# Patient Record
Sex: MALE | Race: BLACK OR AFRICAN AMERICAN | NOT HISPANIC OR LATINO | Employment: OTHER | URBAN - METROPOLITAN AREA
[De-identification: names, ages, dates, MRNs, and addresses within clinical notes are randomized per-mention and may not be internally consistent; named-entity substitution may affect disease eponyms.]

---

## 2017-01-06 ENCOUNTER — TELEPHONE (OUTPATIENT)
Dept: RADIOLOGY | Facility: HOSPITAL | Age: 62
End: 2017-01-06

## 2019-07-22 PROBLEM — E87.5 HYPERKALEMIA: Status: ACTIVE | Noted: 2019-07-22

## 2019-07-25 ENCOUNTER — PATIENT OUTREACH (OUTPATIENT)
Dept: ADMINISTRATIVE | Facility: CLINIC | Age: 64
End: 2019-07-25

## 2019-07-25 NOTE — PROGRESS NOTES
C3 nurse attempted to contact patient. No answer. The following message was left for the patient to return the call:  Good morning I am a nurse calling on behalf of [facility] from the [facility].  This is a Transitional Care Call for Kevin. When you have a moment please contact us at [phone] or [phone] Monday through Friday, between the hours of 8 am to 4 pm. We look forward to speaking with you. On behalf of [facility] have a nice day.    The patient does not have a scheduled HOSFU appointment within 7-14 days post hospital discharge date 07/24/19. Non [facility] P[facility].

## 2019-07-26 NOTE — PATIENT INSTRUCTIONS
"  Discharge Instructions for Hyperkalemia  You have been diagnosed with hyperkalemia (a high level of potassium in the blood). Potassium is important to the function of the nerve and muscle cells, including the cells of the heart. But a high level of potassium in the blood can cause  serious problems such as abnormal heart rhythms and even heart attack.  Diet changes  · Eat less of these potassium-rich foods:  ¨ Bananas (avoid bananas completely)  ¨ Apricots, fresh or dried  ¨ Oranges and orange juice  ¨ Grapefruit juice  ¨ Tomatoes, tomato sauce, and tomato juice  ¨ Spinach  ¨ Green, leafy vegetables, including salad greens, kale, broccoli, chard, and collards  ¨ Melons (all kinds)  ¨ Peas  ¨ Beans  ¨ Potatoes  ¨ Sweet potatoes  ¨ Avocados and guacamole  ¨ Vegetable juice (homemade or store-bought) and vegetable juice cocktail  ¨ Fruit juices  ¨ Nuts, including pistachios, almonds, peanuts, hazelnuts, [place], cashew, mixed  ¨ "Lite" or reduced sodium salt  Other home care  · Tell your healthcare provider about all prescription and over-the-counter medicines you are taking. Certain medicines can increase potassium levels.  · Take all medicines exactly as directed.  · Have your potassium levels checked regularly.  · Keep all follow-up appointments. Your healthcare provider needs to monitor your condition closely.  · Learn to take your own pulse. If your pulse is less than 60 beats per minute or irregular, call your provider.  Follow-up  Make a follow-up appointment as directed by our staff.     When to Call Your healthcare provider  Call your provider right away if you have any of the following:  · Chest pain (call 911)  · Fainting (call 911)  · Shortness of breath (call 911 if severe)  · Slow, irregular heartbeat  · Fatigue  · Dizziness  · Lightheadedness  · Confusion   Date Last Reviewed: 6/19/2015  © 2783-6836 [employer]. [address] [place] [address], [place] [address]. All rights reserved. This " information is not intended as a substitute for professional medical care. Always follow your healthcare professional's instructions.

## 2019-12-19 ENCOUNTER — TELEPHONE (OUTPATIENT)
Dept: TRANSPLANT | Facility: CLINIC | Age: 64
End: 2019-12-19

## 2020-01-20 PROBLEM — N18.5 CKD (CHRONIC KIDNEY DISEASE), STAGE V: Status: ACTIVE | Noted: 2020-01-20

## 2020-01-21 PROBLEM — D64.9 ANEMIA: Status: ACTIVE | Noted: 2020-01-21

## 2020-02-10 PROBLEM — N18.9 CHRONIC KIDNEY DISEASE (CKD): Status: ACTIVE | Noted: 2020-02-10

## 2020-03-02 PROBLEM — E55.9 VITAMIN D DEFICIENCY DISEASE: Status: ACTIVE | Noted: 2020-03-02

## 2020-03-02 PROBLEM — D63.1 ANEMIA OF CHRONIC RENAL FAILURE, STAGE 5: Status: ACTIVE | Noted: 2020-03-02

## 2020-03-02 PROBLEM — N18.5 ANEMIA OF CHRONIC RENAL FAILURE, STAGE 5: Status: ACTIVE | Noted: 2020-03-02

## 2020-06-12 ENCOUNTER — HISTORICAL (OUTPATIENT)
Dept: ADMINISTRATIVE | Facility: HOSPITAL | Age: 65
End: 2020-06-12

## 2020-06-12 LAB
ALBUMIN SERPL BCP-MCNC: 3.7 G/DL (ref 3.5–5)
ALBUMIN/GLOB SERPL ELPH: 1 {RATIO} (ref 1.5–2.2)
ALP SERPL-CCNC: 125 U/L (ref 50–136)
ALT SERPL W P-5'-P-CCNC: 21 U/L (ref 16–61)
ANION GAP SERPL CALC-SCNC: 4.2 MEQ/L (ref 10–20)
AST SERPL-CCNC: 23 U/L (ref 15–37)
BASOPHILS NFR BLD: 0 10 (ref 0–0.1)
BASOPHILS NFR BLD: 0.4 % (ref 0–1.5)
BILIRUB SERPL-MCNC: 0.51 MG/DL (ref 0.2–1)
BUN SERPL-MCNC: 15 MG/DL (ref 7–18)
CALCIUM SERPL-MCNC: 8.6 MG/DL (ref 8.5–10.1)
CHLORIDE SERPL-SCNC: 103 MMOL/L (ref 98–107)
CO2 SERPL-SCNC: 38 MMOL/L (ref 22–32)
CREAT SERPL-MCNC: 5.14 MG/DL (ref 0.7–1.3)
EGFR: 15 ML/MIN/1.73M
EOSINOPHIL NFR BLD: 0.1 10 (ref 0–0.7)
EOSINOPHIL NFR BLD: 1.4 % (ref 0–7)
ERYTHROCYTE [DISTWIDTH] IN BLOOD BY AUTOMATED COUNT: 13.8 % (ref 11.5–14.5)
GLOBULIN: 3.8 G/DL (ref 2.3–3.5)
GLUCOSE SERPL-MCNC: 113 MG/DL (ref 70–99)
GRAN #: 2.89 10 (ref 2–7.5)
GRAN%: 0.2 %
GRAN%: 51.9 % (ref 50–80)
HCT VFR BLD AUTO: 41.6 % (ref 43.5–53.7)
HGB BLD-MCNC: 13.8 G/DL (ref 14.1–18.1)
IMMATURE GRANULOCYTES #: 0.01 10
IPF: 8.1
LIPASE SERPL-CCNC: 319 U/L (ref 73–393)
LYMPH #: 1.9 10 (ref 1–3.5)
LYMPH%: 33.5 % (ref 12–50)
MCH RBC QN AUTO: 31.4 PG (ref 27–31)
MCHC RBC AUTO-ENTMCNC: 33.2 G% (ref 32–35)
MCV RBC AUTO: 94.5 FL (ref 80–97)
MONO #: 0.7 10 (ref 0–0.8)
MONO%: 12.6 % (ref 0–12)
OSMOC: 283 MOSM/KG (ref 275–295)
PMV BLD AUTO: 11.9 FL (ref 7.4–10.4)
PMV BLD AUTO: 87 10 (ref 142–424)
POTASSIUM SERPL-SCNC: 4.2 MMOL/L (ref 3.5–5.1)
PROT SERPL-MCNC: 7.5 G/DL (ref 6.4–8.2)
RBC # BLD AUTO: 4.4 M/UL (ref 4.69–6.13)
SODIUM BLD-SCNC: 141 MMOL/L (ref 136–145)
TROPONIN I SERPL DL<=0.01 NG/ML-MCNC: 0.19 NG/ML (ref 0–0.05)
WBC # BLD AUTO: 5.6 10 (ref 4–10.2)

## 2020-12-14 ENCOUNTER — HOSPITAL ENCOUNTER (EMERGENCY)
Facility: HOSPITAL | Age: 65
Discharge: HOME | End: 2020-12-14
Attending: EMERGENCY MEDICINE
Payer: MEDICARE

## 2020-12-14 VITALS
WEIGHT: 198 LBS | SYSTOLIC BLOOD PRESSURE: 138 MMHG | BODY MASS INDEX: 29.33 KG/M2 | OXYGEN SATURATION: 98 % | HEIGHT: 69 IN | TEMPERATURE: 98 F | RESPIRATION RATE: 18 BRPM | DIASTOLIC BLOOD PRESSURE: 63 MMHG | HEART RATE: 65 BPM

## 2020-12-14 DIAGNOSIS — R06.02 SHORTNESS OF BREATH: ICD-10-CM

## 2020-12-14 DIAGNOSIS — G44.89 OTHER HEADACHE SYNDROME: Primary | ICD-10-CM

## 2020-12-14 DIAGNOSIS — N18.6 ESRD (END STAGE RENAL DISEASE): ICD-10-CM

## 2020-12-14 LAB
ALBUMIN SERPL BCP-MCNC: 3.7 G/DL (ref 3.5–5.2)
ALP SERPL-CCNC: 106 U/L (ref 55–135)
ALT SERPL W/O P-5'-P-CCNC: 23 U/L (ref 10–44)
ANION GAP SERPL CALC-SCNC: 1 MMOL/L (ref 8–16)
AST SERPL-CCNC: 15 U/L (ref 10–40)
BASOPHILS # BLD AUTO: 0.02 K/UL (ref 0–0.2)
BASOPHILS NFR BLD: 0.3 % (ref 0–1.9)
BILIRUB SERPL-MCNC: 0.4 MG/DL (ref 0.1–1)
BUN SERPL-MCNC: 16 MG/DL (ref 8–23)
CALCIUM SERPL-MCNC: 8.8 MG/DL (ref 8.7–10.5)
CHLORIDE SERPL-SCNC: 101 MMOL/L (ref 95–110)
CO2 SERPL-SCNC: 35 MMOL/L (ref 23–29)
CREAT SERPL-MCNC: 5.8 MG/DL (ref 0.5–1.4)
CTP QC/QA: YES
DIFFERENTIAL METHOD: ABNORMAL
EOSINOPHIL # BLD AUTO: 0.2 K/UL (ref 0–0.5)
EOSINOPHIL NFR BLD: 2.6 % (ref 0–8)
ERYTHROCYTE [DISTWIDTH] IN BLOOD BY AUTOMATED COUNT: 13.6 % (ref 11.5–14.5)
EST. GFR  (AFRICAN AMERICAN): 11 ML/MIN/1.73 M^2
EST. GFR  (NON AFRICAN AMERICAN): 9.5 ML/MIN/1.73 M^2
GLUCOSE SERPL-MCNC: 120 MG/DL (ref 70–110)
HCT VFR BLD AUTO: 39.3 % (ref 40–54)
HGB BLD-MCNC: 12.7 G/DL (ref 14–18)
IMM GRANULOCYTES # BLD AUTO: 0.02 K/UL (ref 0–0.04)
IMM GRANULOCYTES NFR BLD AUTO: 0.3 % (ref 0–0.5)
LIPASE SERPL-CCNC: 240 U/L (ref 23–300)
LYMPHOCYTES # BLD AUTO: 1.6 K/UL (ref 1–4.8)
LYMPHOCYTES NFR BLD: 26.3 % (ref 18–48)
MCH RBC QN AUTO: 32.4 PG (ref 27–31)
MCHC RBC AUTO-ENTMCNC: 32.3 G/DL (ref 32–36)
MCV RBC AUTO: 100 FL (ref 82–98)
MONOCYTES # BLD AUTO: 0.7 K/UL (ref 0.3–1)
MONOCYTES NFR BLD: 12 % (ref 4–15)
NEUTROPHILS # BLD AUTO: 3.6 K/UL (ref 1.8–7.7)
NEUTROPHILS NFR BLD: 58.5 % (ref 38–73)
NRBC BLD-RTO: 0 /100 WBC
NT-PROBNP SERPL-MCNC: 306 PG/ML (ref 5–900)
PLATELET # BLD AUTO: 95 K/UL (ref 150–350)
PMV BLD AUTO: 11.1 FL (ref 9.2–12.9)
POTASSIUM SERPL-SCNC: 4.6 MMOL/L (ref 3.5–5.1)
PROT SERPL-MCNC: 7.5 G/DL (ref 6–8.4)
RBC # BLD AUTO: 3.92 M/UL (ref 4.6–6.2)
SARS-COV-2 RDRP RESP QL NAA+PROBE: NEGATIVE
SODIUM SERPL-SCNC: 137 MMOL/L (ref 136–145)
WBC # BLD AUTO: 6.08 K/UL (ref 3.9–12.7)

## 2020-12-14 PROCEDURE — 99284 EMERGENCY DEPT VISIT MOD MDM: CPT | Mod: 25

## 2020-12-14 PROCEDURE — 36415 COLL VENOUS BLD VENIPUNCTURE: CPT

## 2020-12-14 PROCEDURE — 83690 ASSAY OF LIPASE: CPT

## 2020-12-14 PROCEDURE — 25000003 PHARM REV CODE 250: Performed by: CLINICAL NURSE SPECIALIST

## 2020-12-14 PROCEDURE — 80053 COMPREHEN METABOLIC PANEL: CPT

## 2020-12-14 PROCEDURE — 83880 ASSAY OF NATRIURETIC PEPTIDE: CPT

## 2020-12-14 PROCEDURE — 85025 COMPLETE CBC W/AUTO DIFF WBC: CPT

## 2020-12-14 PROCEDURE — U0002 COVID-19 LAB TEST NON-CDC: HCPCS | Performed by: CLINICAL NURSE SPECIALIST

## 2020-12-14 RX ORDER — BUTALBITAL, ACETAMINOPHEN AND CAFFEINE 50; 325; 40 MG/1; MG/1; MG/1
1 TABLET ORAL
Status: COMPLETED | OUTPATIENT
Start: 2020-12-14 | End: 2020-12-14

## 2020-12-14 RX ADMIN — BUTALBITAL, ACETAMINOPHEN, AND CAFFEINE 1 TABLET: 50; 325; 40 TABLET ORAL at 05:12

## 2020-12-14 NOTE — ED PROVIDER NOTES
Encounter Date: 12/14/2020       History     Chief Complaint   Patient presents with    Migraine     It started at dialysis today.  Im just not feeling good.  I have a headache and neck pain.  I have also been having some diarrhea.      Kevin Sanon is an 65 y.o. male who complains of headache, fatigue, neck pain, weakness, shortness of breath. Symptoms began today and dialysis.  Patient did finish is total dialysis.  History of diabetes, anemia, COPD, renal failure.  Patient did not take any medication prior to arrival. Caregiver requesting blood work.          Review of patient's allergies indicates:  No Known Allergies  Past Medical History:   Diagnosis Date    Anemia     Back pain     COPD (chronic obstructive pulmonary disease)     Decreased platelet count     Diabetes mellitus     Hypertension     Kidney disease      Past Surgical History:   Procedure Laterality Date    BACK SURGERY      KNEE SURGERY      PLACEMENT OF ARTERIOVENOUS GRAFT Left 2/10/2020    Procedure: INSERTION, GRAFT, ARTERIOVENOUS;  Surgeon: Artie Joshi MD;  Location: [facility];  Service: Cardiovascular;  Laterality: Left;     Family History   Problem Relation Age of Onset    Diabetes Mother     No Known Problems Father     Kidney disease Sister     Diabetes Brother      Social History     Tobacco Use    Smoking status: Former Smoker     Packs/day: 1.00     Years: 20.00     Pack years: 20.00     Types: Cigars, Cigarettes    Smokeless tobacco: Never Used    Tobacco comment: Also reports 5-6 yrs of smoking cigars   Substance Use Topics    Alcohol use: No    Drug use: No     Review of Systems   Constitutional: Positive for activity change and fatigue. Negative for fever.   HENT: Negative for sore throat.    Respiratory: Positive for shortness of breath.    Cardiovascular: Negative for chest pain.   Gastrointestinal: Positive for diarrhea. Negative for nausea.   Genitourinary: Negative for dysuria.    Musculoskeletal: Negative for back pain.   Skin: Negative for rash.   Neurological: Positive for weakness and headaches.   Hematological: Does not bruise/bleed easily.   All other systems reviewed and are negative.      Physical Exam     Initial Vitals   BP Pulse Resp Temp SpO2   12/14/20 1733 12/14/20 1733 12/14/20 1732 12/14/20 1732 12/14/20 1733   126/67 89 18 97.6 °F (36.4 °C) 100 %      MAP       --                Physical Exam    Nursing note and vitals reviewed.  Constitutional: He appears well-developed and well-nourished.   HENT:   Head: Normocephalic and atraumatic.   Eyes: Pupils are equal, round, and reactive to light.   Neck: Normal range of motion.   Cardiovascular: Normal rate and regular rhythm.   Pulmonary/Chest: Breath sounds normal.   Abdominal: Soft. Bowel sounds are normal.   Musculoskeletal: Normal range of motion.   Neurological: He is alert and oriented to person, place, and time.   Skin: Skin is warm.   Psychiatric: He has a normal mood and affect.         ED Course   Procedures  Labs Reviewed   CBC W/ AUTO DIFFERENTIAL - Abnormal; Notable for the following components:       Result Value    RBC 3.92 (*)     Hemoglobin 12.7 (*)     Hematocrit 39.3 (*)      (*)     MCH 32.4 (*)     Platelets 95 (*)     All other components within normal limits   COMPREHENSIVE METABOLIC PANEL - Abnormal; Notable for the following components:    CO2 35 (*)     Glucose 120 (*)     Creatinine 5.8 (*)     Anion Gap 1 (*)     eGFR if  11.0 (*)     eGFR if non  9.5 (*)     All other components within normal limits   NT-PRO NATRIURETIC PEPTIDE   LIPASE   SARS-COV-2 RDRP GENE    Narrative:     This test utilizes isothermal nucleic acid amplification   technology to detect the SARS-CoV-2 RdRp nucleic acid segment.   The analytical sensitivity (limit of detection) is 125 genome   equivalents/mL.   A POSITIVE result implies infection with the SARS-CoV-2 virus;   the patient is  "presumed to be contagious.     A NEGATIVE result means that SARS-CoV-2 nucleic acids are not   present above the limit of detection. A NEGATIVE result should be   treated as presumptive. It does not rule out the possibility of   COVID-19 and should not be the sole basis for treatment decisions.   If COVID-19 is strongly suspected based on clinical and exposure   history, re-testing using an alternate molecular assay should be   considered.   This test is only for use under the Food and Drug   Administration s Emergency Use Authorization (EUA).   Commercial kits are provided by [employer]   Performance characteristics of the EUA have been independently   verified by [facility] Department of   Pathology and Laboratory Medicine.   _________________________________________________________________   The authorized Fact Sheet for Healthcare Providers and the authorized Fact   Sheet for Patients of the ID NOW COVID-19 are available on the FDA   website:     https://www.fda.gov/media/936503/download  https://www.fda.gov/media/809142/download              Imaging Results          X-Ray Chest AP Portable (In process)                  Medical Decision Making:   Differential Diagnosis:   COVID, CHF, COPD, anemia  Clinical Tests:   Lab Tests: Ordered and Reviewed  Radiological Study: Ordered and Reviewed                   ED Course as of Dec 14 1907   Mon Dec 14, 2020   1814 SARS-CoV-2 RNA, Amplification, Qual: Negative [AB]   1814 RBC(!): 3.92 [AB]   1814 Hemoglobin(!): 12.7 [AB]   1814 Hematocrit(!): 39.3 [AB]   1900 CO2(!): 35 [AB]   1901 Glucose(!): 120 [AB]   1901 Creatinine(!): 5.8 [AB]   1901 NT-proBNP: 306 [AB]   1901 Lipase Result: 240 [AB]   1901 No acute disease     X-Ray Chest AP Portable [AB]   1902 Patient was given lab and x-ray results and states understanding. Patient states "I feel better now."    [AB]      ED Course User Index  [AB] Merle Bassett NP            Clinical Impression:       " ICD-10-CM ICD-9-CM   1. Other headache syndrome  G44.89 339.89   2. Shortness of breath  R06.02 786.05   3. ESRD (end stage renal disease)  N18.6 585.6                          ED Disposition Condition    Discharge Stable        ED Prescriptions     None        Follow-up Information     Follow up With Specialties Details Why Contact Info    Emmy Gaines NP Family Medicine  As needed, If symptoms worsen [address] [place] [address]  [phone]                                         Merle Bassett NP  12/14/20 3408

## 2021-05-19 ENCOUNTER — HOSPITAL ENCOUNTER (EMERGENCY)
Facility: HOSPITAL | Age: 66
Discharge: HOME | End: 2021-05-19
Attending: EMERGENCY MEDICINE
Payer: MEDICARE

## 2021-05-19 VITALS
WEIGHT: 200 LBS | SYSTOLIC BLOOD PRESSURE: 152 MMHG | RESPIRATION RATE: 18 BRPM | TEMPERATURE: 98 F | DIASTOLIC BLOOD PRESSURE: 69 MMHG | HEIGHT: 69 IN | OXYGEN SATURATION: 98 % | BODY MASS INDEX: 29.62 KG/M2 | HEART RATE: 87 BPM

## 2021-05-19 DIAGNOSIS — J06.9 VIRAL URI WITH COUGH: Primary | ICD-10-CM

## 2021-05-19 DIAGNOSIS — R05.9 COUGH: ICD-10-CM

## 2021-05-19 DIAGNOSIS — J44.9 CHRONIC OBSTRUCTIVE PULMONARY DISEASE, UNSPECIFIED COPD TYPE: ICD-10-CM

## 2021-05-19 PROCEDURE — 94640 AIRWAY INHALATION TREATMENT: CPT

## 2021-05-19 PROCEDURE — 63700000 PHARM REV CODE 250 ALT 637 W/O HCPCS: Performed by: EMERGENCY MEDICINE

## 2021-05-19 PROCEDURE — 99900035 HC TECH TIME PER 15 MIN (STAT)

## 2021-05-19 PROCEDURE — 99284 EMERGENCY DEPT VISIT MOD MDM: CPT | Mod: 25

## 2021-05-19 PROCEDURE — 63600175 PHARM REV CODE 636 W HCPCS: Performed by: EMERGENCY MEDICINE

## 2021-05-19 PROCEDURE — 25000242 PHARM REV CODE 250 ALT 637 W/ HCPCS: Performed by: EMERGENCY MEDICINE

## 2021-05-19 PROCEDURE — 25000003 PHARM REV CODE 250: Performed by: EMERGENCY MEDICINE

## 2021-05-19 PROCEDURE — 96372 THER/PROPH/DIAG INJ SC/IM: CPT

## 2021-05-19 RX ORDER — DEXAMETHASONE SODIUM PHOSPHATE 4 MG/ML
8 INJECTION, SOLUTION INTRA-ARTICULAR; INTRALESIONAL; INTRAMUSCULAR; INTRAVENOUS; SOFT TISSUE
Status: COMPLETED | OUTPATIENT
Start: 2021-05-19 | End: 2021-05-19

## 2021-05-19 RX ORDER — PROMETHAZINE HYDROCHLORIDE AND DEXTROMETHORPHAN HYDROBROMIDE 6.25; 15 MG/5ML; MG/5ML
5 SYRUP ORAL 3 TIMES DAILY
Qty: 1 BOTTLE | Refills: 0 | Status: SHIPPED | OUTPATIENT
Start: 2021-05-19 | End: 2021-05-24

## 2021-05-19 RX ORDER — HYDROCODONE BITARTRATE AND ACETAMINOPHEN 7.5; 325 MG/15ML; MG/15ML
15 SOLUTION ORAL
Status: COMPLETED | OUTPATIENT
Start: 2021-05-19 | End: 2021-05-19

## 2021-05-19 RX ORDER — BENZONATATE 100 MG/1
100 CAPSULE ORAL 3 TIMES DAILY PRN
Qty: 20 CAPSULE | Refills: 0 | Status: SHIPPED | OUTPATIENT
Start: 2021-05-19 | End: 2021-05-29

## 2021-05-19 RX ORDER — AZITHROMYCIN 250 MG/1
500 TABLET, FILM COATED ORAL
Status: COMPLETED | OUTPATIENT
Start: 2021-05-19 | End: 2021-05-19

## 2021-05-19 RX ORDER — IPRATROPIUM BROMIDE AND ALBUTEROL SULFATE 2.5; .5 MG/3ML; MG/3ML
3 SOLUTION RESPIRATORY (INHALATION)
Status: COMPLETED | OUTPATIENT
Start: 2021-05-19 | End: 2021-05-19

## 2021-05-19 RX ORDER — ALBUTEROL SULFATE 90 UG/1
2 AEROSOL, METERED RESPIRATORY (INHALATION) EVERY 4 HOURS PRN
Qty: 18 G | Refills: 0 | Status: SHIPPED | OUTPATIENT
Start: 2021-05-19 | End: 2021-11-02

## 2021-05-19 RX ORDER — HYDROCODONE BITARTRATE AND ACETAMINOPHEN 7.5; 325 MG/15ML; MG/15ML
15 SOLUTION ORAL EVERY 4 HOURS PRN
Status: DISCONTINUED | OUTPATIENT
Start: 2021-05-19 | End: 2021-05-19

## 2021-05-19 RX ORDER — AZITHROMYCIN 500 MG/1
500 TABLET, FILM COATED ORAL DAILY
Qty: 7 TABLET | Refills: 0 | Status: SHIPPED | OUTPATIENT
Start: 2021-05-19 | End: 2021-05-26

## 2021-05-19 RX ADMIN — DEXAMETHASONE SODIUM PHOSPHATE 8 MG: 4 INJECTION, SOLUTION INTRA-ARTICULAR; INTRALESIONAL; INTRAMUSCULAR; INTRAVENOUS; SOFT TISSUE at 08:05

## 2021-05-19 RX ADMIN — AZITHROMYCIN MONOHYDRATE 500 MG: 250 TABLET ORAL at 08:05

## 2021-05-19 RX ADMIN — HYDROCODONE BITARTRATE AND ACETAMINOPHEN 15 ML: 7.5; 325 SOLUTION ORAL at 08:05

## 2021-05-19 RX ADMIN — IPRATROPIUM BROMIDE AND ALBUTEROL SULFATE 3 ML: .5; 3 SOLUTION RESPIRATORY (INHALATION) at 08:05

## 2021-06-28 ENCOUNTER — TELEPHONE (OUTPATIENT)
Dept: VASCULAR SURGERY | Facility: CLINIC | Age: 66
End: 2021-06-28

## 2021-08-16 ENCOUNTER — HOSPITAL ENCOUNTER (EMERGENCY)
Facility: HOSPITAL | Age: 66
Discharge: HOME | End: 2021-08-16
Attending: EMERGENCY MEDICINE
Payer: MEDICARE

## 2021-08-16 VITALS
HEART RATE: 88 BPM | OXYGEN SATURATION: 100 % | TEMPERATURE: 98 F | BODY MASS INDEX: 29.3 KG/M2 | SYSTOLIC BLOOD PRESSURE: 154 MMHG | WEIGHT: 198.44 LBS | RESPIRATION RATE: 16 BRPM | DIASTOLIC BLOOD PRESSURE: 85 MMHG

## 2021-08-16 DIAGNOSIS — Z78.9 PROBLEM WITH VASCULAR ACCESS: Primary | ICD-10-CM

## 2021-08-16 PROCEDURE — 99282 EMERGENCY DEPT VISIT SF MDM: CPT

## 2021-08-26 ENCOUNTER — HOSPITAL ENCOUNTER (OUTPATIENT)
Dept: VASCULAR SURGERY | Facility: CLINIC | Age: 66
Discharge: HOME | End: 2021-08-26
Attending: SURGERY
Payer: MEDICARE

## 2021-08-26 ENCOUNTER — OFFICE VISIT (OUTPATIENT)
Dept: VASCULAR SURGERY | Facility: CLINIC | Age: 66
End: 2021-08-26
Attending: SURGERY
Payer: MEDICARE

## 2021-08-26 VITALS
TEMPERATURE: 98 F | HEIGHT: 69 IN | WEIGHT: 199.5 LBS | BODY MASS INDEX: 29.55 KG/M2 | SYSTOLIC BLOOD PRESSURE: 166 MMHG | HEART RATE: 67 BPM | DIASTOLIC BLOOD PRESSURE: 70 MMHG

## 2021-08-26 DIAGNOSIS — N18.6 ESRD (END STAGE RENAL DISEASE) ON DIALYSIS: Primary | ICD-10-CM

## 2021-08-26 DIAGNOSIS — Z99.2 ESRD (END STAGE RENAL DISEASE) ON DIALYSIS: Primary | ICD-10-CM

## 2021-08-26 DIAGNOSIS — N18.5 CKD (CHRONIC KIDNEY DISEASE), STAGE V: Primary | ICD-10-CM

## 2021-08-26 DIAGNOSIS — T82.858D AV GRAFT STENOSIS, SUBSEQUENT ENCOUNTER: ICD-10-CM

## 2021-08-26 DIAGNOSIS — N18.5 CKD (CHRONIC KIDNEY DISEASE), STAGE V: ICD-10-CM

## 2021-08-26 PROCEDURE — 99999 PR PBB SHADOW E&M-EST. PATIENT-LVL III: CPT | Mod: PBBFAC,,, | Performed by: SURGERY

## 2021-08-26 PROCEDURE — 99214 PR OFFICE/OUTPT VISIT, EST, LEVL IV, 30-39 MIN: ICD-10-PCS | Mod: S$PBB,,, | Performed by: SURGERY

## 2021-08-26 PROCEDURE — 99213 OFFICE O/P EST LOW 20 MIN: CPT | Mod: PBBFAC | Performed by: SURGERY

## 2021-08-26 PROCEDURE — 93990 PR DUPLEX HEMODIALYSIS ACCESS: ICD-10-PCS | Mod: 26,S$PBB,, | Performed by: SURGERY

## 2021-08-26 PROCEDURE — 93990 DOPPLER FLOW TESTING: CPT | Mod: PBBFAC | Performed by: SURGERY

## 2021-08-26 PROCEDURE — 99214 OFFICE O/P EST MOD 30 MIN: CPT | Mod: S$PBB,,, | Performed by: SURGERY

## 2021-08-26 PROCEDURE — 99999 PR PBB SHADOW E&M-EST. PATIENT-LVL III: ICD-10-PCS | Mod: PBBFAC,,, | Performed by: SURGERY

## 2021-08-26 PROCEDURE — 93990 DOPPLER FLOW TESTING: CPT | Mod: 26,S$PBB,, | Performed by: SURGERY

## 2021-08-26 RX ORDER — LISINOPRIL 20 MG/1
20 TABLET ORAL DAILY
COMMUNITY
Start: 2021-07-28 | End: 2023-02-15 | Stop reason: SDUPTHER

## 2021-10-28 DIAGNOSIS — R63.4 WEIGHT LOSS: ICD-10-CM

## 2021-10-28 DIAGNOSIS — R13.10 DYSPHAGIA, UNSPECIFIED TYPE: Primary | ICD-10-CM

## 2021-10-28 RX ORDER — SODIUM CHLORIDE 0.9 % (FLUSH) 0.9 %
10 SYRINGE (ML) INJECTION
Status: CANCELLED | OUTPATIENT
Start: 2021-10-28

## 2021-10-28 RX ORDER — LIDOCAINE HYDROCHLORIDE 10 MG/ML
1 INJECTION, SOLUTION EPIDURAL; INFILTRATION; INTRACAUDAL; PERINEURAL ONCE
Status: CANCELLED | OUTPATIENT
Start: 2021-10-28 | End: 2021-10-28

## 2021-10-28 RX ORDER — SODIUM CHLORIDE 9 MG/ML
INJECTION, SOLUTION INTRAVENOUS CONTINUOUS
Status: CANCELLED | OUTPATIENT
Start: 2021-11-04

## 2021-11-01 DIAGNOSIS — R63.4 LOSS OF WEIGHT: ICD-10-CM

## 2021-11-01 DIAGNOSIS — R13.10 DYSPHAGIA: Primary | ICD-10-CM

## 2021-11-02 ENCOUNTER — HOSPITAL ENCOUNTER (OUTPATIENT)
Dept: PULMONOLOGY | Facility: HOSPITAL | Age: 66
Discharge: HOME | End: 2021-11-02
Attending: SURGERY
Payer: MEDICARE

## 2021-11-02 ENCOUNTER — HOSPITAL ENCOUNTER (OUTPATIENT)
Dept: RADIOLOGY | Facility: HOSPITAL | Age: 66
Discharge: HOME | End: 2021-11-02
Attending: SURGERY
Payer: MEDICARE

## 2021-11-02 ENCOUNTER — HOSPITAL ENCOUNTER (OUTPATIENT)
Dept: PREADMISSION TESTING | Facility: HOSPITAL | Age: 66
Discharge: HOME | End: 2021-11-02
Attending: SURGERY
Payer: MEDICARE

## 2021-11-02 ENCOUNTER — ANESTHESIA EVENT (OUTPATIENT)
Dept: ENDOSCOPY | Facility: HOSPITAL | Age: 66
End: 2021-11-02
Payer: MEDICARE

## 2021-11-02 VITALS — HEIGHT: 69 IN | BODY MASS INDEX: 28.14 KG/M2 | WEIGHT: 190 LBS

## 2021-11-02 DIAGNOSIS — R13.10 DYSPHAGIA: ICD-10-CM

## 2021-11-02 DIAGNOSIS — R63.4 WEIGHT LOSS: ICD-10-CM

## 2021-11-02 DIAGNOSIS — R13.10 DYSPHAGIA, UNSPECIFIED TYPE: ICD-10-CM

## 2021-11-02 DIAGNOSIS — R63.4 LOSS OF WEIGHT: ICD-10-CM

## 2021-11-02 PROCEDURE — A9698 NON-RAD CONTRAST MATERIALNOC: HCPCS | Performed by: SURGERY

## 2021-11-02 PROCEDURE — 93010 EKG 12-LEAD: ICD-10-PCS | Mod: ,,, | Performed by: INTERNAL MEDICINE

## 2021-11-02 PROCEDURE — 93005 ELECTROCARDIOGRAM TRACING: CPT

## 2021-11-02 PROCEDURE — 25500020 PHARM REV CODE 255: Performed by: SURGERY

## 2021-11-02 PROCEDURE — 74220 X-RAY XM ESOPHAGUS 1CNTRST: CPT | Mod: TC

## 2021-11-02 PROCEDURE — 93010 ELECTROCARDIOGRAM REPORT: CPT | Mod: ,,, | Performed by: INTERNAL MEDICINE

## 2021-11-02 RX ADMIN — BARIUM SULFATE 176 G: 960 POWDER, FOR SUSPENSION ORAL at 09:11

## 2021-11-02 RX ADMIN — BARIUM SULFATE 135 ML: 980 POWDER, FOR SUSPENSION ORAL at 09:11

## 2021-11-03 PROBLEM — R63.4 WEIGHT LOSS: Chronic | Status: ACTIVE | Noted: 2021-10-01

## 2021-11-03 PROBLEM — R13.10 DYSPHAGIA: Status: ACTIVE | Noted: 2021-10-01

## 2021-11-04 ENCOUNTER — HOSPITAL ENCOUNTER (OUTPATIENT)
Facility: HOSPITAL | Age: 66
Discharge: HOME | End: 2021-11-04
Attending: SURGERY | Admitting: SURGERY
Payer: MEDICARE

## 2021-11-04 ENCOUNTER — ANESTHESIA (OUTPATIENT)
Dept: ENDOSCOPY | Facility: HOSPITAL | Age: 66
End: 2021-11-04
Payer: MEDICARE

## 2021-11-04 VITALS
SYSTOLIC BLOOD PRESSURE: 150 MMHG | RESPIRATION RATE: 18 BRPM | HEART RATE: 85 BPM | DIASTOLIC BLOOD PRESSURE: 67 MMHG | OXYGEN SATURATION: 98 % | TEMPERATURE: 97 F

## 2021-11-04 DIAGNOSIS — R63.4 WEIGHT LOSS: Chronic | ICD-10-CM

## 2021-11-04 DIAGNOSIS — K31.89 DUODENAL MASS: Chronic | ICD-10-CM

## 2021-11-04 DIAGNOSIS — R13.10 DYSPHAGIA, UNSPECIFIED TYPE: Primary | ICD-10-CM

## 2021-11-04 DIAGNOSIS — R19.00 ABDOMINAL MASS, UNSPECIFIED ABDOMINAL LOCATION: ICD-10-CM

## 2021-11-04 DIAGNOSIS — N18.5 CKD (CHRONIC KIDNEY DISEASE), STAGE V: ICD-10-CM

## 2021-11-04 PROBLEM — K21.9 HIATAL HERNIA WITH GERD WITHOUT ESOPHAGITIS: Chronic | Status: ACTIVE | Noted: 2021-11-04

## 2021-11-04 PROBLEM — K44.9 HIATAL HERNIA WITH GERD WITHOUT ESOPHAGITIS: Chronic | Status: ACTIVE | Noted: 2021-11-04

## 2021-11-04 LAB
ESTIMATED AVG GLUCOSE: 80 MG/DL (ref 68–131)
HBA1C MFR BLD: 4.4 % (ref 4–5.6)
POCT GLUCOSE: 87 MG/DL (ref 70–110)

## 2021-11-04 PROCEDURE — 87081 CULTURE SCREEN ONLY: CPT | Performed by: SURGERY

## 2021-11-04 PROCEDURE — 37000009 HC ANESTHESIA EA ADD 15 MINS: Performed by: SURGERY

## 2021-11-04 PROCEDURE — 83036 HEMOGLOBIN GLYCOSYLATED A1C: CPT | Performed by: SURGERY

## 2021-11-04 PROCEDURE — 25000003 PHARM REV CODE 250: Performed by: SURGERY

## 2021-11-04 PROCEDURE — 37000008 HC ANESTHESIA 1ST 15 MINUTES: Performed by: SURGERY

## 2021-11-04 PROCEDURE — 36415 COLL VENOUS BLD VENIPUNCTURE: CPT | Performed by: SURGERY

## 2021-11-04 PROCEDURE — 43239 EGD BIOPSY SINGLE/MULTIPLE: CPT | Performed by: SURGERY

## 2021-11-04 PROCEDURE — 25500020 PHARM REV CODE 255: Performed by: SURGERY

## 2021-11-04 PROCEDURE — 63600175 PHARM REV CODE 636 W HCPCS: Performed by: NURSE ANESTHETIST, CERTIFIED REGISTERED

## 2021-11-04 PROCEDURE — 27201423 OPTIME MED/SURG SUP & DEVICES STERILE SUPPLY: Performed by: SURGERY

## 2021-11-04 RX ORDER — PANTOPRAZOLE SODIUM 40 MG/1
40 TABLET, DELAYED RELEASE ORAL DAILY
Qty: 30 TABLET | Refills: 11 | Status: SHIPPED | OUTPATIENT
Start: 2021-11-04 | End: 2023-02-15

## 2021-11-04 RX ORDER — PROPOFOL 10 MG/ML
INJECTION, EMULSION INTRAVENOUS
Status: DISCONTINUED | OUTPATIENT
Start: 2021-11-04 | End: 2021-11-04

## 2021-11-04 RX ORDER — LIDOCAINE HYDROCHLORIDE 10 MG/ML
1 INJECTION, SOLUTION EPIDURAL; INFILTRATION; INTRACAUDAL; PERINEURAL ONCE
Status: DISCONTINUED | OUTPATIENT
Start: 2021-11-04 | End: 2021-11-04 | Stop reason: HOSPADM

## 2021-11-04 RX ORDER — SODIUM CHLORIDE 9 MG/ML
INJECTION, SOLUTION INTRAVENOUS CONTINUOUS
Status: DISCONTINUED | OUTPATIENT
Start: 2021-11-04 | End: 2021-11-04 | Stop reason: HOSPADM

## 2021-11-04 RX ORDER — SODIUM CHLORIDE 0.9 % (FLUSH) 0.9 %
10 SYRINGE (ML) INJECTION
Status: DISCONTINUED | OUTPATIENT
Start: 2021-11-04 | End: 2021-11-04 | Stop reason: HOSPADM

## 2021-11-04 RX ADMIN — SODIUM CHLORIDE: 0.9 INJECTION, SOLUTION INTRAVENOUS at 06:11

## 2021-11-04 RX ADMIN — IOHEXOL 100 ML: 350 INJECTION, SOLUTION INTRAVENOUS at 09:11

## 2021-11-04 RX ADMIN — PROPOFOL 50 MG: 10 INJECTION, EMULSION INTRAVENOUS at 07:11

## 2021-11-04 RX ADMIN — TOPICAL ANESTHETIC 2 EACH: 200 SPRAY DENTAL; PERIODONTAL at 07:11

## 2021-11-05 ENCOUNTER — HOSPITAL ENCOUNTER (EMERGENCY)
Facility: HOSPITAL | Age: 66
Discharge: ANOTHER HOSPITAL | End: 2021-11-05
Attending: EMERGENCY MEDICINE
Payer: MEDICARE

## 2021-11-05 VITALS
WEIGHT: 200 LBS | HEIGHT: 69 IN | TEMPERATURE: 98 F | RESPIRATION RATE: 18 BRPM | DIASTOLIC BLOOD PRESSURE: 70 MMHG | SYSTOLIC BLOOD PRESSURE: 153 MMHG | BODY MASS INDEX: 29.62 KG/M2 | HEART RATE: 8 BPM | OXYGEN SATURATION: 100 %

## 2021-11-05 DIAGNOSIS — R55 SYNCOPE: ICD-10-CM

## 2021-11-05 DIAGNOSIS — S22.088A OTHER CLOSED FRACTURE OF TWELFTH THORACIC VERTEBRA, INITIAL ENCOUNTER: Primary | ICD-10-CM

## 2021-11-05 DIAGNOSIS — T14.90XA TRAUMA: ICD-10-CM

## 2021-11-05 DIAGNOSIS — R79.89 ELEVATED TROPONIN: ICD-10-CM

## 2021-11-05 DIAGNOSIS — S01.03XA PUNCTURE WOUND OF SCALP, INITIAL ENCOUNTER: ICD-10-CM

## 2021-11-05 DIAGNOSIS — S50.01XA CONTUSION OF RIGHT ELBOW, INITIAL ENCOUNTER: ICD-10-CM

## 2021-11-05 LAB
ALBUMIN SERPL BCP-MCNC: 2.9 G/DL (ref 3.5–5.2)
ALP SERPL-CCNC: 69 U/L (ref 55–135)
ALT SERPL W/O P-5'-P-CCNC: 24 U/L (ref 10–44)
ANION GAP SERPL CALC-SCNC: 9 MMOL/L (ref 8–16)
APTT BLDCRRT: 23.2 SEC (ref 21–32)
AST SERPL-CCNC: 34 U/L (ref 10–40)
BASOPHILS # BLD AUTO: 0.02 K/UL (ref 0–0.2)
BASOPHILS NFR BLD: 0.5 % (ref 0–1.9)
BILIRUB SERPL-MCNC: 1 MG/DL (ref 0.1–1)
BUN SERPL-MCNC: 7 MG/DL (ref 8–23)
CALCIUM SERPL-MCNC: 8 MG/DL (ref 8.7–10.5)
CHLORIDE SERPL-SCNC: 102 MMOL/L (ref 95–110)
CO2 SERPL-SCNC: 29 MMOL/L (ref 23–29)
CREAT SERPL-MCNC: 3.9 MG/DL (ref 0.5–1.4)
CTP QC/QA: YES
DIFFERENTIAL METHOD: ABNORMAL
EOSINOPHIL # BLD AUTO: 0 K/UL (ref 0–0.5)
EOSINOPHIL NFR BLD: 0.3 % (ref 0–8)
ERYTHROCYTE [DISTWIDTH] IN BLOOD BY AUTOMATED COUNT: 15.1 % (ref 11.5–14.5)
EST. GFR  (AFRICAN AMERICAN): 17.7 ML/MIN/1.73 M^2
EST. GFR  (NON AFRICAN AMERICAN): 15.3 ML/MIN/1.73 M^2
GLUCOSE SERPL-MCNC: 113 MG/DL (ref 70–110)
HCT VFR BLD AUTO: 34.2 % (ref 40–54)
HELICOBACTER, RAPID UREA: NEGATIVE
HGB BLD-MCNC: 11.7 G/DL (ref 14–18)
IMM GRANULOCYTES # BLD AUTO: 0.06 K/UL (ref 0–0.04)
IMM GRANULOCYTES NFR BLD AUTO: 1.6 % (ref 0–0.5)
INR PPP: 1 (ref 0.8–1.2)
LACTATE SERPL-SCNC: 2.6 MMOL/L (ref 0.5–2.2)
LYMPHOCYTES # BLD AUTO: 0.9 K/UL (ref 1–4.8)
LYMPHOCYTES NFR BLD: 24.2 % (ref 18–48)
MAGNESIUM SERPL-MCNC: 1.7 MG/DL (ref 1.6–2.6)
MCH RBC QN AUTO: 34.2 PG (ref 27–31)
MCHC RBC AUTO-ENTMCNC: 34.2 G/DL (ref 32–36)
MCV RBC AUTO: 100 FL (ref 82–98)
MONOCYTES # BLD AUTO: 0.2 K/UL (ref 0.3–1)
MONOCYTES NFR BLD: 4.7 % (ref 4–15)
NEUTROPHILS # BLD AUTO: 2.5 K/UL (ref 1.8–7.7)
NEUTROPHILS NFR BLD: 68.7 % (ref 38–73)
NRBC BLD-RTO: 0 /100 WBC
PLATELET # BLD AUTO: 57 K/UL (ref 150–450)
PMV BLD AUTO: 12 FL (ref 9.2–12.9)
POTASSIUM SERPL-SCNC: 3.2 MMOL/L (ref 3.5–5.1)
PROT SERPL-MCNC: 6 G/DL (ref 6–8.4)
PROTHROMBIN TIME: 10.8 SEC (ref 9–12.5)
RBC # BLD AUTO: 3.42 M/UL (ref 4.6–6.2)
SARS-COV-2 RDRP RESP QL NAA+PROBE: NEGATIVE
SODIUM SERPL-SCNC: 140 MMOL/L (ref 136–145)
TROPONIN I SERPL DL<=0.01 NG/ML-MCNC: 0.65 NG/ML (ref 0–0.03)
WBC # BLD AUTO: 3.64 K/UL (ref 3.9–12.7)

## 2021-11-05 PROCEDURE — 93010 ELECTROCARDIOGRAM REPORT: CPT | Mod: ,,, | Performed by: INTERNAL MEDICINE

## 2021-11-05 PROCEDURE — 96374 THER/PROPH/DIAG INJ IV PUSH: CPT

## 2021-11-05 PROCEDURE — 25000003 PHARM REV CODE 250: Performed by: EMERGENCY MEDICINE

## 2021-11-05 PROCEDURE — 99291 PR CRITICAL CARE, E/M 30-74 MINUTES: ICD-10-PCS | Mod: GC,,, | Performed by: EMERGENCY MEDICINE

## 2021-11-05 PROCEDURE — 96376 TX/PRO/DX INJ SAME DRUG ADON: CPT

## 2021-11-05 PROCEDURE — 36415 COLL VENOUS BLD VENIPUNCTURE: CPT | Performed by: EMERGENCY MEDICINE

## 2021-11-05 PROCEDURE — 90715 TDAP VACCINE 7 YRS/> IM: CPT | Performed by: EMERGENCY MEDICINE

## 2021-11-05 PROCEDURE — 63600175 PHARM REV CODE 636 W HCPCS: Performed by: EMERGENCY MEDICINE

## 2021-11-05 PROCEDURE — 85025 COMPLETE CBC W/AUTO DIFF WBC: CPT | Performed by: EMERGENCY MEDICINE

## 2021-11-05 PROCEDURE — 99285 EMERGENCY DEPT VISIT HI MDM: CPT | Mod: 25

## 2021-11-05 PROCEDURE — 93010 EKG 12-LEAD: ICD-10-PCS | Mod: ,,, | Performed by: INTERNAL MEDICINE

## 2021-11-05 PROCEDURE — 93005 ELECTROCARDIOGRAM TRACING: CPT

## 2021-11-05 PROCEDURE — 99285 EMERGENCY DEPT VISIT HI MDM: CPT | Mod: 25,27

## 2021-11-05 PROCEDURE — 96375 TX/PRO/DX INJ NEW DRUG ADDON: CPT

## 2021-11-05 PROCEDURE — 83735 ASSAY OF MAGNESIUM: CPT | Performed by: EMERGENCY MEDICINE

## 2021-11-05 PROCEDURE — 85730 THROMBOPLASTIN TIME PARTIAL: CPT | Performed by: EMERGENCY MEDICINE

## 2021-11-05 PROCEDURE — 90471 IMMUNIZATION ADMIN: CPT | Performed by: EMERGENCY MEDICINE

## 2021-11-05 PROCEDURE — 80053 COMPREHEN METABOLIC PANEL: CPT | Performed by: EMERGENCY MEDICINE

## 2021-11-05 PROCEDURE — 84484 ASSAY OF TROPONIN QUANT: CPT | Performed by: EMERGENCY MEDICINE

## 2021-11-05 PROCEDURE — U0002 COVID-19 LAB TEST NON-CDC: HCPCS | Performed by: EMERGENCY MEDICINE

## 2021-11-05 PROCEDURE — 83605 ASSAY OF LACTIC ACID: CPT | Performed by: EMERGENCY MEDICINE

## 2021-11-05 PROCEDURE — 99291 CRITICAL CARE FIRST HOUR: CPT | Mod: GC,,, | Performed by: EMERGENCY MEDICINE

## 2021-11-05 PROCEDURE — 85610 PROTHROMBIN TIME: CPT | Performed by: EMERGENCY MEDICINE

## 2021-11-05 RX ORDER — ONDANSETRON 2 MG/ML
8 INJECTION INTRAMUSCULAR; INTRAVENOUS
Status: COMPLETED | OUTPATIENT
Start: 2021-11-05 | End: 2021-11-05

## 2021-11-05 RX ORDER — HYDROMORPHONE HYDROCHLORIDE 1 MG/ML
1 INJECTION, SOLUTION INTRAMUSCULAR; INTRAVENOUS; SUBCUTANEOUS
Status: COMPLETED | OUTPATIENT
Start: 2021-11-05 | End: 2021-11-05

## 2021-11-05 RX ORDER — ONDANSETRON 2 MG/ML
4 INJECTION INTRAMUSCULAR; INTRAVENOUS
Status: COMPLETED | OUTPATIENT
Start: 2021-11-05 | End: 2021-11-05

## 2021-11-05 RX ORDER — ASPIRIN 325 MG
325 TABLET ORAL
Status: COMPLETED | OUTPATIENT
Start: 2021-11-05 | End: 2021-11-05

## 2021-11-05 RX ORDER — HYDRALAZINE HYDROCHLORIDE 20 MG/ML
10 INJECTION INTRAMUSCULAR; INTRAVENOUS
Status: COMPLETED | OUTPATIENT
Start: 2021-11-05 | End: 2021-11-05

## 2021-11-05 RX ORDER — MORPHINE SULFATE 4 MG/ML
4 INJECTION, SOLUTION INTRAMUSCULAR; INTRAVENOUS
Status: COMPLETED | OUTPATIENT
Start: 2021-11-05 | End: 2021-11-05

## 2021-11-05 RX ORDER — FENTANYL CITRATE 50 UG/ML
100 INJECTION, SOLUTION INTRAMUSCULAR; INTRAVENOUS
Status: COMPLETED | OUTPATIENT
Start: 2021-11-05 | End: 2021-11-05

## 2021-11-05 RX ORDER — ONDANSETRON 2 MG/ML
8 INJECTION INTRAMUSCULAR; INTRAVENOUS
Status: DISCONTINUED | OUTPATIENT
Start: 2021-11-05 | End: 2021-11-05

## 2021-11-05 RX ADMIN — MORPHINE SULFATE 4 MG: 4 INJECTION INTRAVENOUS at 04:11

## 2021-11-05 RX ADMIN — ASPIRIN 325 MG ORAL TABLET 325 MG: 325 PILL ORAL at 06:11

## 2021-11-05 RX ADMIN — FENTANYL CITRATE 100 MCG: 50 INJECTION INTRAMUSCULAR; INTRAVENOUS at 08:11

## 2021-11-05 RX ADMIN — HYDROMORPHONE HYDROCHLORIDE 1 MG: 1 INJECTION, SOLUTION INTRAMUSCULAR; INTRAVENOUS; SUBCUTANEOUS at 06:11

## 2021-11-05 RX ADMIN — TETANUS TOXOID, REDUCED DIPHTHERIA TOXOID AND ACELLULAR PERTUSSIS VACCINE, ADSORBED 0.5 ML: 5; 2.5; 8; 8; 2.5 SUSPENSION INTRAMUSCULAR at 07:11

## 2021-11-05 RX ADMIN — ONDANSETRON HYDROCHLORIDE 4 MG: 2 SOLUTION INTRAMUSCULAR; INTRAVENOUS at 04:11

## 2021-11-05 RX ADMIN — HYDRALAZINE HYDROCHLORIDE 10 MG: 20 INJECTION INTRAMUSCULAR; INTRAVENOUS at 06:11

## 2021-11-05 RX ADMIN — ONDANSETRON HYDROCHLORIDE 8 MG: 2 SOLUTION INTRAMUSCULAR; INTRAVENOUS at 09:11

## 2021-11-06 ENCOUNTER — ANESTHESIA EVENT (OUTPATIENT)
Dept: SURGERY | Facility: HOSPITAL | Age: 66
DRG: 459 | End: 2021-11-06
Payer: MEDICARE

## 2021-11-06 ENCOUNTER — HOSPITAL ENCOUNTER (INPATIENT)
Facility: HOSPITAL | Age: 66
LOS: 4 days | Discharge: NURSING, REHAB OR LONG-TERM CARE | DRG: 459 | End: 2021-11-12
Attending: EMERGENCY MEDICINE | Admitting: ORTHOPAEDIC SURGERY
Payer: MEDICARE

## 2021-11-06 DIAGNOSIS — M89.9 CHRONIC KIDNEY DISEASE-MINERAL AND BONE DISORDER: ICD-10-CM

## 2021-11-06 DIAGNOSIS — S22.081A BURST FRACTURE OF T12 VERTEBRA: ICD-10-CM

## 2021-11-06 DIAGNOSIS — S22.089A: ICD-10-CM

## 2021-11-06 DIAGNOSIS — E83.9 CHRONIC KIDNEY DISEASE-MINERAL AND BONE DISORDER: ICD-10-CM

## 2021-11-06 DIAGNOSIS — S22.088A OTHER CLOSED FRACTURE OF TWELFTH THORACIC VERTEBRA, INITIAL ENCOUNTER: ICD-10-CM

## 2021-11-06 DIAGNOSIS — N18.9 CHRONIC KIDNEY DISEASE-MINERAL AND BONE DISORDER: ICD-10-CM

## 2021-11-06 DIAGNOSIS — R55 SYNCOPE: ICD-10-CM

## 2021-11-06 DIAGNOSIS — D69.6 THROMBOCYTOPENIA: Primary | Chronic | ICD-10-CM

## 2021-11-06 PROBLEM — Z01.818 PREOPERATIVE EXAMINATION: Status: ACTIVE | Noted: 2021-11-06

## 2021-11-06 PROBLEM — N18.6 ESRD ON HEMODIALYSIS: Status: ACTIVE | Noted: 2021-11-06

## 2021-11-06 PROBLEM — R79.89 ELEVATED TROPONIN: Status: ACTIVE | Noted: 2021-11-06

## 2021-11-06 PROBLEM — Z99.2 ESRD ON HEMODIALYSIS: Status: ACTIVE | Noted: 2021-11-06

## 2021-11-06 LAB
ABO + RH BLD: NORMAL
ALBUMIN SERPL BCP-MCNC: 3 G/DL (ref 3.5–5.2)
ALP SERPL-CCNC: 57 U/L (ref 55–135)
ALT SERPL W/O P-5'-P-CCNC: 19 U/L (ref 10–44)
ANION GAP SERPL CALC-SCNC: 12 MMOL/L (ref 8–16)
APTT BLDCRRT: 24.1 SEC (ref 21–32)
AST SERPL-CCNC: 39 U/L (ref 10–40)
BASOPHILS # BLD AUTO: 0.01 K/UL (ref 0–0.2)
BASOPHILS NFR BLD: 0.2 % (ref 0–1.9)
BILIRUB SERPL-MCNC: 1 MG/DL (ref 0.1–1)
BLD GP AB SCN CELLS X3 SERPL QL: NORMAL
BUN SERPL-MCNC: 10 MG/DL (ref 8–23)
CALCIUM SERPL-MCNC: 8.3 MG/DL (ref 8.7–10.5)
CHLORIDE SERPL-SCNC: 100 MMOL/L (ref 95–110)
CO2 SERPL-SCNC: 25 MMOL/L (ref 23–29)
CREAT SERPL-MCNC: 4.7 MG/DL (ref 0.5–1.4)
DIFFERENTIAL METHOD: ABNORMAL
EOSINOPHIL # BLD AUTO: 0 K/UL (ref 0–0.5)
EOSINOPHIL NFR BLD: 0 % (ref 0–8)
ERYTHROCYTE [DISTWIDTH] IN BLOOD BY AUTOMATED COUNT: 16.4 % (ref 11.5–14.5)
EST. GFR  (AFRICAN AMERICAN): 14.1 ML/MIN/1.73 M^2
EST. GFR  (NON AFRICAN AMERICAN): 12.2 ML/MIN/1.73 M^2
GLUCOSE SERPL-MCNC: 106 MG/DL (ref 70–110)
HCT VFR BLD AUTO: 31.4 % (ref 40–54)
HGB BLD-MCNC: 10.4 G/DL (ref 14–18)
IMM GRANULOCYTES # BLD AUTO: 0.03 K/UL (ref 0–0.04)
IMM GRANULOCYTES NFR BLD AUTO: 0.7 % (ref 0–0.5)
INR PPP: 1 (ref 0.8–1.2)
LYMPHOCYTES # BLD AUTO: 0.4 K/UL (ref 1–4.8)
LYMPHOCYTES NFR BLD: 8.9 % (ref 18–48)
MCH RBC QN AUTO: 34.3 PG (ref 27–31)
MCHC RBC AUTO-ENTMCNC: 33.1 G/DL (ref 32–36)
MCV RBC AUTO: 104 FL (ref 82–98)
MONOCYTES # BLD AUTO: 0.3 K/UL (ref 0.3–1)
MONOCYTES NFR BLD: 6.4 % (ref 4–15)
NEUTROPHILS # BLD AUTO: 3.7 K/UL (ref 1.8–7.7)
NEUTROPHILS NFR BLD: 83.8 % (ref 38–73)
NRBC BLD-RTO: 0 /100 WBC
PHOSPHATE SERPL-MCNC: 3.9 MG/DL (ref 2.7–4.5)
PLATELET # BLD AUTO: 59 K/UL (ref 150–450)
PMV BLD AUTO: 12.7 FL (ref 9.2–12.9)
POCT GLUCOSE: 108 MG/DL (ref 70–110)
POCT GLUCOSE: 138 MG/DL (ref 70–110)
POCT GLUCOSE: 94 MG/DL (ref 70–110)
POCT GLUCOSE: 98 MG/DL (ref 70–110)
POTASSIUM SERPL-SCNC: 3.6 MMOL/L (ref 3.5–5.1)
PROT SERPL-MCNC: 5.5 G/DL (ref 6–8.4)
PROTHROMBIN TIME: 11.1 SEC (ref 9–12.5)
RBC # BLD AUTO: 3.03 M/UL (ref 4.6–6.2)
SODIUM SERPL-SCNC: 137 MMOL/L (ref 136–145)
TROPONIN I SERPL DL<=0.01 NG/ML-MCNC: 0.25 NG/ML (ref 0–0.03)
WBC # BLD AUTO: 4.4 K/UL (ref 3.9–12.7)

## 2021-11-06 PROCEDURE — 93010 ELECTROCARDIOGRAM REPORT: CPT | Mod: ,,, | Performed by: INTERNAL MEDICINE

## 2021-11-06 PROCEDURE — 63600175 PHARM REV CODE 636 W HCPCS: Performed by: STUDENT IN AN ORGANIZED HEALTH CARE EDUCATION/TRAINING PROGRAM

## 2021-11-06 PROCEDURE — 99222 PR INITIAL HOSPITAL CARE,LEVL II: ICD-10-PCS | Mod: ,,, | Performed by: INTERNAL MEDICINE

## 2021-11-06 PROCEDURE — 36415 COLL VENOUS BLD VENIPUNCTURE: CPT | Performed by: ORTHOPAEDIC SURGERY

## 2021-11-06 PROCEDURE — 84100 ASSAY OF PHOSPHORUS: CPT

## 2021-11-06 PROCEDURE — 99214 OFFICE O/P EST MOD 30 MIN: CPT | Mod: ,,, | Performed by: NURSE PRACTITIONER

## 2021-11-06 PROCEDURE — 25000003 PHARM REV CODE 250: Performed by: HOSPITALIST

## 2021-11-06 PROCEDURE — G0378 HOSPITAL OBSERVATION PER HR: HCPCS

## 2021-11-06 PROCEDURE — 93005 ELECTROCARDIOGRAM TRACING: CPT

## 2021-11-06 PROCEDURE — 93010 EKG 12-LEAD: ICD-10-PCS | Mod: ,,, | Performed by: INTERNAL MEDICINE

## 2021-11-06 PROCEDURE — 86900 BLOOD TYPING SEROLOGIC ABO: CPT | Performed by: STUDENT IN AN ORGANIZED HEALTH CARE EDUCATION/TRAINING PROGRAM

## 2021-11-06 PROCEDURE — 86920 COMPATIBILITY TEST SPIN: CPT | Performed by: STUDENT IN AN ORGANIZED HEALTH CARE EDUCATION/TRAINING PROGRAM

## 2021-11-06 PROCEDURE — 25000003 PHARM REV CODE 250: Performed by: STUDENT IN AN ORGANIZED HEALTH CARE EDUCATION/TRAINING PROGRAM

## 2021-11-06 PROCEDURE — 84484 ASSAY OF TROPONIN QUANT: CPT

## 2021-11-06 PROCEDURE — 99223 1ST HOSP IP/OBS HIGH 75: CPT | Mod: 57,AI,, | Performed by: ORTHOPAEDIC SURGERY

## 2021-11-06 PROCEDURE — 80053 COMPREHEN METABOLIC PANEL: CPT | Performed by: EMERGENCY MEDICINE

## 2021-11-06 PROCEDURE — 85610 PROTHROMBIN TIME: CPT | Performed by: STUDENT IN AN ORGANIZED HEALTH CARE EDUCATION/TRAINING PROGRAM

## 2021-11-06 PROCEDURE — 99222 1ST HOSP IP/OBS MODERATE 55: CPT | Mod: ,,, | Performed by: INTERNAL MEDICINE

## 2021-11-06 PROCEDURE — 99214 PR OFFICE/OUTPT VISIT, EST, LEVL IV, 30-39 MIN: ICD-10-PCS | Mod: ,,, | Performed by: NURSE PRACTITIONER

## 2021-11-06 PROCEDURE — 99220 PR INITIAL OBSERVATION CARE,LEVL III: ICD-10-PCS | Mod: GC,,, | Performed by: HOSPITALIST

## 2021-11-06 PROCEDURE — 85730 THROMBOPLASTIN TIME PARTIAL: CPT | Performed by: STUDENT IN AN ORGANIZED HEALTH CARE EDUCATION/TRAINING PROGRAM

## 2021-11-06 PROCEDURE — 99223 PR INITIAL HOSPITAL CARE,LEVL III: ICD-10-PCS | Mod: 57,AI,, | Performed by: ORTHOPAEDIC SURGERY

## 2021-11-06 PROCEDURE — 85025 COMPLETE CBC W/AUTO DIFF WBC: CPT | Performed by: STUDENT IN AN ORGANIZED HEALTH CARE EDUCATION/TRAINING PROGRAM

## 2021-11-06 PROCEDURE — 99220 PR INITIAL OBSERVATION CARE,LEVL III: CPT | Mod: GC,,, | Performed by: HOSPITALIST

## 2021-11-06 RX ORDER — INSULIN ASPART 100 [IU]/ML
1-10 INJECTION, SOLUTION INTRAVENOUS; SUBCUTANEOUS
Status: DISCONTINUED | OUTPATIENT
Start: 2021-11-06 | End: 2021-11-06

## 2021-11-06 RX ORDER — ONDANSETRON 2 MG/ML
4 INJECTION INTRAMUSCULAR; INTRAVENOUS EVERY 12 HOURS PRN
Status: DISCONTINUED | OUTPATIENT
Start: 2021-11-06 | End: 2021-11-12 | Stop reason: HOSPADM

## 2021-11-06 RX ORDER — GLUCAGON 1 MG
1 KIT INJECTION
Status: DISCONTINUED | OUTPATIENT
Start: 2021-11-06 | End: 2021-11-12 | Stop reason: HOSPADM

## 2021-11-06 RX ORDER — MORPHINE SULFATE 4 MG/ML
4 INJECTION, SOLUTION INTRAMUSCULAR; INTRAVENOUS
Status: COMPLETED | OUTPATIENT
Start: 2021-11-06 | End: 2021-11-06

## 2021-11-06 RX ORDER — METHOCARBAMOL 750 MG/1
750 TABLET, FILM COATED ORAL
Status: COMPLETED | OUTPATIENT
Start: 2021-11-06 | End: 2021-11-06

## 2021-11-06 RX ORDER — ONDANSETRON 2 MG/ML
4 INJECTION INTRAMUSCULAR; INTRAVENOUS
Status: COMPLETED | OUTPATIENT
Start: 2021-11-06 | End: 2021-11-06

## 2021-11-06 RX ORDER — ATORVASTATIN CALCIUM 20 MG/1
40 TABLET, FILM COATED ORAL DAILY
Status: DISCONTINUED | OUTPATIENT
Start: 2021-11-06 | End: 2021-11-12 | Stop reason: HOSPADM

## 2021-11-06 RX ORDER — LIDOCAINE HYDROCHLORIDE 10 MG/ML
1 INJECTION, SOLUTION EPIDURAL; INFILTRATION; INTRACAUDAL; PERINEURAL ONCE
Status: DISCONTINUED | OUTPATIENT
Start: 2021-11-06 | End: 2021-11-12 | Stop reason: HOSPADM

## 2021-11-06 RX ORDER — AMLODIPINE BESYLATE 10 MG/1
10 TABLET ORAL DAILY
Status: DISCONTINUED | OUTPATIENT
Start: 2021-11-06 | End: 2021-11-12 | Stop reason: HOSPADM

## 2021-11-06 RX ORDER — ACETAMINOPHEN 325 MG/1
650 TABLET ORAL EVERY 4 HOURS PRN
Status: DISCONTINUED | OUTPATIENT
Start: 2021-11-06 | End: 2021-11-08

## 2021-11-06 RX ORDER — IBUPROFEN 200 MG
16 TABLET ORAL
Status: DISCONTINUED | OUTPATIENT
Start: 2021-11-06 | End: 2021-11-12 | Stop reason: HOSPADM

## 2021-11-06 RX ORDER — SODIUM CHLORIDE 9 MG/ML
INJECTION, SOLUTION INTRAVENOUS ONCE
Status: DISCONTINUED | OUTPATIENT
Start: 2021-11-08 | End: 2021-11-06

## 2021-11-06 RX ORDER — LISINOPRIL 20 MG/1
20 TABLET ORAL DAILY
Status: DISCONTINUED | OUTPATIENT
Start: 2021-11-06 | End: 2021-11-12 | Stop reason: HOSPADM

## 2021-11-06 RX ORDER — OXYCODONE HYDROCHLORIDE 10 MG/1
10 TABLET ORAL EVERY 4 HOURS PRN
Status: DISCONTINUED | OUTPATIENT
Start: 2021-11-06 | End: 2021-11-12 | Stop reason: HOSPADM

## 2021-11-06 RX ORDER — OXYCODONE HYDROCHLORIDE 5 MG/1
5 TABLET ORAL EVERY 4 HOURS PRN
Status: DISCONTINUED | OUTPATIENT
Start: 2021-11-06 | End: 2021-11-12 | Stop reason: HOSPADM

## 2021-11-06 RX ORDER — IBUPROFEN 200 MG
24 TABLET ORAL
Status: DISCONTINUED | OUTPATIENT
Start: 2021-11-06 | End: 2021-11-12 | Stop reason: HOSPADM

## 2021-11-06 RX ORDER — LISINOPRIL 20 MG/1
20 TABLET ORAL ONCE
Status: COMPLETED | OUTPATIENT
Start: 2021-11-06 | End: 2021-11-06

## 2021-11-06 RX ORDER — ACETAMINOPHEN 325 MG/1
650 TABLET ORAL EVERY 8 HOURS PRN
Status: DISCONTINUED | OUTPATIENT
Start: 2021-11-06 | End: 2021-11-08

## 2021-11-06 RX ORDER — SODIUM CHLORIDE 0.9 % (FLUSH) 0.9 %
10 SYRINGE (ML) INJECTION
Status: DISCONTINUED | OUTPATIENT
Start: 2021-11-06 | End: 2021-11-12 | Stop reason: HOSPADM

## 2021-11-06 RX ADMIN — OXYCODONE HYDROCHLORIDE 10 MG: 10 TABLET ORAL at 05:11

## 2021-11-06 RX ADMIN — MORPHINE SULFATE 4 MG: 4 INJECTION INTRAVENOUS at 01:11

## 2021-11-06 RX ADMIN — LISINOPRIL 20 MG: 20 TABLET ORAL at 10:11

## 2021-11-06 RX ADMIN — OXYCODONE HYDROCHLORIDE 10 MG: 10 TABLET ORAL at 09:11

## 2021-11-06 RX ADMIN — ONDANSETRON 4 MG: 2 INJECTION INTRAMUSCULAR; INTRAVENOUS at 05:11

## 2021-11-06 RX ADMIN — AMLODIPINE BESYLATE 10 MG: 10 TABLET ORAL at 10:11

## 2021-11-06 RX ADMIN — ATORVASTATIN CALCIUM 40 MG: 20 TABLET, FILM COATED ORAL at 10:11

## 2021-11-06 RX ADMIN — METHOCARBAMOL 750 MG: 750 TABLET ORAL at 01:11

## 2021-11-06 RX ADMIN — OXYCODONE HYDROCHLORIDE 10 MG: 10 TABLET ORAL at 08:11

## 2021-11-06 RX ADMIN — ONDANSETRON 4 MG: 2 INJECTION INTRAMUSCULAR; INTRAVENOUS at 01:11

## 2021-11-06 RX ADMIN — LISINOPRIL 20 MG: 20 TABLET ORAL at 06:11

## 2021-11-07 ENCOUNTER — ANESTHESIA (OUTPATIENT)
Dept: SURGERY | Facility: HOSPITAL | Age: 66
DRG: 459 | End: 2021-11-07
Payer: MEDICARE

## 2021-11-07 PROBLEM — D69.6 THROMBOCYTOPENIA: Chronic | Status: ACTIVE | Noted: 2021-11-07

## 2021-11-07 LAB
ALBUMIN SERPL BCP-MCNC: 2.6 G/DL (ref 3.5–5.2)
ALBUMIN SERPL BCP-MCNC: 2.9 G/DL (ref 3.5–5.2)
ALP SERPL-CCNC: 48 U/L (ref 55–135)
ALP SERPL-CCNC: 60 U/L (ref 55–135)
ALT SERPL W/O P-5'-P-CCNC: 18 U/L (ref 10–44)
ALT SERPL W/O P-5'-P-CCNC: 19 U/L (ref 10–44)
ANION GAP SERPL CALC-SCNC: 13 MMOL/L (ref 8–16)
ANION GAP SERPL CALC-SCNC: 15 MMOL/L (ref 8–16)
ANISOCYTOSIS BLD QL SMEAR: SLIGHT
AST SERPL-CCNC: 27 U/L (ref 10–40)
AST SERPL-CCNC: 29 U/L (ref 10–40)
BASO STIPL BLD QL SMEAR: ABNORMAL
BASOPHILS # BLD AUTO: 0 K/UL (ref 0–0.2)
BASOPHILS # BLD AUTO: 0.01 K/UL (ref 0–0.2)
BASOPHILS NFR BLD: 0 % (ref 0–1.9)
BASOPHILS NFR BLD: 0.3 % (ref 0–1.9)
BILIRUB SERPL-MCNC: 0.8 MG/DL (ref 0.1–1)
BILIRUB SERPL-MCNC: 1 MG/DL (ref 0.1–1)
BLD PROD TYP BPU: NORMAL
BLOOD UNIT EXPIRATION DATE: NORMAL
BLOOD UNIT TYPE CODE: 6200
BLOOD UNIT TYPE: NORMAL
BUN SERPL-MCNC: 15 MG/DL (ref 8–23)
BUN SERPL-MCNC: 18 MG/DL (ref 8–23)
CALCIUM SERPL-MCNC: 8.1 MG/DL (ref 8.7–10.5)
CALCIUM SERPL-MCNC: 8.4 MG/DL (ref 8.7–10.5)
CHLORIDE SERPL-SCNC: 101 MMOL/L (ref 95–110)
CHLORIDE SERPL-SCNC: 99 MMOL/L (ref 95–110)
CO2 SERPL-SCNC: 22 MMOL/L (ref 23–29)
CO2 SERPL-SCNC: 28 MMOL/L (ref 23–29)
CODING SYSTEM: NORMAL
CREAT SERPL-MCNC: 5 MG/DL (ref 0.5–1.4)
CREAT SERPL-MCNC: 6.5 MG/DL (ref 0.5–1.4)
DIFFERENTIAL METHOD: ABNORMAL
DIFFERENTIAL METHOD: ABNORMAL
DISPENSE STATUS: NORMAL
EOSINOPHIL # BLD AUTO: 0 K/UL (ref 0–0.5)
EOSINOPHIL # BLD AUTO: 0 K/UL (ref 0–0.5)
EOSINOPHIL NFR BLD: 0.3 % (ref 0–8)
EOSINOPHIL NFR BLD: 0.5 % (ref 0–8)
ERYTHROCYTE [DISTWIDTH] IN BLOOD BY AUTOMATED COUNT: 16.1 % (ref 11.5–14.5)
ERYTHROCYTE [DISTWIDTH] IN BLOOD BY AUTOMATED COUNT: 16.2 % (ref 11.5–14.5)
EST. GFR  (AFRICAN AMERICAN): 13.1 ML/MIN/1.73 M^2
EST. GFR  (AFRICAN AMERICAN): 9.5 ML/MIN/1.73 M^2
EST. GFR  (NON AFRICAN AMERICAN): 11.3 ML/MIN/1.73 M^2
EST. GFR  (NON AFRICAN AMERICAN): 8.2 ML/MIN/1.73 M^2
GLUCOSE SERPL-MCNC: 121 MG/DL (ref 70–110)
GLUCOSE SERPL-MCNC: 87 MG/DL (ref 70–110)
HCT VFR BLD AUTO: 26.8 % (ref 40–54)
HCT VFR BLD AUTO: 29.6 % (ref 40–54)
HGB BLD-MCNC: 8.5 G/DL (ref 14–18)
HGB BLD-MCNC: 9.6 G/DL (ref 14–18)
HYPOCHROMIA BLD QL SMEAR: ABNORMAL
IMM GRANULOCYTES # BLD AUTO: 0.02 K/UL (ref 0–0.04)
IMM GRANULOCYTES # BLD AUTO: 0.09 K/UL (ref 0–0.04)
IMM GRANULOCYTES NFR BLD AUTO: 0.5 % (ref 0–0.5)
IMM GRANULOCYTES NFR BLD AUTO: 2.4 % (ref 0–0.5)
LYMPHOCYTES # BLD AUTO: 0.4 K/UL (ref 1–4.8)
LYMPHOCYTES # BLD AUTO: 0.5 K/UL (ref 1–4.8)
LYMPHOCYTES NFR BLD: 10.5 % (ref 18–48)
LYMPHOCYTES NFR BLD: 12.7 % (ref 18–48)
MCH RBC QN AUTO: 34.1 PG (ref 27–31)
MCH RBC QN AUTO: 34.8 PG (ref 27–31)
MCHC RBC AUTO-ENTMCNC: 31.7 G/DL (ref 32–36)
MCHC RBC AUTO-ENTMCNC: 32.4 G/DL (ref 32–36)
MCV RBC AUTO: 107 FL (ref 82–98)
MCV RBC AUTO: 108 FL (ref 82–98)
MONOCYTES # BLD AUTO: 0.2 K/UL (ref 0.3–1)
MONOCYTES # BLD AUTO: 0.3 K/UL (ref 0.3–1)
MONOCYTES NFR BLD: 5.9 % (ref 4–15)
MONOCYTES NFR BLD: 6.1 % (ref 4–15)
NEUTROPHILS # BLD AUTO: 3 K/UL (ref 1.8–7.7)
NEUTROPHILS # BLD AUTO: 3.3 K/UL (ref 1.8–7.7)
NEUTROPHILS NFR BLD: 80.2 % (ref 38–73)
NEUTROPHILS NFR BLD: 80.6 % (ref 38–73)
NRBC BLD-RTO: 0 /100 WBC
NRBC BLD-RTO: 0 /100 WBC
PLATELET # BLD AUTO: 57 K/UL (ref 150–450)
PLATELET # BLD AUTO: 68 K/UL (ref 150–450)
PLATELET BLD QL SMEAR: ABNORMAL
PMV BLD AUTO: 10.7 FL (ref 9.2–12.9)
PMV BLD AUTO: 12.2 FL (ref 9.2–12.9)
POCT GLUCOSE: 124 MG/DL (ref 70–110)
POCT GLUCOSE: 95 MG/DL (ref 70–110)
POIKILOCYTOSIS BLD QL SMEAR: SLIGHT
POLYCHROMASIA BLD QL SMEAR: ABNORMAL
POTASSIUM SERPL-SCNC: 3.8 MMOL/L (ref 3.5–5.1)
POTASSIUM SERPL-SCNC: 4.2 MMOL/L (ref 3.5–5.1)
PROT SERPL-MCNC: 4.9 G/DL (ref 6–8.4)
PROT SERPL-MCNC: 5.4 G/DL (ref 6–8.4)
RBC # BLD AUTO: 2.49 M/UL (ref 4.6–6.2)
RBC # BLD AUTO: 2.76 M/UL (ref 4.6–6.2)
SCHISTOCYTES BLD QL SMEAR: ABNORMAL
SODIUM SERPL-SCNC: 138 MMOL/L (ref 136–145)
SODIUM SERPL-SCNC: 140 MMOL/L (ref 136–145)
TOXIC GRANULES BLD QL SMEAR: PRESENT
UNIT NUMBER: NORMAL
WBC # BLD AUTO: 3.72 K/UL (ref 3.9–12.7)
WBC # BLD AUTO: 4.1 K/UL (ref 3.9–12.7)

## 2021-11-07 PROCEDURE — 22842 INSERT SPINE FIXATION DEVICE: CPT | Mod: GC,,, | Performed by: ORTHOPAEDIC SURGERY

## 2021-11-07 PROCEDURE — G0378 HOSPITAL OBSERVATION PER HR: HCPCS

## 2021-11-07 PROCEDURE — 63600175 PHARM REV CODE 636 W HCPCS: Performed by: ORTHOPAEDIC SURGERY

## 2021-11-07 PROCEDURE — 25000003 PHARM REV CODE 250: Performed by: NURSE ANESTHETIST, CERTIFIED REGISTERED

## 2021-11-07 PROCEDURE — 63266 EXCISE INTRSPINL LESION THRC: CPT | Mod: GC,,, | Performed by: ORTHOPAEDIC SURGERY

## 2021-11-07 PROCEDURE — 36000711: Performed by: ORTHOPAEDIC SURGERY

## 2021-11-07 PROCEDURE — 36620 INSERTION CATHETER ARTERY: CPT | Mod: 59,,, | Performed by: ANESTHESIOLOGY

## 2021-11-07 PROCEDURE — 63266 PR EXCIS INTRASP LESN,XDURAL,THORACIC: ICD-10-PCS | Mod: GC,,, | Performed by: ORTHOPAEDIC SURGERY

## 2021-11-07 PROCEDURE — 71000039 HC RECOVERY, EACH ADD'L HOUR: Performed by: ORTHOPAEDIC SURGERY

## 2021-11-07 PROCEDURE — 20936 SP BONE AGRFT LOCAL ADD-ON: CPT | Mod: GC,,, | Performed by: ORTHOPAEDIC SURGERY

## 2021-11-07 PROCEDURE — 25000003 PHARM REV CODE 250: Performed by: STUDENT IN AN ORGANIZED HEALTH CARE EDUCATION/TRAINING PROGRAM

## 2021-11-07 PROCEDURE — 85025 COMPLETE CBC W/AUTO DIFF WBC: CPT | Mod: 91 | Performed by: STUDENT IN AN ORGANIZED HEALTH CARE EDUCATION/TRAINING PROGRAM

## 2021-11-07 PROCEDURE — D9220A PRA ANESTHESIA: ICD-10-PCS | Mod: CRNA,,, | Performed by: NURSE ANESTHETIST, CERTIFIED REGISTERED

## 2021-11-07 PROCEDURE — 80053 COMPREHEN METABOLIC PANEL: CPT | Mod: 91 | Performed by: STUDENT IN AN ORGANIZED HEALTH CARE EDUCATION/TRAINING PROGRAM

## 2021-11-07 PROCEDURE — D9220A PRA ANESTHESIA: Mod: ANES,,, | Performed by: ANESTHESIOLOGY

## 2021-11-07 PROCEDURE — 27201423 OPTIME MED/SURG SUP & DEVICES STERILE SUPPLY: Performed by: ORTHOPAEDIC SURGERY

## 2021-11-07 PROCEDURE — 20930 PR ALLOGRAFT FOR SPINE SURGERY ONLY MORSELIZED: ICD-10-PCS | Mod: GC,,, | Performed by: ORTHOPAEDIC SURGERY

## 2021-11-07 PROCEDURE — 36415 COLL VENOUS BLD VENIPUNCTURE: CPT | Performed by: STUDENT IN AN ORGANIZED HEALTH CARE EDUCATION/TRAINING PROGRAM

## 2021-11-07 PROCEDURE — 36000710: Performed by: ORTHOPAEDIC SURGERY

## 2021-11-07 PROCEDURE — 63600175 PHARM REV CODE 636 W HCPCS: Performed by: STUDENT IN AN ORGANIZED HEALTH CARE EDUCATION/TRAINING PROGRAM

## 2021-11-07 PROCEDURE — 22614 ARTHRD PST TQ 1NTRSPC EA ADD: CPT | Mod: GC,,, | Performed by: ORTHOPAEDIC SURGERY

## 2021-11-07 PROCEDURE — 71000016 HC POSTOP RECOV ADDL HR: Performed by: ORTHOPAEDIC SURGERY

## 2021-11-07 PROCEDURE — 63600175 PHARM REV CODE 636 W HCPCS: Performed by: NURSE ANESTHETIST, CERTIFIED REGISTERED

## 2021-11-07 PROCEDURE — 22610 PR ARTHRODESIS, POST/POSTLAT, SNGL INTERSPACE, THORACIC: ICD-10-PCS | Mod: 51,GC,, | Performed by: ORTHOPAEDIC SURGERY

## 2021-11-07 PROCEDURE — 80053 COMPREHEN METABOLIC PANEL: CPT | Performed by: STUDENT IN AN ORGANIZED HEALTH CARE EDUCATION/TRAINING PROGRAM

## 2021-11-07 PROCEDURE — 20936 PR AUTOGRAFT SPINE SURGERY LOCAL FROM SAME INCISION: ICD-10-PCS | Mod: GC,,, | Performed by: ORTHOPAEDIC SURGERY

## 2021-11-07 PROCEDURE — D9220A PRA ANESTHESIA: ICD-10-PCS | Mod: ANES,,, | Performed by: ANESTHESIOLOGY

## 2021-11-07 PROCEDURE — 22610 ARTHRD PST TQ 1NTRSPC THRC: CPT | Mod: 51,GC,, | Performed by: ORTHOPAEDIC SURGERY

## 2021-11-07 PROCEDURE — 20930 SP BONE ALGRFT MORSEL ADD-ON: CPT | Mod: GC,,, | Performed by: ORTHOPAEDIC SURGERY

## 2021-11-07 PROCEDURE — C1729 CATH, DRAINAGE: HCPCS | Performed by: ORTHOPAEDIC SURGERY

## 2021-11-07 PROCEDURE — 22842 PR POSTERIOR SEGMENTAL INSTRUMENTATION 3-6 VRT SEG: ICD-10-PCS | Mod: GC,,, | Performed by: ORTHOPAEDIC SURGERY

## 2021-11-07 PROCEDURE — 85025 COMPLETE CBC W/AUTO DIFF WBC: CPT | Performed by: STUDENT IN AN ORGANIZED HEALTH CARE EDUCATION/TRAINING PROGRAM

## 2021-11-07 PROCEDURE — 25000003 PHARM REV CODE 250: Performed by: ORTHOPAEDIC SURGERY

## 2021-11-07 PROCEDURE — 82962 GLUCOSE BLOOD TEST: CPT | Performed by: ORTHOPAEDIC SURGERY

## 2021-11-07 PROCEDURE — 37000008 HC ANESTHESIA 1ST 15 MINUTES: Performed by: ORTHOPAEDIC SURGERY

## 2021-11-07 PROCEDURE — 80100014 HC HEMODIALYSIS 1:1

## 2021-11-07 PROCEDURE — 36620 PR INSERT CATH,ART,PERCUT,SHORTTERM: ICD-10-PCS | Mod: 59,,, | Performed by: ANESTHESIOLOGY

## 2021-11-07 PROCEDURE — 22614 PR ARTHRODESIS, POST/POSTLAT, SNGL INTERSPACE, EA ADDTL: ICD-10-PCS | Mod: GC,,, | Performed by: ORTHOPAEDIC SURGERY

## 2021-11-07 PROCEDURE — 37000009 HC ANESTHESIA EA ADD 15 MINS: Performed by: ORTHOPAEDIC SURGERY

## 2021-11-07 PROCEDURE — 25000003 PHARM REV CODE 250: Performed by: INTERNAL MEDICINE

## 2021-11-07 PROCEDURE — P9035 PLATELET PHERES LEUKOREDUCED: HCPCS | Performed by: ANESTHESIOLOGY

## 2021-11-07 PROCEDURE — 27800903 OPTIME MED/SURG SUP & DEVICES OTHER IMPLANTS: Performed by: ORTHOPAEDIC SURGERY

## 2021-11-07 PROCEDURE — D9220A PRA ANESTHESIA: Mod: CRNA,,, | Performed by: NURSE ANESTHETIST, CERTIFIED REGISTERED

## 2021-11-07 PROCEDURE — 71000015 HC POSTOP RECOV 1ST HR: Performed by: ORTHOPAEDIC SURGERY

## 2021-11-07 PROCEDURE — 63600175 PHARM REV CODE 636 W HCPCS: Performed by: ANESTHESIOLOGY

## 2021-11-07 PROCEDURE — C1713 ANCHOR/SCREW BN/BN,TIS/BN: HCPCS | Performed by: ORTHOPAEDIC SURGERY

## 2021-11-07 PROCEDURE — 27100025 HC TUBING, SET FLUID WARMER: Performed by: ANESTHESIOLOGY

## 2021-11-07 PROCEDURE — 25000003 PHARM REV CODE 250: Performed by: ANESTHESIOLOGY

## 2021-11-07 PROCEDURE — 71000033 HC RECOVERY, INTIAL HOUR: Performed by: ORTHOPAEDIC SURGERY

## 2021-11-07 DEVICE — SCREW BONE SPINAL 5.5 5 X 45MM: Type: IMPLANTABLE DEVICE | Site: SPINE LUMBAR | Status: FUNCTIONAL

## 2021-11-07 DEVICE — SET SCREW EXPEDIUM VERSE UNITZ: Type: IMPLANTABLE DEVICE | Site: SPINE LUMBAR | Status: FUNCTIONAL

## 2021-11-07 DEVICE — IMPLANTABLE DEVICE: Type: IMPLANTABLE DEVICE | Site: SPINE LUMBAR | Status: FUNCTIONAL

## 2021-11-07 DEVICE — SCREW EXPEDIUM FEN STRL 5X45MM: Type: IMPLANTABLE DEVICE | Site: SPINE LUMBAR | Status: FUNCTIONAL

## 2021-11-07 DEVICE — ROD SPINAL PRECUT 5.5 X 480MM: Type: IMPLANTABLE DEVICE | Site: SPINE LUMBAR | Status: FUNCTIONAL

## 2021-11-07 DEVICE — SCREW INNER SINGLE SET TITANIU: Type: IMPLANTABLE DEVICE | Site: SPINE LUMBAR | Status: FUNCTIONAL

## 2021-11-07 DEVICE — SCREW BONE SPINAL 5.5 6 X 50MM: Type: IMPLANTABLE DEVICE | Site: SPINE LUMBAR | Status: FUNCTIONAL

## 2021-11-07 DEVICE — KIT MED BONE INFUSE: Type: IMPLANTABLE DEVICE | Site: SPINE LUMBAR | Status: FUNCTIONAL

## 2021-11-07 RX ORDER — MUPIROCIN 20 MG/G
OINTMENT TOPICAL 2 TIMES DAILY
Status: DISPENSED | OUTPATIENT
Start: 2021-11-07 | End: 2021-11-12

## 2021-11-07 RX ORDER — BUPIVACAINE HYDROCHLORIDE AND EPINEPHRINE 5; 5 MG/ML; UG/ML
INJECTION, SOLUTION EPIDURAL; INTRACAUDAL; PERINEURAL
Status: DISCONTINUED | OUTPATIENT
Start: 2021-11-07 | End: 2021-11-07 | Stop reason: HOSPADM

## 2021-11-07 RX ORDER — SODIUM CHLORIDE 9 MG/ML
INJECTION, SOLUTION INTRAVENOUS ONCE
Status: DISCONTINUED | OUTPATIENT
Start: 2021-11-07 | End: 2021-11-12 | Stop reason: HOSPADM

## 2021-11-07 RX ORDER — LABETALOL HCL 20 MG/4 ML
5 SYRINGE (ML) INTRAVENOUS EVERY 10 MIN PRN
Status: DISCONTINUED | OUTPATIENT
Start: 2021-11-07 | End: 2021-11-12 | Stop reason: HOSPADM

## 2021-11-07 RX ORDER — MIDAZOLAM HYDROCHLORIDE 1 MG/ML
INJECTION, SOLUTION INTRAMUSCULAR; INTRAVENOUS
Status: DISCONTINUED | OUTPATIENT
Start: 2021-11-07 | End: 2021-11-07

## 2021-11-07 RX ORDER — CEFAZOLIN SODIUM 1 G/3ML
2 INJECTION, POWDER, FOR SOLUTION INTRAMUSCULAR; INTRAVENOUS
Status: COMPLETED | OUTPATIENT
Start: 2021-11-07 | End: 2021-11-08

## 2021-11-07 RX ORDER — ONDANSETRON 2 MG/ML
INJECTION INTRAMUSCULAR; INTRAVENOUS
Status: DISCONTINUED | OUTPATIENT
Start: 2021-11-07 | End: 2021-11-07

## 2021-11-07 RX ORDER — ROCURONIUM BROMIDE 10 MG/ML
INJECTION, SOLUTION INTRAVENOUS
Status: DISCONTINUED | OUTPATIENT
Start: 2021-11-07 | End: 2021-11-07

## 2021-11-07 RX ORDER — INSULIN ASPART 100 [IU]/ML
0-5 INJECTION, SOLUTION INTRAVENOUS; SUBCUTANEOUS
Status: DISCONTINUED | OUTPATIENT
Start: 2021-11-07 | End: 2021-11-12 | Stop reason: HOSPADM

## 2021-11-07 RX ORDER — MUPIROCIN 20 MG/G
OINTMENT TOPICAL
Status: DISCONTINUED | OUTPATIENT
Start: 2021-11-07 | End: 2021-11-07 | Stop reason: HOSPADM

## 2021-11-07 RX ORDER — CEFAZOLIN SODIUM 1 G/3ML
2 INJECTION, POWDER, FOR SOLUTION INTRAMUSCULAR; INTRAVENOUS
Status: DISCONTINUED | OUTPATIENT
Start: 2021-11-07 | End: 2021-11-07 | Stop reason: HOSPADM

## 2021-11-07 RX ORDER — SODIUM CHLORIDE 9 MG/ML
INJECTION, SOLUTION INTRAVENOUS
Status: DISCONTINUED | OUTPATIENT
Start: 2021-11-07 | End: 2021-11-12 | Stop reason: HOSPADM

## 2021-11-07 RX ORDER — VANCOMYCIN HYDROCHLORIDE 1 G/20ML
INJECTION, POWDER, LYOPHILIZED, FOR SOLUTION INTRAVENOUS
Status: DISCONTINUED | OUTPATIENT
Start: 2021-11-07 | End: 2021-11-07 | Stop reason: HOSPADM

## 2021-11-07 RX ORDER — FENTANYL CITRATE 50 UG/ML
INJECTION, SOLUTION INTRAMUSCULAR; INTRAVENOUS
Status: DISCONTINUED | OUTPATIENT
Start: 2021-11-07 | End: 2021-11-07

## 2021-11-07 RX ORDER — PROPOFOL 10 MG/ML
VIAL (ML) INTRAVENOUS
Status: DISCONTINUED | OUTPATIENT
Start: 2021-11-07 | End: 2021-11-07

## 2021-11-07 RX ORDER — KETAMINE HCL IN 0.9 % NACL 50 MG/5 ML
SYRINGE (ML) INTRAVENOUS
Status: DISCONTINUED | OUTPATIENT
Start: 2021-11-07 | End: 2021-11-07

## 2021-11-07 RX ORDER — FENTANYL CITRATE 50 UG/ML
25 INJECTION, SOLUTION INTRAMUSCULAR; INTRAVENOUS EVERY 5 MIN PRN
Status: COMPLETED | OUTPATIENT
Start: 2021-11-07 | End: 2021-11-07

## 2021-11-07 RX ORDER — ESMOLOL HYDROCHLORIDE 10 MG/ML
INJECTION INTRAVENOUS
Status: DISCONTINUED | OUTPATIENT
Start: 2021-11-07 | End: 2021-11-07

## 2021-11-07 RX ORDER — HEPARIN SODIUM 5000 [USP'U]/ML
5000 INJECTION, SOLUTION INTRAVENOUS; SUBCUTANEOUS EVERY 8 HOURS
Status: DISCONTINUED | OUTPATIENT
Start: 2021-11-08 | End: 2021-11-08

## 2021-11-07 RX ORDER — PROPOFOL 10 MG/ML
VIAL (ML) INTRAVENOUS CONTINUOUS PRN
Status: DISCONTINUED | OUTPATIENT
Start: 2021-11-07 | End: 2021-11-07

## 2021-11-07 RX ORDER — SODIUM CHLORIDE 0.9 % (FLUSH) 0.9 %
10 SYRINGE (ML) INJECTION
Status: DISCONTINUED | OUTPATIENT
Start: 2021-11-07 | End: 2021-11-12 | Stop reason: HOSPADM

## 2021-11-07 RX ORDER — LABETALOL HYDROCHLORIDE 5 MG/ML
INJECTION, SOLUTION INTRAVENOUS
Status: DISCONTINUED | OUTPATIENT
Start: 2021-11-07 | End: 2021-11-07

## 2021-11-07 RX ORDER — HALOPERIDOL 5 MG/ML
0.5 INJECTION INTRAMUSCULAR EVERY 10 MIN PRN
Status: DISCONTINUED | OUTPATIENT
Start: 2021-11-07 | End: 2021-11-07 | Stop reason: HOSPADM

## 2021-11-07 RX ORDER — LIDOCAINE HYDROCHLORIDE 20 MG/ML
INJECTION INTRAVENOUS
Status: DISCONTINUED | OUTPATIENT
Start: 2021-11-07 | End: 2021-11-07

## 2021-11-07 RX ORDER — TRANEXAMIC ACID 100 MG/ML
INJECTION, SOLUTION INTRAVENOUS
Status: DISCONTINUED | OUTPATIENT
Start: 2021-11-07 | End: 2021-11-07

## 2021-11-07 RX ORDER — OXYCODONE HYDROCHLORIDE 5 MG/1
5 TABLET ORAL
Status: DISCONTINUED | OUTPATIENT
Start: 2021-11-07 | End: 2021-11-07 | Stop reason: HOSPADM

## 2021-11-07 RX ORDER — CEFAZOLIN SODIUM 1 G/3ML
INJECTION, POWDER, FOR SOLUTION INTRAMUSCULAR; INTRAVENOUS
Status: DISCONTINUED | OUTPATIENT
Start: 2021-11-07 | End: 2021-11-07

## 2021-11-07 RX ORDER — SODIUM CHLORIDE 0.9 % (FLUSH) 0.9 %
10 SYRINGE (ML) INJECTION
Status: DISCONTINUED | OUTPATIENT
Start: 2021-11-07 | End: 2021-11-07 | Stop reason: HOSPADM

## 2021-11-07 RX ORDER — METHOCARBAMOL 750 MG/1
750 TABLET, FILM COATED ORAL 3 TIMES DAILY
Status: DISCONTINUED | OUTPATIENT
Start: 2021-11-07 | End: 2021-11-12 | Stop reason: HOSPADM

## 2021-11-07 RX ORDER — ACETAMINOPHEN 10 MG/ML
INJECTION, SOLUTION INTRAVENOUS
Status: DISCONTINUED | OUTPATIENT
Start: 2021-11-07 | End: 2021-11-07

## 2021-11-07 RX ORDER — DEXAMETHASONE SODIUM PHOSPHATE 4 MG/ML
INJECTION, SOLUTION INTRA-ARTICULAR; INTRALESIONAL; INTRAMUSCULAR; INTRAVENOUS; SOFT TISSUE
Status: DISCONTINUED | OUTPATIENT
Start: 2021-11-07 | End: 2021-11-07

## 2021-11-07 RX ORDER — FAMOTIDINE 10 MG/ML
INJECTION INTRAVENOUS
Status: DISCONTINUED | OUTPATIENT
Start: 2021-11-07 | End: 2021-11-07

## 2021-11-07 RX ORDER — LIDOCAINE HYDROCHLORIDE AND EPINEPHRINE 10; 10 MG/ML; UG/ML
INJECTION, SOLUTION INFILTRATION; PERINEURAL
Status: DISCONTINUED | OUTPATIENT
Start: 2021-11-07 | End: 2021-11-07 | Stop reason: HOSPADM

## 2021-11-07 RX ADMIN — SODIUM CHLORIDE: 0.9 INJECTION, SOLUTION INTRAVENOUS at 09:11

## 2021-11-07 RX ADMIN — LABETALOL HYDROCHLORIDE 5 MG: 5 INJECTION, SOLUTION INTRAVENOUS at 01:11

## 2021-11-07 RX ADMIN — FENTANYL CITRATE 25 MCG: 50 INJECTION INTRAMUSCULAR; INTRAVENOUS at 01:11

## 2021-11-07 RX ADMIN — ONDANSETRON 4 MG: 2 INJECTION INTRAMUSCULAR; INTRAVENOUS at 09:11

## 2021-11-07 RX ADMIN — PROPOFOL 150 MCG/KG/MIN: 10 INJECTION, EMULSION INTRAVENOUS at 09:11

## 2021-11-07 RX ADMIN — LABETALOL HYDROCHLORIDE 2.5 MG: 5 INJECTION, SOLUTION INTRAVENOUS at 10:11

## 2021-11-07 RX ADMIN — MUPIROCIN: 20 OINTMENT TOPICAL at 07:11

## 2021-11-07 RX ADMIN — Medication 150 MG: at 09:11

## 2021-11-07 RX ADMIN — MUPIROCIN: 20 OINTMENT TOPICAL at 09:11

## 2021-11-07 RX ADMIN — ACETAMINOPHEN 1000 MG: 10 INJECTION, SOLUTION INTRAVENOUS at 11:11

## 2021-11-07 RX ADMIN — METHOCARBAMOL 750 MG: 750 TABLET ORAL at 03:11

## 2021-11-07 RX ADMIN — OXYCODONE HYDROCHLORIDE 10 MG: 10 TABLET ORAL at 09:11

## 2021-11-07 RX ADMIN — LABETALOL HYDROCHLORIDE 5 MG: 5 INJECTION, SOLUTION INTRAVENOUS at 03:11

## 2021-11-07 RX ADMIN — SODIUM CHLORIDE 0.25 MCG/KG/MIN: 9 INJECTION, SOLUTION INTRAVENOUS at 09:11

## 2021-11-07 RX ADMIN — CEFAZOLIN 2 G: 330 INJECTION, POWDER, FOR SOLUTION INTRAMUSCULAR; INTRAVENOUS at 09:11

## 2021-11-07 RX ADMIN — LIDOCAINE HYDROCHLORIDE 100 MG: 20 INJECTION, SOLUTION INTRAVENOUS at 09:11

## 2021-11-07 RX ADMIN — MIDAZOLAM 2 MG: 1 INJECTION INTRAMUSCULAR; INTRAVENOUS at 09:11

## 2021-11-07 RX ADMIN — SUGAMMADEX 200 MG: 100 INJECTION, SOLUTION INTRAVENOUS at 10:11

## 2021-11-07 RX ADMIN — ROCURONIUM BROMIDE 40 MG: 10 INJECTION, SOLUTION INTRAVENOUS at 09:11

## 2021-11-07 RX ADMIN — FENTANYL CITRATE 25 MCG: 50 INJECTION INTRAMUSCULAR; INTRAVENOUS at 03:11

## 2021-11-07 RX ADMIN — Medication 125 MG: at 11:11

## 2021-11-07 RX ADMIN — FENTANYL CITRATE 25 MCG: 50 INJECTION INTRAMUSCULAR; INTRAVENOUS at 02:11

## 2021-11-07 RX ADMIN — DEXAMETHASONE SODIUM PHOSPHATE 8 MG: 4 INJECTION INTRA-ARTICULAR; INTRALESIONAL; INTRAMUSCULAR; INTRAVENOUS; SOFT TISSUE at 09:11

## 2021-11-07 RX ADMIN — FENTANYL CITRATE 100 MCG: 50 INJECTION INTRAMUSCULAR; INTRAVENOUS at 09:11

## 2021-11-07 RX ADMIN — ONDANSETRON 4 MG: 2 INJECTION INTRAMUSCULAR; INTRAVENOUS at 11:11

## 2021-11-07 RX ADMIN — OXYCODONE HYDROCHLORIDE 10 MG: 10 TABLET ORAL at 02:11

## 2021-11-07 RX ADMIN — TRANEXAMIC ACID 1000 MG: 1 INJECTION, SOLUTION INTRAVENOUS at 09:11

## 2021-11-07 RX ADMIN — Medication 20 MG: at 09:11

## 2021-11-07 RX ADMIN — LISINOPRIL 20 MG: 20 TABLET ORAL at 04:11

## 2021-11-07 RX ADMIN — FAMOTIDINE 20 MG: 10 INJECTION INTRAVENOUS at 09:11

## 2021-11-07 RX ADMIN — ONDANSETRON 4 MG: 2 INJECTION INTRAMUSCULAR; INTRAVENOUS at 05:11

## 2021-11-07 RX ADMIN — OXYCODONE HYDROCHLORIDE 10 MG: 10 TABLET ORAL at 06:11

## 2021-11-07 RX ADMIN — REMIFENTANIL HYDROCHLORIDE 0.05 MCG/KG/MIN: 1 INJECTION, POWDER, LYOPHILIZED, FOR SOLUTION INTRAVENOUS at 09:11

## 2021-11-07 RX ADMIN — GLYCOPYRROLATE 0.1 MG: 0.2 INJECTION, SOLUTION INTRAMUSCULAR; INTRAVITREAL at 09:11

## 2021-11-07 RX ADMIN — CEFAZOLIN 2 G: 330 INJECTION, POWDER, FOR SOLUTION INTRAMUSCULAR; INTRAVENOUS at 06:11

## 2021-11-07 RX ADMIN — ESMOLOL HYDROCHLORIDE 30 MG: 100 INJECTION, SOLUTION INTRAVENOUS at 10:11

## 2021-11-07 RX ADMIN — AMLODIPINE BESYLATE 10 MG: 10 TABLET ORAL at 04:11

## 2021-11-07 RX ADMIN — ROCURONIUM BROMIDE 20 MG: 10 INJECTION, SOLUTION INTRAVENOUS at 10:11

## 2021-11-07 RX ADMIN — METHOCARBAMOL 750 MG: 750 TABLET ORAL at 09:11

## 2021-11-08 PROBLEM — D62 ACUTE BLOOD LOSS ANEMIA: Status: ACTIVE | Noted: 2021-11-08

## 2021-11-08 PROBLEM — M89.9 CHRONIC KIDNEY DISEASE-MINERAL AND BONE DISORDER: Status: ACTIVE | Noted: 2021-11-08

## 2021-11-08 PROBLEM — E83.9 CHRONIC KIDNEY DISEASE-MINERAL AND BONE DISORDER: Status: ACTIVE | Noted: 2021-11-08

## 2021-11-08 PROBLEM — N18.9 CHRONIC KIDNEY DISEASE-MINERAL AND BONE DISORDER: Status: ACTIVE | Noted: 2021-11-08

## 2021-11-08 PROBLEM — N18.6 ANEMIA IN ESRD (END-STAGE RENAL DISEASE): Status: ACTIVE | Noted: 2020-03-02

## 2021-11-08 LAB
ALBUMIN SERPL BCP-MCNC: 2.8 G/DL (ref 3.5–5.2)
ALP SERPL-CCNC: 57 U/L (ref 55–135)
ALT SERPL W/O P-5'-P-CCNC: 14 U/L (ref 10–44)
ANION GAP SERPL CALC-SCNC: 13 MMOL/L (ref 8–16)
APTT BLDCRRT: 27.4 SEC (ref 21–32)
ASCENDING AORTA: 3.18 CM
AST SERPL-CCNC: 31 U/L (ref 10–40)
AV INDEX (PROSTH): 0.89
AV MEAN GRADIENT: 3 MMHG
AV PEAK GRADIENT: 6 MMHG
AV VALVE AREA: 3.56 CM2
AV VELOCITY RATIO: 0.93
BASOPHILS # BLD AUTO: 0.01 K/UL (ref 0–0.2)
BASOPHILS NFR BLD: 0.2 % (ref 0–1.9)
BILIRUB SERPL-MCNC: 0.6 MG/DL (ref 0.1–1)
BSA FOR ECHO PROCEDURE: 2.1 M2
BUN SERPL-MCNC: 27 MG/DL (ref 8–23)
CALCIUM SERPL-MCNC: 8.3 MG/DL (ref 8.7–10.5)
CHLORIDE SERPL-SCNC: 101 MMOL/L (ref 95–110)
CO2 SERPL-SCNC: 21 MMOL/L (ref 23–29)
CREAT SERPL-MCNC: 6.2 MG/DL (ref 0.5–1.4)
CV ECHO LV RWT: 0.51 CM
DIFFERENTIAL METHOD: ABNORMAL
DOP CALC AO PEAK VEL: 1.23 M/S
DOP CALC AO VTI: 25.19 CM
DOP CALC LVOT AREA: 4 CM2
DOP CALC LVOT DIAMETER: 2.25 CM
DOP CALC LVOT PEAK VEL: 1.15 M/S
DOP CALC LVOT STROKE VOLUME: 89.58 CM3
DOP CALCLVOT PEAK VEL VTI: 22.54 CM
E WAVE DECELERATION TIME: 350.45 MSEC
E/A RATIO: 0.63
E/E' RATIO: 8.35 M/S
ECHO LV POSTERIOR WALL: 1.12 CM (ref 0.6–1.1)
EJECTION FRACTION: 60 %
EOSINOPHIL # BLD AUTO: 0 K/UL (ref 0–0.5)
EOSINOPHIL NFR BLD: 0 % (ref 0–8)
ERYTHROCYTE [DISTWIDTH] IN BLOOD BY AUTOMATED COUNT: 15.5 % (ref 11.5–14.5)
EST. GFR  (AFRICAN AMERICAN): 10.1 ML/MIN/1.73 M^2
EST. GFR  (NON AFRICAN AMERICAN): 8.7 ML/MIN/1.73 M^2
FRACTIONAL SHORTENING: 52 % (ref 28–44)
GLUCOSE SERPL-MCNC: 124 MG/DL (ref 70–110)
HCT VFR BLD AUTO: 21.9 % (ref 40–54)
HGB BLD-MCNC: 7.5 G/DL (ref 14–18)
IMM GRANULOCYTES # BLD AUTO: 0.11 K/UL (ref 0–0.04)
IMM GRANULOCYTES NFR BLD AUTO: 2.1 % (ref 0–0.5)
INTERVENTRICULAR SEPTUM: 1.4 CM (ref 0.6–1.1)
LA MAJOR: 4.73 CM
LA MINOR: 5.1 CM
LA WIDTH: 4.6 CM
LEFT ATRIUM SIZE: 2.82 CM
LEFT ATRIUM VOLUME INDEX MOD: 19.5 ML/M2
LEFT ATRIUM VOLUME INDEX: 26.1 ML/M2
LEFT ATRIUM VOLUME MOD: 40.43 CM3
LEFT ATRIUM VOLUME: 54.12 CM3
LEFT INTERNAL DIMENSION IN SYSTOLE: 2.12 CM (ref 2.1–4)
LEFT VENTRICLE DIASTOLIC VOLUME INDEX: 42.59 ML/M2
LEFT VENTRICLE DIASTOLIC VOLUME: 88.16 ML
LEFT VENTRICLE MASS INDEX: 100 G/M2
LEFT VENTRICLE SYSTOLIC VOLUME INDEX: 7.1 ML/M2
LEFT VENTRICLE SYSTOLIC VOLUME: 14.73 ML
LEFT VENTRICULAR INTERNAL DIMENSION IN DIASTOLE: 4.41 CM (ref 3.5–6)
LEFT VENTRICULAR MASS: 206.14 G
LV LATERAL E/E' RATIO: 7.89 M/S
LV SEPTAL E/E' RATIO: 8.88 M/S
LYMPHOCYTES # BLD AUTO: 0.6 K/UL (ref 1–4.8)
LYMPHOCYTES NFR BLD: 12.1 % (ref 18–48)
MCH RBC QN AUTO: 35 PG (ref 27–31)
MCHC RBC AUTO-ENTMCNC: 34.2 G/DL (ref 32–36)
MCV RBC AUTO: 102 FL (ref 82–98)
MONOCYTES # BLD AUTO: 0.4 K/UL (ref 0.3–1)
MONOCYTES NFR BLD: 8.1 % (ref 4–15)
MV PEAK A VEL: 1.12 M/S
MV PEAK E VEL: 0.71 M/S
MV STENOSIS PRESSURE HALF TIME: 101.63 MS
MV VALVE AREA P 1/2 METHOD: 2.16 CM2
NEUTROPHILS # BLD AUTO: 4 K/UL (ref 1.8–7.7)
NEUTROPHILS NFR BLD: 77.5 % (ref 38–73)
NRBC BLD-RTO: 0 /100 WBC
PISA TR MAX VEL: 1.64 M/S
PLATELET # BLD AUTO: 72 K/UL (ref 150–450)
PMV BLD AUTO: 12.8 FL (ref 9.2–12.9)
POCT GLUCOSE: 124 MG/DL (ref 70–110)
POCT GLUCOSE: 140 MG/DL (ref 70–110)
POCT GLUCOSE: 150 MG/DL (ref 70–110)
POTASSIUM SERPL-SCNC: 4.6 MMOL/L (ref 3.5–5.1)
PROT SERPL-MCNC: 5.1 G/DL (ref 6–8.4)
RA PRESSURE: 3 MMHG
RBC # BLD AUTO: 2.14 M/UL (ref 4.6–6.2)
RV TISSUE DOPPLER FREE WALL SYSTOLIC VELOCITY 1 (APICAL 4 CHAMBER VIEW): 12.51 CM/S
SINUS: 4 CM
SODIUM SERPL-SCNC: 135 MMOL/L (ref 136–145)
STJ: 3.43 CM
TDI LATERAL: 0.09 M/S
TDI SEPTAL: 0.08 M/S
TDI: 0.09 M/S
TR MAX PG: 11 MMHG
TRICUSPID ANNULAR PLANE SYSTOLIC EXCURSION: 2.23 CM
TV REST PULMONARY ARTERY PRESSURE: 14 MMHG
WBC # BLD AUTO: 5.19 K/UL (ref 3.9–12.7)

## 2021-11-08 PROCEDURE — 63600175 PHARM REV CODE 636 W HCPCS: Mod: JG | Performed by: INTERNAL MEDICINE

## 2021-11-08 PROCEDURE — 25000003 PHARM REV CODE 250: Performed by: STUDENT IN AN ORGANIZED HEALTH CARE EDUCATION/TRAINING PROGRAM

## 2021-11-08 PROCEDURE — 97530 THERAPEUTIC ACTIVITIES: CPT

## 2021-11-08 PROCEDURE — 63600175 PHARM REV CODE 636 W HCPCS: Performed by: STUDENT IN AN ORGANIZED HEALTH CARE EDUCATION/TRAINING PROGRAM

## 2021-11-08 PROCEDURE — 25000003 PHARM REV CODE 250: Performed by: ANESTHESIOLOGY

## 2021-11-08 PROCEDURE — 97161 PT EVAL LOW COMPLEX 20 MIN: CPT

## 2021-11-08 PROCEDURE — 97165 OT EVAL LOW COMPLEX 30 MIN: CPT

## 2021-11-08 PROCEDURE — 36415 COLL VENOUS BLD VENIPUNCTURE: CPT | Performed by: STUDENT IN AN ORGANIZED HEALTH CARE EDUCATION/TRAINING PROGRAM

## 2021-11-08 PROCEDURE — 25000003 PHARM REV CODE 250: Performed by: INTERNAL MEDICINE

## 2021-11-08 PROCEDURE — 99232 SBSQ HOSP IP/OBS MODERATE 35: CPT | Mod: ,,, | Performed by: INTERNAL MEDICINE

## 2021-11-08 PROCEDURE — P9021 RED BLOOD CELLS UNIT: HCPCS | Performed by: STUDENT IN AN ORGANIZED HEALTH CARE EDUCATION/TRAINING PROGRAM

## 2021-11-08 PROCEDURE — 99233 SBSQ HOSP IP/OBS HIGH 50: CPT | Mod: GC,,, | Performed by: HOSPITALIST

## 2021-11-08 PROCEDURE — 80100016 HC MAINTENANCE HEMODIALYSIS

## 2021-11-08 PROCEDURE — 99232 PR SUBSEQUENT HOSPITAL CARE,LEVL II: ICD-10-PCS | Mod: ,,, | Performed by: INTERNAL MEDICINE

## 2021-11-08 PROCEDURE — 11000001 HC ACUTE MED/SURG PRIVATE ROOM

## 2021-11-08 PROCEDURE — 80053 COMPREHEN METABOLIC PANEL: CPT | Performed by: STUDENT IN AN ORGANIZED HEALTH CARE EDUCATION/TRAINING PROGRAM

## 2021-11-08 PROCEDURE — 99233 PR SUBSEQUENT HOSPITAL CARE,LEVL III: ICD-10-PCS | Mod: GC,,, | Performed by: HOSPITALIST

## 2021-11-08 PROCEDURE — 85025 COMPLETE CBC W/AUTO DIFF WBC: CPT | Mod: 91 | Performed by: HOSPITALIST

## 2021-11-08 PROCEDURE — 85025 COMPLETE CBC W/AUTO DIFF WBC: CPT | Performed by: STUDENT IN AN ORGANIZED HEALTH CARE EDUCATION/TRAINING PROGRAM

## 2021-11-08 PROCEDURE — 36415 COLL VENOUS BLD VENIPUNCTURE: CPT | Performed by: HOSPITALIST

## 2021-11-08 PROCEDURE — 63600175 PHARM REV CODE 636 W HCPCS: Performed by: HOSPITALIST

## 2021-11-08 PROCEDURE — 85730 THROMBOPLASTIN TIME PARTIAL: CPT | Performed by: HOSPITALIST

## 2021-11-08 RX ORDER — CARVEDILOL 6.25 MG/1
6.25 TABLET ORAL 2 TIMES DAILY WITH MEALS
Status: DISCONTINUED | OUTPATIENT
Start: 2021-11-08 | End: 2021-11-08

## 2021-11-08 RX ORDER — HEPARIN SODIUM,PORCINE/D5W 25000/250
0-40 INTRAVENOUS SOLUTION INTRAVENOUS CONTINUOUS
Status: DISCONTINUED | OUTPATIENT
Start: 2021-11-08 | End: 2021-11-09

## 2021-11-08 RX ORDER — CARVEDILOL 6.25 MG/1
6.25 TABLET ORAL 2 TIMES DAILY WITH MEALS
Status: DISCONTINUED | OUTPATIENT
Start: 2021-11-08 | End: 2021-11-12 | Stop reason: HOSPADM

## 2021-11-08 RX ORDER — HYDROCODONE BITARTRATE AND ACETAMINOPHEN 500; 5 MG/1; MG/1
TABLET ORAL
Status: DISCONTINUED | OUTPATIENT
Start: 2021-11-08 | End: 2021-11-12 | Stop reason: HOSPADM

## 2021-11-08 RX ORDER — HYDRALAZINE HYDROCHLORIDE 25 MG/1
25 TABLET, FILM COATED ORAL EVERY 8 HOURS PRN
Status: DISCONTINUED | OUTPATIENT
Start: 2021-11-08 | End: 2021-11-12 | Stop reason: HOSPADM

## 2021-11-08 RX ORDER — POLYETHYLENE GLYCOL 3350 17 G/17G
17 POWDER, FOR SOLUTION ORAL DAILY
Status: DISCONTINUED | OUTPATIENT
Start: 2021-11-08 | End: 2021-11-09

## 2021-11-08 RX ORDER — ACETAMINOPHEN 500 MG
1000 TABLET ORAL 3 TIMES DAILY
Status: DISCONTINUED | OUTPATIENT
Start: 2021-11-08 | End: 2021-11-12 | Stop reason: HOSPADM

## 2021-11-08 RX ADMIN — CEFAZOLIN 2 G: 330 INJECTION, POWDER, FOR SOLUTION INTRAMUSCULAR; INTRAVENOUS at 02:11

## 2021-11-08 RX ADMIN — CARVEDILOL 6.25 MG: 6.25 TABLET, FILM COATED ORAL at 03:11

## 2021-11-08 RX ADMIN — LISINOPRIL 20 MG: 20 TABLET ORAL at 03:11

## 2021-11-08 RX ADMIN — ONDANSETRON 4 MG: 2 INJECTION INTRAMUSCULAR; INTRAVENOUS at 05:11

## 2021-11-08 RX ADMIN — EPOETIN ALFA-EPBX 4540 UNITS: 4000 INJECTION, SOLUTION INTRAVENOUS; SUBCUTANEOUS at 01:11

## 2021-11-08 RX ADMIN — MUPIROCIN: 20 OINTMENT TOPICAL at 09:11

## 2021-11-08 RX ADMIN — ACETAMINOPHEN 1000 MG: 500 TABLET ORAL at 09:11

## 2021-11-08 RX ADMIN — CEFAZOLIN 2 G: 330 INJECTION, POWDER, FOR SOLUTION INTRAMUSCULAR; INTRAVENOUS at 03:11

## 2021-11-08 RX ADMIN — AMLODIPINE BESYLATE 10 MG: 10 TABLET ORAL at 03:11

## 2021-11-08 RX ADMIN — HEPARIN SODIUM 18 UNITS/KG/HR: 10000 INJECTION, SOLUTION INTRAVENOUS at 11:11

## 2021-11-08 RX ADMIN — LABETALOL HYDROCHLORIDE 5 MG: 5 INJECTION, SOLUTION INTRAVENOUS at 03:11

## 2021-11-08 RX ADMIN — ACETAMINOPHEN 1000 MG: 500 TABLET ORAL at 03:11

## 2021-11-08 RX ADMIN — OXYCODONE HYDROCHLORIDE 10 MG: 10 TABLET ORAL at 05:11

## 2021-11-08 RX ADMIN — ATORVASTATIN CALCIUM 40 MG: 20 TABLET, FILM COATED ORAL at 03:11

## 2021-11-08 RX ADMIN — METHOCARBAMOL 750 MG: 750 TABLET ORAL at 09:11

## 2021-11-08 RX ADMIN — METHOCARBAMOL 750 MG: 750 TABLET ORAL at 02:11

## 2021-11-08 RX ADMIN — HEPARIN SODIUM 5000 UNITS: 5000 INJECTION INTRAVENOUS; SUBCUTANEOUS at 02:11

## 2021-11-08 RX ADMIN — HEPARIN SODIUM 5000 UNITS: 5000 INJECTION INTRAVENOUS; SUBCUTANEOUS at 05:11

## 2021-11-09 PROBLEM — I82.629 ACUTE DEEP VEIN THROMBOSIS (DVT) OF UPPER EXTREMITY: Status: ACTIVE | Noted: 2021-11-09

## 2021-11-09 LAB
ALBUMIN SERPL BCP-MCNC: 2.6 G/DL (ref 3.5–5.2)
ALP SERPL-CCNC: 55 U/L (ref 55–135)
ALT SERPL W/O P-5'-P-CCNC: 5 U/L (ref 10–44)
ANION GAP SERPL CALC-SCNC: 9 MMOL/L (ref 8–16)
APTT BLDCRRT: >150 SEC (ref 21–32)
AST SERPL-CCNC: 31 U/L (ref 10–40)
BASOPHILS # BLD AUTO: 0 K/UL (ref 0–0.2)
BASOPHILS NFR BLD: 0 % (ref 0–1.9)
BILIRUB SERPL-MCNC: 1.1 MG/DL (ref 0.1–1)
BUN SERPL-MCNC: 21 MG/DL (ref 8–23)
CALCIUM SERPL-MCNC: 8.1 MG/DL (ref 8.7–10.5)
CHLORIDE SERPL-SCNC: 100 MMOL/L (ref 95–110)
CO2 SERPL-SCNC: 30 MMOL/L (ref 23–29)
CREAT SERPL-MCNC: 5.3 MG/DL (ref 0.5–1.4)
DIFFERENTIAL METHOD: ABNORMAL
EOSINOPHIL # BLD AUTO: 0 K/UL (ref 0–0.5)
EOSINOPHIL NFR BLD: 0.2 % (ref 0–8)
EOSINOPHIL NFR BLD: 0.2 % (ref 0–8)
EOSINOPHIL NFR BLD: 0.3 % (ref 0–8)
ERYTHROCYTE [DISTWIDTH] IN BLOOD BY AUTOMATED COUNT: 16.8 % (ref 11.5–14.5)
ERYTHROCYTE [DISTWIDTH] IN BLOOD BY AUTOMATED COUNT: 16.9 % (ref 11.5–14.5)
ERYTHROCYTE [DISTWIDTH] IN BLOOD BY AUTOMATED COUNT: 16.9 % (ref 11.5–14.5)
EST. GFR  (AFRICAN AMERICAN): 12.2 ML/MIN/1.73 M^2
EST. GFR  (NON AFRICAN AMERICAN): 10.5 ML/MIN/1.73 M^2
FOLATE SERPL-MCNC: <2.2 NG/ML (ref 4–24)
GLUCOSE SERPL-MCNC: 116 MG/DL (ref 70–110)
HCT VFR BLD AUTO: 21.5 % (ref 40–54)
HCT VFR BLD AUTO: 23.2 % (ref 40–54)
HCT VFR BLD AUTO: 23.2 % (ref 40–54)
HGB BLD-MCNC: 7.1 G/DL (ref 14–18)
HGB BLD-MCNC: 7.6 G/DL (ref 14–18)
HGB BLD-MCNC: 7.6 G/DL (ref 14–18)
IMM GRANULOCYTES # BLD AUTO: 0.03 K/UL (ref 0–0.04)
IMM GRANULOCYTES # BLD AUTO: 0.04 K/UL (ref 0–0.04)
IMM GRANULOCYTES # BLD AUTO: 0.04 K/UL (ref 0–0.04)
IMM GRANULOCYTES NFR BLD AUTO: 0.8 % (ref 0–0.5)
IMM GRANULOCYTES NFR BLD AUTO: 1 % (ref 0–0.5)
IMM GRANULOCYTES NFR BLD AUTO: 1 % (ref 0–0.5)
IRON SERPL-MCNC: 19 UG/DL (ref 45–160)
LYMPHOCYTES # BLD AUTO: 0.5 K/UL (ref 1–4.8)
LYMPHOCYTES # BLD AUTO: 0.6 K/UL (ref 1–4.8)
LYMPHOCYTES # BLD AUTO: 0.6 K/UL (ref 1–4.8)
LYMPHOCYTES NFR BLD: 12.5 % (ref 18–48)
LYMPHOCYTES NFR BLD: 13.4 % (ref 18–48)
LYMPHOCYTES NFR BLD: 13.4 % (ref 18–48)
MCH RBC QN AUTO: 33 PG (ref 27–31)
MCH RBC QN AUTO: 33 PG (ref 27–31)
MCH RBC QN AUTO: 33.8 PG (ref 27–31)
MCHC RBC AUTO-ENTMCNC: 32.8 G/DL (ref 32–36)
MCHC RBC AUTO-ENTMCNC: 32.8 G/DL (ref 32–36)
MCHC RBC AUTO-ENTMCNC: 33 G/DL (ref 32–36)
MCV RBC AUTO: 101 FL (ref 82–98)
MCV RBC AUTO: 101 FL (ref 82–98)
MCV RBC AUTO: 102 FL (ref 82–98)
MONOCYTES # BLD AUTO: 0.3 K/UL (ref 0.3–1)
MONOCYTES NFR BLD: 7 % (ref 4–15)
MONOCYTES NFR BLD: 7.1 % (ref 4–15)
MONOCYTES NFR BLD: 7.1 % (ref 4–15)
NEUTROPHILS # BLD AUTO: 3.2 K/UL (ref 1.8–7.7)
NEUTROPHILS NFR BLD: 78.3 % (ref 38–73)
NEUTROPHILS NFR BLD: 78.3 % (ref 38–73)
NEUTROPHILS NFR BLD: 79.4 % (ref 38–73)
NRBC BLD-RTO: 0 /100 WBC
PLATELET # BLD AUTO: 66 K/UL (ref 150–450)
PMV BLD AUTO: 11.1 FL (ref 9.2–12.9)
PMV BLD AUTO: 11.8 FL (ref 9.2–12.9)
PMV BLD AUTO: 11.8 FL (ref 9.2–12.9)
POCT GLUCOSE: 119 MG/DL (ref 70–110)
POTASSIUM SERPL-SCNC: 3.6 MMOL/L (ref 3.5–5.1)
PROT SERPL-MCNC: 4.8 G/DL (ref 6–8.4)
RBC # BLD AUTO: 2.1 M/UL (ref 4.6–6.2)
RBC # BLD AUTO: 2.3 M/UL (ref 4.6–6.2)
RBC # BLD AUTO: 2.3 M/UL (ref 4.6–6.2)
SATURATED IRON: 12 % (ref 20–50)
SODIUM SERPL-SCNC: 139 MMOL/L (ref 136–145)
SPECIMEN TO PATHOLOGY - SURGICAL: NORMAL
TOTAL IRON BINDING CAPACITY: 160 UG/DL (ref 250–450)
TRANSFERRIN SERPL-MCNC: 108 MG/DL (ref 200–375)
VIT B12 SERPL-MCNC: 304 PG/ML (ref 210–950)
WBC # BLD AUTO: 4 K/UL (ref 3.9–12.7)
WBC # BLD AUTO: 4.09 K/UL (ref 3.9–12.7)
WBC # BLD AUTO: 4.09 K/UL (ref 3.9–12.7)

## 2021-11-09 PROCEDURE — 99233 SBSQ HOSP IP/OBS HIGH 50: CPT | Mod: GC,,, | Performed by: HOSPITALIST

## 2021-11-09 PROCEDURE — 80053 COMPREHEN METABOLIC PANEL: CPT | Performed by: STUDENT IN AN ORGANIZED HEALTH CARE EDUCATION/TRAINING PROGRAM

## 2021-11-09 PROCEDURE — 85025 COMPLETE CBC W/AUTO DIFF WBC: CPT | Performed by: STUDENT IN AN ORGANIZED HEALTH CARE EDUCATION/TRAINING PROGRAM

## 2021-11-09 PROCEDURE — 25000003 PHARM REV CODE 250: Performed by: STUDENT IN AN ORGANIZED HEALTH CARE EDUCATION/TRAINING PROGRAM

## 2021-11-09 PROCEDURE — 99233 PR SUBSEQUENT HOSPITAL CARE,LEVL III: ICD-10-PCS | Mod: GC,,, | Performed by: HOSPITALIST

## 2021-11-09 PROCEDURE — 85730 THROMBOPLASTIN TIME PARTIAL: CPT | Performed by: HOSPITALIST

## 2021-11-09 PROCEDURE — 97530 THERAPEUTIC ACTIVITIES: CPT

## 2021-11-09 PROCEDURE — 11000001 HC ACUTE MED/SURG PRIVATE ROOM

## 2021-11-09 PROCEDURE — 82746 ASSAY OF FOLIC ACID SERUM: CPT | Performed by: ORTHOPAEDIC SURGERY

## 2021-11-09 PROCEDURE — 84466 ASSAY OF TRANSFERRIN: CPT | Performed by: STUDENT IN AN ORGANIZED HEALTH CARE EDUCATION/TRAINING PROGRAM

## 2021-11-09 PROCEDURE — 97112 NEUROMUSCULAR REEDUCATION: CPT

## 2021-11-09 PROCEDURE — 36415 COLL VENOUS BLD VENIPUNCTURE: CPT | Performed by: ORTHOPAEDIC SURGERY

## 2021-11-09 PROCEDURE — 82607 VITAMIN B-12: CPT | Performed by: ORTHOPAEDIC SURGERY

## 2021-11-09 PROCEDURE — 36415 COLL VENOUS BLD VENIPUNCTURE: CPT | Performed by: HOSPITALIST

## 2021-11-09 PROCEDURE — 82728 ASSAY OF FERRITIN: CPT | Performed by: STUDENT IN AN ORGANIZED HEALTH CARE EDUCATION/TRAINING PROGRAM

## 2021-11-09 RX ORDER — HEPARIN SODIUM 1000 [USP'U]/ML
1000 INJECTION, SOLUTION INTRAVENOUS; SUBCUTANEOUS
Status: DISCONTINUED | OUTPATIENT
Start: 2021-11-10 | End: 2021-11-12 | Stop reason: HOSPADM

## 2021-11-09 RX ORDER — POLYETHYLENE GLYCOL 3350 17 G/17G
17 POWDER, FOR SOLUTION ORAL 2 TIMES DAILY
Status: DISCONTINUED | OUTPATIENT
Start: 2021-11-09 | End: 2021-11-11

## 2021-11-09 RX ORDER — SODIUM CHLORIDE 9 MG/ML
INJECTION, SOLUTION INTRAVENOUS ONCE
Status: DISCONTINUED | OUTPATIENT
Start: 2021-11-10 | End: 2021-11-12 | Stop reason: HOSPADM

## 2021-11-09 RX ORDER — SENNOSIDES 8.6 MG/1
8.6 TABLET ORAL DAILY
Status: DISCONTINUED | OUTPATIENT
Start: 2021-11-09 | End: 2021-11-11

## 2021-11-09 RX ADMIN — POLYETHYLENE GLYCOL 3350 17 G: 17 POWDER, FOR SOLUTION ORAL at 09:11

## 2021-11-09 RX ADMIN — MUPIROCIN: 20 OINTMENT TOPICAL at 09:11

## 2021-11-09 RX ADMIN — AMLODIPINE BESYLATE 10 MG: 10 TABLET ORAL at 09:11

## 2021-11-09 RX ADMIN — MUPIROCIN: 20 OINTMENT TOPICAL at 08:11

## 2021-11-09 RX ADMIN — CARVEDILOL 6.25 MG: 6.25 TABLET, FILM COATED ORAL at 05:11

## 2021-11-09 RX ADMIN — APIXABAN 10 MG: 5 TABLET, FILM COATED ORAL at 12:11

## 2021-11-09 RX ADMIN — OXYCODONE HYDROCHLORIDE 10 MG: 10 TABLET ORAL at 12:11

## 2021-11-09 RX ADMIN — ACETAMINOPHEN 1000 MG: 500 TABLET ORAL at 08:11

## 2021-11-09 RX ADMIN — SENNOSIDES 8.6 MG: 8.6 TABLET, COATED ORAL at 03:11

## 2021-11-09 RX ADMIN — POLYETHYLENE GLYCOL 3350 17 G: 17 POWDER, FOR SOLUTION ORAL at 08:11

## 2021-11-09 RX ADMIN — ACETAMINOPHEN 1000 MG: 500 TABLET ORAL at 09:11

## 2021-11-09 RX ADMIN — ATORVASTATIN CALCIUM 40 MG: 20 TABLET, FILM COATED ORAL at 09:11

## 2021-11-09 RX ADMIN — ACETAMINOPHEN 1000 MG: 500 TABLET ORAL at 03:11

## 2021-11-09 RX ADMIN — APIXABAN 10 MG: 5 TABLET, FILM COATED ORAL at 08:11

## 2021-11-09 RX ADMIN — LISINOPRIL 20 MG: 20 TABLET ORAL at 09:11

## 2021-11-09 RX ADMIN — METHOCARBAMOL 750 MG: 750 TABLET ORAL at 03:11

## 2021-11-09 RX ADMIN — CARVEDILOL 6.25 MG: 6.25 TABLET, FILM COATED ORAL at 09:11

## 2021-11-09 RX ADMIN — METHOCARBAMOL 750 MG: 750 TABLET ORAL at 09:11

## 2021-11-09 RX ADMIN — METHOCARBAMOL 750 MG: 750 TABLET ORAL at 08:11

## 2021-11-10 LAB
ALBUMIN SERPL BCP-MCNC: 2.4 G/DL (ref 3.5–5.2)
ALP SERPL-CCNC: 54 U/L (ref 55–135)
ALT SERPL W/O P-5'-P-CCNC: <5 U/L (ref 10–44)
ANION GAP SERPL CALC-SCNC: 11 MMOL/L (ref 8–16)
AST SERPL-CCNC: 30 U/L (ref 10–40)
BASOPHILS # BLD AUTO: 0.01 K/UL (ref 0–0.2)
BASOPHILS NFR BLD: 0.3 % (ref 0–1.9)
BILIRUB SERPL-MCNC: 0.7 MG/DL (ref 0.1–1)
BLD PROD TYP BPU: NORMAL
BLD PROD TYP BPU: NORMAL
BLOOD UNIT EXPIRATION DATE: NORMAL
BLOOD UNIT EXPIRATION DATE: NORMAL
BLOOD UNIT TYPE CODE: 9500
BLOOD UNIT TYPE CODE: 9500
BLOOD UNIT TYPE: NORMAL
BLOOD UNIT TYPE: NORMAL
BUN SERPL-MCNC: 31 MG/DL (ref 8–23)
CALCIUM SERPL-MCNC: 8.1 MG/DL (ref 8.7–10.5)
CHLORIDE SERPL-SCNC: 98 MMOL/L (ref 95–110)
CO2 SERPL-SCNC: 28 MMOL/L (ref 23–29)
CODING SYSTEM: NORMAL
CODING SYSTEM: NORMAL
CREAT SERPL-MCNC: 7.4 MG/DL (ref 0.5–1.4)
DIFFERENTIAL METHOD: ABNORMAL
DISPENSE STATUS: NORMAL
DISPENSE STATUS: NORMAL
EOSINOPHIL # BLD AUTO: 0 K/UL (ref 0–0.5)
EOSINOPHIL NFR BLD: 1.1 % (ref 0–8)
ERYTHROCYTE [DISTWIDTH] IN BLOOD BY AUTOMATED COUNT: 16.5 % (ref 11.5–14.5)
EST. GFR  (AFRICAN AMERICAN): 8.1 ML/MIN/1.73 M^2
EST. GFR  (NON AFRICAN AMERICAN): 7 ML/MIN/1.73 M^2
FERRITIN SERPL-MCNC: 1276 NG/ML (ref 20–300)
GLUCOSE SERPL-MCNC: 87 MG/DL (ref 70–110)
HCT VFR BLD AUTO: 24.6 % (ref 40–54)
HGB BLD-MCNC: 8.1 G/DL (ref 14–18)
IMM GRANULOCYTES # BLD AUTO: 0.02 K/UL (ref 0–0.04)
IMM GRANULOCYTES NFR BLD AUTO: 0.5 % (ref 0–0.5)
LYMPHOCYTES # BLD AUTO: 0.4 K/UL (ref 1–4.8)
LYMPHOCYTES NFR BLD: 11.8 % (ref 18–48)
MCH RBC QN AUTO: 34.2 PG (ref 27–31)
MCHC RBC AUTO-ENTMCNC: 32.9 G/DL (ref 32–36)
MCV RBC AUTO: 104 FL (ref 82–98)
MONOCYTES # BLD AUTO: 0.3 K/UL (ref 0.3–1)
MONOCYTES NFR BLD: 7.5 % (ref 4–15)
NEUTROPHILS # BLD AUTO: 2.9 K/UL (ref 1.8–7.7)
NEUTROPHILS NFR BLD: 78.8 % (ref 38–73)
NRBC BLD-RTO: 0 /100 WBC
PLATELET # BLD AUTO: 69 K/UL (ref 150–450)
PMV BLD AUTO: 11.8 FL (ref 9.2–12.9)
POCT GLUCOSE: 108 MG/DL (ref 70–110)
POCT GLUCOSE: 87 MG/DL (ref 70–110)
POTASSIUM SERPL-SCNC: 3.3 MMOL/L (ref 3.5–5.1)
PROT SERPL-MCNC: 5.1 G/DL (ref 6–8.4)
RBC # BLD AUTO: 2.37 M/UL (ref 4.6–6.2)
SODIUM SERPL-SCNC: 137 MMOL/L (ref 136–145)
TRANS ERYTHROCYTES VOL PATIENT: NORMAL ML
TRANS ERYTHROCYTES VOL PATIENT: NORMAL ML
WBC # BLD AUTO: 3.73 K/UL (ref 3.9–12.7)

## 2021-11-10 PROCEDURE — 11000001 HC ACUTE MED/SURG PRIVATE ROOM

## 2021-11-10 PROCEDURE — 63600175 PHARM REV CODE 636 W HCPCS: Performed by: STUDENT IN AN ORGANIZED HEALTH CARE EDUCATION/TRAINING PROGRAM

## 2021-11-10 PROCEDURE — 80053 COMPREHEN METABOLIC PANEL: CPT | Performed by: STUDENT IN AN ORGANIZED HEALTH CARE EDUCATION/TRAINING PROGRAM

## 2021-11-10 PROCEDURE — 63600175 PHARM REV CODE 636 W HCPCS: Mod: JG | Performed by: STUDENT IN AN ORGANIZED HEALTH CARE EDUCATION/TRAINING PROGRAM

## 2021-11-10 PROCEDURE — 97530 THERAPEUTIC ACTIVITIES: CPT

## 2021-11-10 PROCEDURE — 25000003 PHARM REV CODE 250: Performed by: STUDENT IN AN ORGANIZED HEALTH CARE EDUCATION/TRAINING PROGRAM

## 2021-11-10 PROCEDURE — 97535 SELF CARE MNGMENT TRAINING: CPT

## 2021-11-10 PROCEDURE — 99233 PR SUBSEQUENT HOSPITAL CARE,LEVL III: ICD-10-PCS | Mod: GC,,, | Performed by: INTERNAL MEDICINE

## 2021-11-10 PROCEDURE — 80100016 HC MAINTENANCE HEMODIALYSIS

## 2021-11-10 PROCEDURE — 99233 SBSQ HOSP IP/OBS HIGH 50: CPT | Mod: GC,,, | Performed by: HOSPITALIST

## 2021-11-10 PROCEDURE — 99233 PR SUBSEQUENT HOSPITAL CARE,LEVL III: ICD-10-PCS | Mod: GC,,, | Performed by: HOSPITALIST

## 2021-11-10 PROCEDURE — 85025 COMPLETE CBC W/AUTO DIFF WBC: CPT | Performed by: STUDENT IN AN ORGANIZED HEALTH CARE EDUCATION/TRAINING PROGRAM

## 2021-11-10 PROCEDURE — 99233 SBSQ HOSP IP/OBS HIGH 50: CPT | Mod: GC,,, | Performed by: INTERNAL MEDICINE

## 2021-11-10 PROCEDURE — 36415 COLL VENOUS BLD VENIPUNCTURE: CPT | Performed by: STUDENT IN AN ORGANIZED HEALTH CARE EDUCATION/TRAINING PROGRAM

## 2021-11-10 RX ORDER — FOLIC ACID 1 MG/1
1 TABLET ORAL DAILY
Status: DISCONTINUED | OUTPATIENT
Start: 2021-11-10 | End: 2021-11-12 | Stop reason: HOSPADM

## 2021-11-10 RX ORDER — TALC
6 POWDER (GRAM) TOPICAL NIGHTLY PRN
Status: DISCONTINUED | OUTPATIENT
Start: 2021-11-10 | End: 2021-11-12 | Stop reason: HOSPADM

## 2021-11-10 RX ADMIN — SENNOSIDES 8.6 MG: 8.6 TABLET, COATED ORAL at 01:11

## 2021-11-10 RX ADMIN — METHOCARBAMOL 750 MG: 750 TABLET ORAL at 01:11

## 2021-11-10 RX ADMIN — POLYETHYLENE GLYCOL 3350 17 G: 17 POWDER, FOR SOLUTION ORAL at 08:11

## 2021-11-10 RX ADMIN — AMLODIPINE BESYLATE 10 MG: 10 TABLET ORAL at 01:11

## 2021-11-10 RX ADMIN — MUPIROCIN: 20 OINTMENT TOPICAL at 09:11

## 2021-11-10 RX ADMIN — IRON SUCROSE 300 MG: 20 INJECTION, SOLUTION INTRAVENOUS at 01:11

## 2021-11-10 RX ADMIN — LISINOPRIL 20 MG: 20 TABLET ORAL at 01:11

## 2021-11-10 RX ADMIN — ACETAMINOPHEN 1000 MG: 500 TABLET ORAL at 01:11

## 2021-11-10 RX ADMIN — OXYCODONE HYDROCHLORIDE 5 MG: 10 TABLET ORAL at 08:11

## 2021-11-10 RX ADMIN — APIXABAN 10 MG: 5 TABLET, FILM COATED ORAL at 08:11

## 2021-11-10 RX ADMIN — FOLIC ACID 1 MG: 1 TABLET ORAL at 01:11

## 2021-11-10 RX ADMIN — ATORVASTATIN CALCIUM 40 MG: 20 TABLET, FILM COATED ORAL at 01:11

## 2021-11-10 RX ADMIN — METHOCARBAMOL 750 MG: 750 TABLET ORAL at 08:11

## 2021-11-10 RX ADMIN — ACETAMINOPHEN 1000 MG: 500 TABLET ORAL at 09:11

## 2021-11-10 RX ADMIN — Medication 1 APPLICATOR: at 05:11

## 2021-11-10 RX ADMIN — OXYCODONE HYDROCHLORIDE 10 MG: 10 TABLET ORAL at 08:11

## 2021-11-10 RX ADMIN — APIXABAN 10 MG: 5 TABLET, FILM COATED ORAL at 01:11

## 2021-11-10 RX ADMIN — Medication 1 APPLICATOR: at 11:11

## 2021-11-10 RX ADMIN — POLYETHYLENE GLYCOL 3350 17 G: 17 POWDER, FOR SOLUTION ORAL at 01:11

## 2021-11-10 RX ADMIN — Medication 6 MG: at 08:11

## 2021-11-10 RX ADMIN — CARVEDILOL 6.25 MG: 6.25 TABLET, FILM COATED ORAL at 01:11

## 2021-11-10 RX ADMIN — EPOETIN ALFA-EPBX 4540 UNITS: 2000 INJECTION, SOLUTION INTRAVENOUS; SUBCUTANEOUS at 11:11

## 2021-11-11 LAB
ALBUMIN SERPL BCP-MCNC: 2.4 G/DL (ref 3.5–5.2)
ALP SERPL-CCNC: 58 U/L (ref 55–135)
ALT SERPL W/O P-5'-P-CCNC: <5 U/L (ref 10–44)
ANION GAP SERPL CALC-SCNC: 14 MMOL/L (ref 8–16)
AST SERPL-CCNC: 40 U/L (ref 10–40)
BASOPHILS # BLD AUTO: 0.01 K/UL (ref 0–0.2)
BASOPHILS NFR BLD: 0.2 % (ref 0–1.9)
BILIRUB SERPL-MCNC: 0.6 MG/DL (ref 0.1–1)
BUN SERPL-MCNC: 28 MG/DL (ref 8–23)
CALCIUM SERPL-MCNC: 7.8 MG/DL (ref 8.7–10.5)
CHLORIDE SERPL-SCNC: 101 MMOL/L (ref 95–110)
CO2 SERPL-SCNC: 23 MMOL/L (ref 23–29)
CREAT SERPL-MCNC: 5.7 MG/DL (ref 0.5–1.4)
DIFFERENTIAL METHOD: ABNORMAL
EOSINOPHIL # BLD AUTO: 0 K/UL (ref 0–0.5)
EOSINOPHIL NFR BLD: 0.4 % (ref 0–8)
ERYTHROCYTE [DISTWIDTH] IN BLOOD BY AUTOMATED COUNT: 16.4 % (ref 11.5–14.5)
EST. GFR  (AFRICAN AMERICAN): 11.2 ML/MIN/1.73 M^2
EST. GFR  (NON AFRICAN AMERICAN): 9.7 ML/MIN/1.73 M^2
GLUCOSE SERPL-MCNC: 62 MG/DL (ref 70–110)
HCT VFR BLD AUTO: 23.9 % (ref 40–54)
HGB BLD-MCNC: 7.8 G/DL (ref 14–18)
IMM GRANULOCYTES # BLD AUTO: 0.03 K/UL (ref 0–0.04)
IMM GRANULOCYTES NFR BLD AUTO: 0.7 % (ref 0–0.5)
LYMPHOCYTES # BLD AUTO: 0.4 K/UL (ref 1–4.8)
LYMPHOCYTES NFR BLD: 8.9 % (ref 18–48)
MCH RBC QN AUTO: 33.8 PG (ref 27–31)
MCHC RBC AUTO-ENTMCNC: 32.6 G/DL (ref 32–36)
MCV RBC AUTO: 104 FL (ref 82–98)
MONOCYTES # BLD AUTO: 0.4 K/UL (ref 0.3–1)
MONOCYTES NFR BLD: 8.7 % (ref 4–15)
NEUTROPHILS # BLD AUTO: 3.6 K/UL (ref 1.8–7.7)
NEUTROPHILS NFR BLD: 81.1 % (ref 38–73)
NRBC BLD-RTO: 0 /100 WBC
PATH REV BLD -IMP: NORMAL
PATH REV BLD -IMP: NORMAL
PLATELET # BLD AUTO: 83 K/UL (ref 150–450)
PMV BLD AUTO: 11.8 FL (ref 9.2–12.9)
POCT GLUCOSE: 104 MG/DL (ref 70–110)
POCT GLUCOSE: 303 MG/DL (ref 70–110)
POCT GLUCOSE: 76 MG/DL (ref 70–110)
POCT GLUCOSE: 78 MG/DL (ref 70–110)
POCT GLUCOSE: 88 MG/DL (ref 70–110)
POTASSIUM SERPL-SCNC: 3.7 MMOL/L (ref 3.5–5.1)
PROT SERPL-MCNC: 4.8 G/DL (ref 6–8.4)
RBC # BLD AUTO: 2.31 M/UL (ref 4.6–6.2)
SARS-COV-2 RNA RESP QL NAA+PROBE: NOT DETECTED
SODIUM SERPL-SCNC: 138 MMOL/L (ref 136–145)
WBC # BLD AUTO: 4.47 K/UL (ref 3.9–12.7)

## 2021-11-11 PROCEDURE — 99233 SBSQ HOSP IP/OBS HIGH 50: CPT | Mod: GC,,, | Performed by: HOSPITALIST

## 2021-11-11 PROCEDURE — 80100014 HC HEMODIALYSIS 1:1

## 2021-11-11 PROCEDURE — 25000003 PHARM REV CODE 250: Performed by: STUDENT IN AN ORGANIZED HEALTH CARE EDUCATION/TRAINING PROGRAM

## 2021-11-11 PROCEDURE — U0005 INFEC AGEN DETEC AMPLI PROBE: HCPCS | Performed by: ORTHOPAEDIC SURGERY

## 2021-11-11 PROCEDURE — 11000001 HC ACUTE MED/SURG PRIVATE ROOM

## 2021-11-11 PROCEDURE — 36415 COLL VENOUS BLD VENIPUNCTURE: CPT | Performed by: STUDENT IN AN ORGANIZED HEALTH CARE EDUCATION/TRAINING PROGRAM

## 2021-11-11 PROCEDURE — 97535 SELF CARE MNGMENT TRAINING: CPT

## 2021-11-11 PROCEDURE — 99233 PR SUBSEQUENT HOSPITAL CARE,LEVL III: ICD-10-PCS | Mod: GC,,, | Performed by: HOSPITALIST

## 2021-11-11 PROCEDURE — 80053 COMPREHEN METABOLIC PANEL: CPT | Performed by: STUDENT IN AN ORGANIZED HEALTH CARE EDUCATION/TRAINING PROGRAM

## 2021-11-11 PROCEDURE — 97112 NEUROMUSCULAR REEDUCATION: CPT

## 2021-11-11 PROCEDURE — 97530 THERAPEUTIC ACTIVITIES: CPT

## 2021-11-11 PROCEDURE — U0003 INFECTIOUS AGENT DETECTION BY NUCLEIC ACID (DNA OR RNA); SEVERE ACUTE RESPIRATORY SYNDROME CORONAVIRUS 2 (SARS-COV-2) (CORONAVIRUS DISEASE [COVID-19]), AMPLIFIED PROBE TECHNIQUE, MAKING USE OF HIGH THROUGHPUT TECHNOLOGIES AS DESCRIBED BY CMS-2020-01-R: HCPCS | Performed by: ORTHOPAEDIC SURGERY

## 2021-11-11 PROCEDURE — 85060 BLOOD SMEAR INTERPRETATION: CPT | Mod: ,,, | Performed by: PATHOLOGY

## 2021-11-11 PROCEDURE — 85060 PATHOLOGIST REVIEW: ICD-10-PCS | Mod: ,,, | Performed by: PATHOLOGY

## 2021-11-11 PROCEDURE — 97116 GAIT TRAINING THERAPY: CPT

## 2021-11-11 PROCEDURE — 85025 COMPLETE CBC W/AUTO DIFF WBC: CPT | Performed by: STUDENT IN AN ORGANIZED HEALTH CARE EDUCATION/TRAINING PROGRAM

## 2021-11-11 RX ORDER — SODIUM CHLORIDE 9 MG/ML
INJECTION, SOLUTION INTRAVENOUS ONCE
Status: COMPLETED | OUTPATIENT
Start: 2021-11-11 | End: 2021-11-12

## 2021-11-11 RX ORDER — HEPARIN SODIUM 1000 [USP'U]/ML
1000 INJECTION, SOLUTION INTRAVENOUS; SUBCUTANEOUS
Status: DISCONTINUED | OUTPATIENT
Start: 2021-11-11 | End: 2021-11-12 | Stop reason: HOSPADM

## 2021-11-11 RX ADMIN — MUPIROCIN: 20 OINTMENT TOPICAL at 09:11

## 2021-11-11 RX ADMIN — AMLODIPINE BESYLATE 10 MG: 10 TABLET ORAL at 08:11

## 2021-11-11 RX ADMIN — NEPHROCAP 1 CAPSULE: 1 CAP ORAL at 11:11

## 2021-11-11 RX ADMIN — APIXABAN 10 MG: 5 TABLET, FILM COATED ORAL at 09:11

## 2021-11-11 RX ADMIN — METHOCARBAMOL 750 MG: 750 TABLET ORAL at 08:11

## 2021-11-11 RX ADMIN — APIXABAN 10 MG: 5 TABLET, FILM COATED ORAL at 08:11

## 2021-11-11 RX ADMIN — LISINOPRIL 20 MG: 20 TABLET ORAL at 08:11

## 2021-11-11 RX ADMIN — METHOCARBAMOL 750 MG: 750 TABLET ORAL at 01:11

## 2021-11-11 RX ADMIN — ACETAMINOPHEN 1000 MG: 500 TABLET ORAL at 09:11

## 2021-11-11 RX ADMIN — SODIUM CHLORIDE 100 ML/HR: 0.9 INJECTION, SOLUTION INTRAVENOUS at 11:11

## 2021-11-11 RX ADMIN — OXYCODONE HYDROCHLORIDE 10 MG: 10 TABLET ORAL at 01:11

## 2021-11-11 RX ADMIN — NEPHROCAP 1 CAPSULE: 1 CAP ORAL at 09:11

## 2021-11-11 RX ADMIN — CARVEDILOL 6.25 MG: 6.25 TABLET, FILM COATED ORAL at 08:11

## 2021-11-11 RX ADMIN — POLYETHYLENE GLYCOL 3350 17 G: 17 POWDER, FOR SOLUTION ORAL at 08:11

## 2021-11-11 RX ADMIN — ATORVASTATIN CALCIUM 40 MG: 20 TABLET, FILM COATED ORAL at 08:11

## 2021-11-11 RX ADMIN — METHOCARBAMOL 750 MG: 750 TABLET ORAL at 09:11

## 2021-11-11 RX ADMIN — OXYCODONE HYDROCHLORIDE 5 MG: 10 TABLET ORAL at 06:11

## 2021-11-11 RX ADMIN — SENNOSIDES 8.6 MG: 8.6 TABLET, COATED ORAL at 08:11

## 2021-11-11 RX ADMIN — ACETAMINOPHEN 1000 MG: 500 TABLET ORAL at 08:11

## 2021-11-11 RX ADMIN — ACETAMINOPHEN 1000 MG: 500 TABLET ORAL at 12:11

## 2021-11-11 RX ADMIN — FOLIC ACID 1 MG: 1 TABLET ORAL at 08:11

## 2021-11-11 RX ADMIN — CARVEDILOL 6.25 MG: 6.25 TABLET, FILM COATED ORAL at 05:11

## 2021-11-12 VITALS
BODY MASS INDEX: 29.62 KG/M2 | WEIGHT: 200 LBS | HEIGHT: 69 IN | OXYGEN SATURATION: 95 % | SYSTOLIC BLOOD PRESSURE: 158 MMHG | HEART RATE: 82 BPM | DIASTOLIC BLOOD PRESSURE: 69 MMHG | RESPIRATION RATE: 18 BRPM | TEMPERATURE: 98 F

## 2021-11-12 LAB
ALBUMIN SERPL BCP-MCNC: 2.3 G/DL (ref 3.5–5.2)
ALP SERPL-CCNC: 57 U/L (ref 55–135)
ALT SERPL W/O P-5'-P-CCNC: 6 U/L (ref 10–44)
ANION GAP SERPL CALC-SCNC: 13 MMOL/L (ref 8–16)
AST SERPL-CCNC: 48 U/L (ref 10–40)
BASOPHILS # BLD AUTO: 0.02 K/UL (ref 0–0.2)
BASOPHILS NFR BLD: 0.4 % (ref 0–1.9)
BILIRUB SERPL-MCNC: 0.7 MG/DL (ref 0.1–1)
BUN SERPL-MCNC: 16 MG/DL (ref 8–23)
CALCIUM SERPL-MCNC: 8 MG/DL (ref 8.7–10.5)
CHLORIDE SERPL-SCNC: 102 MMOL/L (ref 95–110)
CO2 SERPL-SCNC: 23 MMOL/L (ref 23–29)
CREAT SERPL-MCNC: 3.3 MG/DL (ref 0.5–1.4)
DIFFERENTIAL METHOD: ABNORMAL
EOSINOPHIL # BLD AUTO: 0.1 K/UL (ref 0–0.5)
EOSINOPHIL NFR BLD: 1.3 % (ref 0–8)
ERYTHROCYTE [DISTWIDTH] IN BLOOD BY AUTOMATED COUNT: 16.1 % (ref 11.5–14.5)
EST. GFR  (AFRICAN AMERICAN): 21.6 ML/MIN/1.73 M^2
EST. GFR  (NON AFRICAN AMERICAN): 18.7 ML/MIN/1.73 M^2
GLUCOSE SERPL-MCNC: 74 MG/DL (ref 70–110)
HCT VFR BLD AUTO: 23.5 % (ref 40–54)
HGB BLD-MCNC: 7.9 G/DL (ref 14–18)
IMM GRANULOCYTES # BLD AUTO: 0.04 K/UL (ref 0–0.04)
IMM GRANULOCYTES NFR BLD AUTO: 0.9 % (ref 0–0.5)
LYMPHOCYTES # BLD AUTO: 0.7 K/UL (ref 1–4.8)
LYMPHOCYTES NFR BLD: 14 % (ref 18–48)
MCH RBC QN AUTO: 34.3 PG (ref 27–31)
MCHC RBC AUTO-ENTMCNC: 33.6 G/DL (ref 32–36)
MCV RBC AUTO: 102 FL (ref 82–98)
MONOCYTES # BLD AUTO: 0.8 K/UL (ref 0.3–1)
MONOCYTES NFR BLD: 16.3 % (ref 4–15)
NEUTROPHILS # BLD AUTO: 3.1 K/UL (ref 1.8–7.7)
NEUTROPHILS NFR BLD: 67.1 % (ref 38–73)
NRBC BLD-RTO: 1 /100 WBC
PLATELET # BLD AUTO: 86 K/UL (ref 150–450)
PMV BLD AUTO: 11.7 FL (ref 9.2–12.9)
POCT GLUCOSE: 80 MG/DL (ref 70–110)
POTASSIUM SERPL-SCNC: 4.2 MMOL/L (ref 3.5–5.1)
PROT SERPL-MCNC: 5.1 G/DL (ref 6–8.4)
RBC # BLD AUTO: 2.3 M/UL (ref 4.6–6.2)
SODIUM SERPL-SCNC: 138 MMOL/L (ref 136–145)
WBC # BLD AUTO: 4.65 K/UL (ref 3.9–12.7)

## 2021-11-12 PROCEDURE — 25000003 PHARM REV CODE 250: Performed by: STUDENT IN AN ORGANIZED HEALTH CARE EDUCATION/TRAINING PROGRAM

## 2021-11-12 PROCEDURE — 80053 COMPREHEN METABOLIC PANEL: CPT | Performed by: STUDENT IN AN ORGANIZED HEALTH CARE EDUCATION/TRAINING PROGRAM

## 2021-11-12 PROCEDURE — 36415 COLL VENOUS BLD VENIPUNCTURE: CPT | Performed by: STUDENT IN AN ORGANIZED HEALTH CARE EDUCATION/TRAINING PROGRAM

## 2021-11-12 PROCEDURE — 85025 COMPLETE CBC W/AUTO DIFF WBC: CPT | Performed by: STUDENT IN AN ORGANIZED HEALTH CARE EDUCATION/TRAINING PROGRAM

## 2021-11-12 RX ORDER — OXYCODONE HYDROCHLORIDE 10 MG/1
10 TABLET ORAL EVERY 6 HOURS PRN
Refills: 0 | Status: ON HOLD
Start: 2021-11-12 | End: 2021-12-02 | Stop reason: HOSPADM

## 2021-11-12 RX ORDER — METHOCARBAMOL 500 MG/1
1000 TABLET, FILM COATED ORAL 3 TIMES DAILY
Qty: 84 TABLET | Refills: 0 | Status: ON HOLD | OUTPATIENT
Start: 2021-11-12 | End: 2021-12-02 | Stop reason: HOSPADM

## 2021-11-12 RX ORDER — FOLIC ACID 1 MG/1
1 TABLET ORAL DAILY
Refills: 0
Start: 2021-11-12 | End: 2022-08-18

## 2021-11-12 RX ORDER — ACETAMINOPHEN 500 MG
1000 TABLET ORAL 3 TIMES DAILY
Refills: 0 | Status: ON HOLD
Start: 2021-11-12 | End: 2021-12-02 | Stop reason: HOSPADM

## 2021-11-12 RX ORDER — CARVEDILOL 6.25 MG/1
6.25 TABLET ORAL 2 TIMES DAILY WITH MEALS
Qty: 60 TABLET | Refills: 11
Start: 2021-11-12 | End: 2023-02-15 | Stop reason: SDUPTHER

## 2021-11-12 RX ADMIN — METHOCARBAMOL 750 MG: 750 TABLET ORAL at 08:11

## 2021-11-12 RX ADMIN — FOLIC ACID 1 MG: 1 TABLET ORAL at 08:11

## 2021-11-12 RX ADMIN — ACETAMINOPHEN 1000 MG: 500 TABLET ORAL at 08:11

## 2021-11-12 RX ADMIN — ATORVASTATIN CALCIUM 40 MG: 20 TABLET, FILM COATED ORAL at 08:11

## 2021-11-12 RX ADMIN — APIXABAN 10 MG: 5 TABLET, FILM COATED ORAL at 08:11

## 2021-11-12 RX ADMIN — CARVEDILOL 6.25 MG: 6.25 TABLET, FILM COATED ORAL at 08:11

## 2021-11-12 RX ADMIN — AMLODIPINE BESYLATE 10 MG: 10 TABLET ORAL at 08:11

## 2021-11-12 RX ADMIN — LISINOPRIL 20 MG: 20 TABLET ORAL at 08:11

## 2021-11-12 RX ADMIN — NEPHROCAP 1 CAPSULE: 1 CAP ORAL at 08:11

## 2021-11-15 PROBLEM — F32.9 REACTIVE DEPRESSION: Status: ACTIVE | Noted: 2021-11-15

## 2021-11-29 PROBLEM — E87.1 HYPONATREMIA: Status: ACTIVE | Noted: 2021-11-29

## 2021-12-02 PROBLEM — E87.1 HYPONATREMIA: Status: RESOLVED | Noted: 2021-11-29 | Resolved: 2021-12-02

## 2021-12-16 ENCOUNTER — HOSPITAL ENCOUNTER (OUTPATIENT)
Dept: RADIOLOGY | Facility: HOSPITAL | Age: 66
Discharge: HOME | End: 2021-12-16
Attending: INTERNAL MEDICINE
Payer: MEDICARE

## 2021-12-16 DIAGNOSIS — R52 PAIN: Primary | ICD-10-CM

## 2021-12-16 DIAGNOSIS — R52 PAIN: ICD-10-CM

## 2021-12-16 PROCEDURE — 93971 EXTREMITY STUDY: CPT | Mod: TC,LT

## 2021-12-20 ENCOUNTER — LAB VISIT (OUTPATIENT)
Dept: LAB | Facility: HOSPITAL | Age: 66
End: 2021-12-20
Attending: INTERNAL MEDICINE
Payer: MEDICARE

## 2021-12-20 DIAGNOSIS — E78.2 MIXED HYPERLIPIDEMIA: ICD-10-CM

## 2021-12-20 DIAGNOSIS — E11.9 DIABETES MELLITUS WITHOUT COMPLICATION: Primary | ICD-10-CM

## 2021-12-20 DIAGNOSIS — D69.6 THROMBOCYTOPENIA, UNSPECIFIED: ICD-10-CM

## 2021-12-20 DIAGNOSIS — D62 ACUTE BLOOD LOSS ANEMIA: ICD-10-CM

## 2021-12-20 LAB
ALBUMIN SERPL BCP-MCNC: 2.7 G/DL (ref 3.5–5.2)
ALBUMIN SERPL BCP-MCNC: 2.7 G/DL (ref 3.5–5.2)
ALP SERPL-CCNC: 132 U/L (ref 55–135)
ALP SERPL-CCNC: 132 U/L (ref 55–135)
ALT SERPL W/O P-5'-P-CCNC: 23 U/L (ref 10–44)
ALT SERPL W/O P-5'-P-CCNC: 23 U/L (ref 10–44)
ANION GAP SERPL CALC-SCNC: 8 MMOL/L (ref 8–16)
AST SERPL-CCNC: 22 U/L (ref 10–40)
AST SERPL-CCNC: 22 U/L (ref 10–40)
BASOPHILS # BLD AUTO: 0.01 K/UL (ref 0–0.2)
BASOPHILS NFR BLD: 0.4 % (ref 0–1.9)
BILIRUB DIRECT SERPL-MCNC: 0.3 MG/DL (ref 0.1–0.3)
BILIRUB SERPL-MCNC: 0.8 MG/DL (ref 0.1–1)
BILIRUB SERPL-MCNC: 0.8 MG/DL (ref 0.1–1)
BUN SERPL-MCNC: 15 MG/DL (ref 8–23)
CALCIUM SERPL-MCNC: 8.7 MG/DL (ref 8.7–10.5)
CHLORIDE SERPL-SCNC: 103 MMOL/L (ref 95–110)
CHOLEST SERPL-MCNC: 80 MG/DL (ref 120–199)
CHOLEST/HDLC SERPL: 2 {RATIO} (ref 2–5)
CO2 SERPL-SCNC: 30 MMOL/L (ref 23–29)
CREAT SERPL-MCNC: 5.9 MG/DL (ref 0.5–1.4)
DIFFERENTIAL METHOD: ABNORMAL
EOSINOPHIL # BLD AUTO: 0 K/UL (ref 0–0.5)
EOSINOPHIL NFR BLD: 1.8 % (ref 0–8)
ERYTHROCYTE [DISTWIDTH] IN BLOOD BY AUTOMATED COUNT: 15.8 % (ref 11.5–14.5)
EST. GFR  (AFRICAN AMERICAN): 10.7 ML/MIN/1.73 M^2
EST. GFR  (NON AFRICAN AMERICAN): 9.3 ML/MIN/1.73 M^2
ESTIMATED AVG GLUCOSE: 94 MG/DL (ref 68–131)
GLUCOSE SERPL-MCNC: 80 MG/DL (ref 70–110)
HBA1C MFR BLD: 4.9 % (ref 4–5.6)
HCT VFR BLD AUTO: 35.7 % (ref 40–54)
HDLC SERPL-MCNC: 40 MG/DL (ref 40–75)
HDLC SERPL: 50 % (ref 20–50)
HGB BLD-MCNC: 11.5 G/DL (ref 14–18)
IMM GRANULOCYTES # BLD AUTO: 0.01 K/UL (ref 0–0.04)
IMM GRANULOCYTES NFR BLD AUTO: 0.4 % (ref 0–0.5)
IRON SERPL-MCNC: 67 UG/DL (ref 45–160)
LDLC SERPL CALC-MCNC: 30 MG/DL (ref 63–159)
LYMPHOCYTES # BLD AUTO: 0.6 K/UL (ref 1–4.8)
LYMPHOCYTES NFR BLD: 25.6 % (ref 18–48)
MCH RBC QN AUTO: 31.4 PG (ref 27–31)
MCHC RBC AUTO-ENTMCNC: 32.2 G/DL (ref 32–36)
MCV RBC AUTO: 98 FL (ref 82–98)
MONOCYTES # BLD AUTO: 0.2 K/UL (ref 0.3–1)
MONOCYTES NFR BLD: 9.7 % (ref 4–15)
NEUTROPHILS # BLD AUTO: 1.4 K/UL (ref 1.8–7.7)
NEUTROPHILS NFR BLD: 62.1 % (ref 38–73)
NONHDLC SERPL-MCNC: 40 MG/DL
NRBC BLD-RTO: 0 /100 WBC
PLATELET # BLD AUTO: 105 K/UL (ref 150–450)
PMV BLD AUTO: 10.2 FL (ref 9.2–12.9)
POTASSIUM SERPL-SCNC: 3.6 MMOL/L (ref 3.5–5.1)
PROT SERPL-MCNC: 6.1 G/DL (ref 6–8.4)
PROT SERPL-MCNC: 6.1 G/DL (ref 6–8.4)
RBC # BLD AUTO: 3.66 M/UL (ref 4.6–6.2)
SODIUM SERPL-SCNC: 141 MMOL/L (ref 136–145)
TRIGL SERPL-MCNC: 50 MG/DL (ref 30–150)
TSH SERPL DL<=0.005 MIU/L-ACNC: 1.73 UIU/ML (ref 0.4–4)
WBC # BLD AUTO: 2.27 K/UL (ref 3.9–12.7)

## 2021-12-20 PROCEDURE — 83540 ASSAY OF IRON: CPT | Performed by: INTERNAL MEDICINE

## 2021-12-20 PROCEDURE — 83036 HEMOGLOBIN GLYCOSYLATED A1C: CPT | Performed by: INTERNAL MEDICINE

## 2021-12-20 PROCEDURE — 85025 COMPLETE CBC W/AUTO DIFF WBC: CPT | Performed by: INTERNAL MEDICINE

## 2021-12-20 PROCEDURE — 84443 ASSAY THYROID STIM HORMONE: CPT | Performed by: INTERNAL MEDICINE

## 2021-12-20 PROCEDURE — 36415 COLL VENOUS BLD VENIPUNCTURE: CPT | Performed by: INTERNAL MEDICINE

## 2021-12-20 PROCEDURE — 80053 COMPREHEN METABOLIC PANEL: CPT | Performed by: INTERNAL MEDICINE

## 2021-12-20 PROCEDURE — 80061 LIPID PANEL: CPT | Performed by: INTERNAL MEDICINE

## 2021-12-30 ENCOUNTER — PATIENT MESSAGE (OUTPATIENT)
Dept: ORTHOPEDICS | Facility: CLINIC | Age: 66
End: 2021-12-30
Payer: MEDICARE

## 2022-01-03 ENCOUNTER — TELEPHONE (OUTPATIENT)
Dept: ORTHOPEDICS | Facility: CLINIC | Age: 67
End: 2022-01-03
Payer: MEDICARE

## 2022-01-03 NOTE — TELEPHONE ENCOUNTER
Returned call to Maria Del Carmen at number provided. No answer and unable to leave VM, phone just kept ringing.    ----- Message from Kimmie Daigle sent at 1/3/2022  8:50 AM CST -----  Regarding: Appt Access  Nursing home facility called and advised pt tested positive for COVID and needs to reschedule his 01/04/22 appts. Requesting a call back to reschedule.      [phone] (Maria Del Carmen)

## 2022-01-06 ENCOUNTER — TELEPHONE (OUTPATIENT)
Dept: ORTHOPEDICS | Facility: CLINIC | Age: 67
End: 2022-01-06
Payer: MEDICARE

## 2022-01-06 NOTE — TELEPHONE ENCOUNTER
----- Message from Brandi Atwood PA-C sent at 1/6/2022 12:51 PM CST -----  Regarding: FW: Appointment Access  Contact: Pt [phone]  Will you please reschedule?    ----- Message -----  From: Savana Elizabeth  Sent: 1/6/2022  11:35 AM CST  To: Rai Paz Staff  Subject: Appointment Access                               Patient is calling Dr. Atwood's office in regards to rescheduling appointment. Please call. [phone]

## 2022-01-07 ENCOUNTER — TELEPHONE (OUTPATIENT)
Dept: ORTHOPEDICS | Facility: CLINIC | Age: 67
End: 2022-01-07
Payer: MEDICARE

## 2022-01-07 NOTE — TELEPHONE ENCOUNTER
Tried returning patients call.  is not set up and unable to leave a message.  ----- Message from Amirah Barriga sent at 1/7/2022 10:37 AM CST -----  Regarding: Call back request  Contact: pt  Pt returning missed call.    Pt @ [phone]

## 2022-01-18 ENCOUNTER — OFFICE VISIT (OUTPATIENT)
Dept: ORTHOPEDICS | Facility: CLINIC | Age: 67
End: 2022-01-18
Payer: MEDICARE

## 2022-01-18 ENCOUNTER — HOSPITAL ENCOUNTER (OUTPATIENT)
Dept: RADIOLOGY | Facility: HOSPITAL | Age: 67
Discharge: HOME | End: 2022-01-18
Attending: ORTHOPAEDIC SURGERY
Payer: MEDICARE

## 2022-01-18 VITALS — BODY MASS INDEX: 37.03 KG/M2 | WEIGHT: 250 LBS | HEIGHT: 69 IN

## 2022-01-18 DIAGNOSIS — Z98.1 S/P SPINAL FUSION: Primary | ICD-10-CM

## 2022-01-18 DIAGNOSIS — Z98.890 S/P SPINAL SURGERY: ICD-10-CM

## 2022-01-18 PROCEDURE — 99024 POSTOP FOLLOW-UP VISIT: CPT | Mod: POP,,, | Performed by: PHYSICIAN ASSISTANT

## 2022-01-18 PROCEDURE — 99024 PR POST-OP FOLLOW-UP VISIT: ICD-10-PCS | Mod: POP,,, | Performed by: PHYSICIAN ASSISTANT

## 2022-01-18 PROCEDURE — 99999 PR PBB SHADOW E&M-EST. PATIENT-LVL III: CPT | Mod: PBBFAC,,, | Performed by: PHYSICIAN ASSISTANT

## 2022-01-18 PROCEDURE — 72080 X-RAY EXAM THORACOLMB 2/> VW: CPT | Mod: TC

## 2022-01-18 PROCEDURE — 72080 X-RAY EXAM THORACOLMB 2/> VW: CPT | Mod: 26,,, | Performed by: RADIOLOGY

## 2022-01-18 PROCEDURE — 99213 OFFICE O/P EST LOW 20 MIN: CPT | Mod: PBBFAC | Performed by: PHYSICIAN ASSISTANT

## 2022-01-18 PROCEDURE — 99999 PR PBB SHADOW E&M-EST. PATIENT-LVL III: ICD-10-PCS | Mod: PBBFAC,,, | Performed by: PHYSICIAN ASSISTANT

## 2022-01-18 PROCEDURE — 72080 XR THORACOLUMBAR SPINE AP LATERAL: ICD-10-PCS | Mod: 26,,, | Performed by: RADIOLOGY

## 2022-01-18 NOTE — PROGRESS NOTES
Date: 01/18/2022    Supervising Physician: Vadim Lim M.D.    Date of Surgery: 11/7/2021    Procedure: T10-L2 fusion, T11 and T12 laminectomy for decompression of epidural hematoma, ORIF T12 fracture    History: Kevin Sanon is seen today for follow-up following the above listed procedure. Overall the patient is doing well but today notes his pain is significantly improved compared to before surgery.  He is staying at a nursing home and working with physical therapy.   Pain is well controlled with current pain medication.  he denies fever, chills, and sweats since the time of the surgery.  He is wearing a TLSO today.  He is ambulating with a walker in rehab.  He actually used a cane this morning.  He is in a wheelchair in clinic.       Exam: Incision is healing well.   There is no sign of infection. Neuro exam is stable. No signs of DVT.    Radiographs: imaging today shows hardware in place, no evidence of failure.     Assessment/Plan: 10 weeks post op.    Doing well postoperatively.  Continue PT/OT.  Continue TLSO for another month.    I will plan to see the patient back for the next postop visit in 2 months with imaging.     Thank you for the opportunity to participate in this patient's care. Please give me a call if there are any concerns or questions.

## 2022-03-14 ENCOUNTER — HOSPITAL ENCOUNTER (EMERGENCY)
Facility: HOSPITAL | Age: 67
Discharge: HOME | End: 2022-03-14
Attending: EMERGENCY MEDICINE
Payer: MEDICARE

## 2022-03-14 VITALS
BODY MASS INDEX: 36.92 KG/M2 | HEART RATE: 75 BPM | TEMPERATURE: 98 F | OXYGEN SATURATION: 99 % | DIASTOLIC BLOOD PRESSURE: 87 MMHG | RESPIRATION RATE: 18 BRPM | HEIGHT: 69 IN | SYSTOLIC BLOOD PRESSURE: 219 MMHG

## 2022-03-14 DIAGNOSIS — N18.6 ESRD (END STAGE RENAL DISEASE) ON DIALYSIS: Primary | ICD-10-CM

## 2022-03-14 DIAGNOSIS — Z99.2 ESRD (END STAGE RENAL DISEASE) ON DIALYSIS: Primary | ICD-10-CM

## 2022-03-14 DIAGNOSIS — R55 NEAR SYNCOPE: ICD-10-CM

## 2022-03-14 LAB
ALBUMIN SERPL BCP-MCNC: 3 G/DL (ref 3.5–5.2)
ALP SERPL-CCNC: 86 U/L (ref 55–135)
ALT SERPL W/O P-5'-P-CCNC: 18 U/L (ref 10–44)
ANION GAP SERPL CALC-SCNC: 5 MMOL/L (ref 8–16)
AST SERPL-CCNC: 15 U/L (ref 10–40)
BASOPHILS # BLD AUTO: 0.01 K/UL (ref 0–0.2)
BASOPHILS NFR BLD: 0.3 % (ref 0–1.9)
BILIRUB SERPL-MCNC: 0.7 MG/DL (ref 0.1–1)
BUN SERPL-MCNC: 20 MG/DL (ref 8–23)
CALCIUM SERPL-MCNC: 9.2 MG/DL (ref 8.7–10.5)
CHLORIDE SERPL-SCNC: 103 MMOL/L (ref 95–110)
CO2 SERPL-SCNC: 31 MMOL/L (ref 23–29)
CREAT SERPL-MCNC: 7.9 MG/DL (ref 0.5–1.4)
DIFFERENTIAL METHOD: ABNORMAL
EOSINOPHIL # BLD AUTO: 0.1 K/UL (ref 0–0.5)
EOSINOPHIL NFR BLD: 1.5 % (ref 0–8)
ERYTHROCYTE [DISTWIDTH] IN BLOOD BY AUTOMATED COUNT: 14.1 % (ref 11.5–14.5)
EST. GFR  (AFRICAN AMERICAN): 7.5 ML/MIN/1.73 M^2
EST. GFR  (NON AFRICAN AMERICAN): 6.5 ML/MIN/1.73 M^2
GLUCOSE SERPL-MCNC: 79 MG/DL (ref 70–110)
HCT VFR BLD AUTO: 41 % (ref 40–54)
HGB BLD-MCNC: 13.5 G/DL (ref 14–18)
IMM GRANULOCYTES # BLD AUTO: 0.01 K/UL (ref 0–0.04)
IMM GRANULOCYTES NFR BLD AUTO: 0.3 % (ref 0–0.5)
LYMPHOCYTES # BLD AUTO: 0.9 K/UL (ref 1–4.8)
LYMPHOCYTES NFR BLD: 28.1 % (ref 18–48)
MCH RBC QN AUTO: 31 PG (ref 27–31)
MCHC RBC AUTO-ENTMCNC: 32.9 G/DL (ref 32–36)
MCV RBC AUTO: 94 FL (ref 82–98)
MONOCYTES # BLD AUTO: 0.4 K/UL (ref 0.3–1)
MONOCYTES NFR BLD: 11 % (ref 4–15)
NEUTROPHILS # BLD AUTO: 1.9 K/UL (ref 1.8–7.7)
NEUTROPHILS NFR BLD: 58.8 % (ref 38–73)
NRBC BLD-RTO: 0 /100 WBC
PLATELET # BLD AUTO: 65 K/UL (ref 150–450)
PMV BLD AUTO: 10.1 FL (ref 9.2–12.9)
POTASSIUM SERPL-SCNC: 4.2 MMOL/L (ref 3.5–5.1)
PROT SERPL-MCNC: 6.5 G/DL (ref 6–8.4)
RBC # BLD AUTO: 4.36 M/UL (ref 4.6–6.2)
SODIUM SERPL-SCNC: 139 MMOL/L (ref 136–145)
WBC # BLD AUTO: 3.27 K/UL (ref 3.9–12.7)

## 2022-03-14 PROCEDURE — 80053 COMPREHEN METABOLIC PANEL: CPT | Performed by: EMERGENCY MEDICINE

## 2022-03-14 PROCEDURE — 85025 COMPLETE CBC W/AUTO DIFF WBC: CPT | Performed by: EMERGENCY MEDICINE

## 2022-03-14 PROCEDURE — 93005 ELECTROCARDIOGRAM TRACING: CPT

## 2022-03-14 PROCEDURE — 99284 EMERGENCY DEPT VISIT MOD MDM: CPT

## 2022-03-14 PROCEDURE — 25000003 PHARM REV CODE 250: Performed by: EMERGENCY MEDICINE

## 2022-03-14 PROCEDURE — 93010 ELECTROCARDIOGRAM REPORT: CPT | Mod: ,,, | Performed by: INTERNAL MEDICINE

## 2022-03-14 PROCEDURE — 36415 COLL VENOUS BLD VENIPUNCTURE: CPT | Performed by: EMERGENCY MEDICINE

## 2022-03-14 PROCEDURE — 93010 EKG 12-LEAD: ICD-10-PCS | Mod: ,,, | Performed by: INTERNAL MEDICINE

## 2022-03-14 RX ORDER — AMLODIPINE BESYLATE 10 MG/1
10 TABLET ORAL
Status: COMPLETED | OUTPATIENT
Start: 2022-03-14 | End: 2022-03-14

## 2022-03-14 RX ORDER — AMLODIPINE BESYLATE 10 MG/1
10 TABLET ORAL DAILY
Qty: 30 TABLET | Refills: 0 | Status: SHIPPED | OUTPATIENT
Start: 2022-03-14 | End: 2023-02-15 | Stop reason: SDUPTHER

## 2022-03-14 RX ADMIN — AMLODIPINE BESYLATE 10 MG: 10 TABLET ORAL at 01:03
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
## 2022-04-21 ENCOUNTER — TELEPHONE (OUTPATIENT)
Dept: ORTHOPEDICS | Facility: CLINIC | Age: 67
End: 2022-04-21
Payer: MEDICARE

## 2022-04-21 NOTE — TELEPHONE ENCOUNTER
----- Message from Carmen Licona sent at 4/21/2022  1:25 PM CDT -----  Contact: pt  Pt requesting call back, pt needs pain medication for his back         Confirmed patient's contact info below:  Contact Name: Kevin Sanon  Phone Number: [phone]

## 2022-04-22 RX ORDER — HYDROCODONE BITARTRATE AND ACETAMINOPHEN 5; 325 MG/1; MG/1
1 TABLET ORAL EVERY 6 HOURS PRN
Qty: 28 TABLET | Refills: 0 | Status: SHIPPED | OUTPATIENT
Start: 2022-04-22 | End: 2022-04-29

## 2022-05-17 ENCOUNTER — OFFICE VISIT (OUTPATIENT)
Dept: ORTHOPEDICS | Facility: CLINIC | Age: 67
End: 2022-05-17
Payer: MEDICARE

## 2022-05-17 VITALS — WEIGHT: 155 LBS | HEIGHT: 69 IN | BODY MASS INDEX: 22.96 KG/M2

## 2022-05-17 DIAGNOSIS — Z98.1 S/P SPINAL FUSION: Primary | ICD-10-CM

## 2022-05-17 PROCEDURE — 99213 OFFICE O/P EST LOW 20 MIN: CPT | Mod: PBBFAC | Performed by: PHYSICIAN ASSISTANT

## 2022-05-17 PROCEDURE — 99999 PR PBB SHADOW E&M-EST. PATIENT-LVL III: ICD-10-PCS | Mod: PBBFAC,,, | Performed by: PHYSICIAN ASSISTANT

## 2022-05-17 PROCEDURE — 99999 PR PBB SHADOW E&M-EST. PATIENT-LVL III: CPT | Mod: PBBFAC,,, | Performed by: PHYSICIAN ASSISTANT

## 2022-05-17 PROCEDURE — 99213 OFFICE O/P EST LOW 20 MIN: CPT | Mod: S$PBB,,, | Performed by: PHYSICIAN ASSISTANT

## 2022-05-17 PROCEDURE — 99213 PR OFFICE/OUTPT VISIT, EST, LEVL III, 20-29 MIN: ICD-10-PCS | Mod: S$PBB,,, | Performed by: PHYSICIAN ASSISTANT

## 2022-05-17 RX ORDER — METHOCARBAMOL 750 MG/1
750 TABLET, FILM COATED ORAL 3 TIMES DAILY
Qty: 60 TABLET | Refills: 0 | Status: SHIPPED | OUTPATIENT
Start: 2022-05-17 | End: 2022-06-06

## 2022-05-17 NOTE — PROGRESS NOTES
Date: 05/17/2022    Supervising Physician: Vadim Lim M.D.    Date of Surgery: 11/7/2021    Procedure: T10-L2 fusion, T11 and T12 laminectomy for decompression of epidural hematoma, ORIF T12 fracture    History: Kevin Sanon is seen today for follow-up following the above listed procedure. Overall the patient is doing well but today notes his pain is significantly improved compared to before surgery.  He is ambulating with a cane today.  He is pleased.  He does have occasional muscle spasms at night.       Exam: Incision is healed.   There is no sign of infection. Neuro exam is stable. No signs of DVT.    Radiographs: imaging today shows hardware in place, no evidence of failure.     Assessment/Plan: 6 months post op.    Doing well postoperatively.  He may progress activities as tolerated.     I will plan to see the patient back for the next postop visit at a year from surgery.     Thank you for the opportunity to participate in this patient's care. Please give me a call if there are any concerns or questions.

## 2022-05-24 ENCOUNTER — HOSPITAL ENCOUNTER (EMERGENCY)
Facility: HOSPITAL | Age: 67
Discharge: HOME | End: 2022-05-24
Attending: EMERGENCY MEDICINE
Payer: MEDICARE

## 2022-05-24 VITALS
TEMPERATURE: 98 F | DIASTOLIC BLOOD PRESSURE: 75 MMHG | BODY MASS INDEX: 23.7 KG/M2 | OXYGEN SATURATION: 100 % | HEIGHT: 69 IN | WEIGHT: 160 LBS | RESPIRATION RATE: 16 BRPM | SYSTOLIC BLOOD PRESSURE: 149 MMHG | HEART RATE: 71 BPM

## 2022-05-24 DIAGNOSIS — B02.8 HERPES ZOSTER WITH COMPLICATION: Primary | ICD-10-CM

## 2022-05-24 PROCEDURE — 99284 EMERGENCY DEPT VISIT MOD MDM: CPT

## 2022-05-24 PROCEDURE — 25000003 PHARM REV CODE 250: Performed by: NURSE PRACTITIONER

## 2022-05-24 RX ORDER — FERRIC OXIDE RED, ZINC OXIDE, AND PRAMOXINE HYDROCHLORIDE 1.36; 78.65; 1 MG/ML; MG/ML; MG/ML
LOTION TOPICAL 2 TIMES DAILY PRN
Qty: 177 ML | Refills: 1 | Status: SHIPPED | OUTPATIENT
Start: 2022-05-24 | End: 2022-06-03

## 2022-05-24 RX ORDER — VALACYCLOVIR HYDROCHLORIDE 1 G/1
1000 TABLET, FILM COATED ORAL 3 TIMES DAILY
Qty: 21 TABLET | Refills: 0 | Status: SHIPPED | OUTPATIENT
Start: 2022-05-24 | End: 2022-08-18

## 2022-05-24 RX ORDER — HYDROCODONE BITARTRATE AND ACETAMINOPHEN 5; 325 MG/1; MG/1
1 TABLET ORAL EVERY 8 HOURS PRN
Qty: 15 TABLET | Refills: 0 | Status: SHIPPED | OUTPATIENT
Start: 2022-05-24 | End: 2022-05-29

## 2022-05-24 RX ORDER — VALACYCLOVIR HYDROCHLORIDE 500 MG/1
1000 TABLET, FILM COATED ORAL 2 TIMES DAILY
Status: DISCONTINUED | OUTPATIENT
Start: 2022-05-24 | End: 2022-05-24

## 2022-05-24 RX ORDER — HYDROCODONE BITARTRATE AND ACETAMINOPHEN 5; 325 MG/1; MG/1
1 TABLET ORAL
Status: COMPLETED | OUTPATIENT
Start: 2022-05-24 | End: 2022-05-24

## 2022-05-24 RX ORDER — VALACYCLOVIR HYDROCHLORIDE 500 MG/1
1000 TABLET, FILM COATED ORAL
Status: COMPLETED | OUTPATIENT
Start: 2022-05-24 | End: 2022-05-24

## 2022-05-24 RX ADMIN — VALACYCLOVIR HYDROCHLORIDE 1000 MG: 500 TABLET, FILM COATED ORAL at 06:05

## 2022-05-24 RX ADMIN — HYDROCODONE BITARTRATE AND ACETAMINOPHEN 1 TABLET: 5; 325 TABLET ORAL at 06:05

## 2022-05-24 NOTE — DISCHARGE INSTRUCTIONS
Take medications as directed. Follow up with you primary doctor in 2 days for further evaluation. Return to the ED for worsening symptoms.

## 2022-05-24 NOTE — ED PROVIDER NOTES
Encounter Date: 5/24/2022       History     Chief Complaint   Patient presents with    Rash     2 blisters located to left side of lower back. Reports itching and redness. Started and noticed today.      This is a 67-year-old black male with multiple medical problems including end-stage renal disease on hemodialysis, hypertension, and diabetes mellitus type 2 who presents to the emergency department complaints of painful rash to left lower back that began today.  Patient reports experiencing left lower back achiness, itching, and burning in subsequently developed a blister rash during the day.  Patient reports rash is itching, burning, and very tender to touch.  Patient denies recent or current illness, however states that he recently received his COVID booster injection.  Patient denies known fever, chills, nausea/vomiting, or any other relative symptoms at this time.        Review of patient's allergies indicates:  No Known Allergies  Past Medical History:   Diagnosis Date    Anemia     COPD (chronic obstructive pulmonary disease)     Decreased platelet count     Diabetes mellitus     Dialysis patient     LAST 11/02/2021 MWF    Dialysis patient     last 11/02/2021   every MWF    Dysphagia 10/2021    Hypertension     Insomnia     Kidney disease     PAD (peripheral artery disease)     Weight loss 10/2021     Past Surgical History:   Procedure Laterality Date    BACK SURGERY      KNEE SURGERY      PLACEMENT OF ARTERIOVENOUS GRAFT Left 2/10/2020    Procedure: INSERTION, GRAFT, ARTERIOVENOUS;  Surgeon: Artie Joshi MD;  Location: [facility] OR;  Service: Cardiovascular;  Laterality: Left;    SPINAL FUSION N/A 11/7/2021    Procedure: FUSION, SPINE;  Surgeon: Vadim Lim MD;  Location: [address];  Service: Orthopedics;  Laterality: N/A;  ORIF T12 Fracture with T10-L2 Fusion, T11 and T12 laminectomy  Arian  SNS: [employer]  New mali + pads  Craniotome and M8     Family History   Problem  Relation Age of Onset    Diabetes Mother 83    No Known Problems Father     Kidney disease Sister     Diabetes Brother      Social History     Tobacco Use    Smoking status: Former Smoker     Packs/day: 1.00     Years: 20.00     Pack years: 20.00     Types: Cigars, Cigarettes    Smokeless tobacco: Never Used    Tobacco comment: Also reports 5-6 yrs of smoking cigars   Substance Use Topics    Alcohol use: No    Drug use: No     Review of Systems   Constitutional: Positive for fatigue. Negative for appetite change.   Respiratory: Negative.    Cardiovascular: Negative.    Musculoskeletal: Positive for back pain.   Skin: Positive for rash.       Physical Exam     Initial Vitals [05/24/22 1712]   BP Pulse Resp Temp SpO2   (!) 154/72 69 16 97.7 °F (36.5 °C) 100 %      MAP       --         Physical Exam    Nursing note and vitals reviewed.  Constitutional: He appears well-developed and well-nourished. He is active. No distress.   HENT:   Head: Normocephalic and atraumatic.   Eyes: EOM are normal. Pupils are equal, round, and reactive to light.   Neck: Neck supple.   Normal range of motion.  Cardiovascular: Normal rate.   Pulmonary/Chest: No respiratory distress.   Musculoskeletal:      Cervical back: Normal range of motion and neck supple.     Neurological: He is alert and oriented to person, place, and time. GCS score is 15. GCS eye subscore is 4. GCS verbal subscore is 5. GCS motor subscore is 6.   Skin: Skin is warm and dry. Capillary refill takes less than 2 seconds.   There are two confluent lesions on the left lower lateral back with blistering on macular base with tenderness to palpation. Consistent with zosteriform rash   Psychiatric: He has a normal mood and affect. His behavior is normal. Thought content normal.         ED Course   Procedures  Labs Reviewed - No data to display       Imaging Results    None          Medications   HYDROcodone-acetaminophen 5-325 mg per tablet 1 tablet (1 tablet Oral Given  5/24/22 1841)   valACYclovir tablet 1,000 mg (1,000 mg Oral Given 5/24/22 1854)                          Clinical Impression:   Final diagnoses:  [B02.8] Herpes zoster with complication (Primary)          ED Disposition Condition    Discharge Stable        ED Prescriptions     Medication Sig Dispense Start Date End Date Auth. Provider    valACYclovir (VALTREX) 1000 MG tablet Take 1 tablet (1,000 mg total) by mouth 3 (three) times daily. for 7 days 21 tablet 5/24/2022 5/31/2022 Dulce Maria Dickey NP    HYDROcodone-acetaminophen (NORCO) 5-325 mg per tablet Take 1 tablet by mouth every 8 (eight) hours as needed for Pain. 15 tablet 5/24/2022 5/29/2022 Dulce Maria Dickey NP    diphenhydramine-calamine (CALADRYL) 1-8 % Lotn Apply topically 2 (two) times daily as needed (rash pain). 177 mL 5/24/2022 6/3/2022 Dulce Maria Dickey NP        Follow-up Information     Follow up With Specialties Details Why Contact Info    Emmy Gaines NP Family Medicine Schedule an appointment as soon as possible for a visit in 2 days for re-evaluation of today's complaint [address] [place] [address]  [phone]             Dulce Maria Dickey NP  05/24/22 0954

## 2022-05-26 ENCOUNTER — HOSPITAL ENCOUNTER (EMERGENCY)
Facility: HOSPITAL | Age: 67
Discharge: HOME | End: 2022-05-26
Attending: EMERGENCY MEDICINE
Payer: MEDICARE

## 2022-05-26 VITALS
HEIGHT: 69 IN | TEMPERATURE: 98 F | BODY MASS INDEX: 22.96 KG/M2 | DIASTOLIC BLOOD PRESSURE: 71 MMHG | SYSTOLIC BLOOD PRESSURE: 148 MMHG | WEIGHT: 155 LBS | RESPIRATION RATE: 11 BRPM | HEART RATE: 75 BPM | OXYGEN SATURATION: 100 %

## 2022-05-26 DIAGNOSIS — R53.1 WEAKNESS: Primary | ICD-10-CM

## 2022-05-26 LAB
ALBUMIN SERPL BCP-MCNC: 3.6 G/DL (ref 3.5–5.2)
ALP SERPL-CCNC: 87 U/L (ref 55–135)
ALT SERPL W/O P-5'-P-CCNC: 18 U/L (ref 10–44)
AMPHET+METHAMPHET UR QL: NEGATIVE
ANION GAP SERPL CALC-SCNC: 4 MMOL/L (ref 8–16)
AST SERPL-CCNC: 17 U/L (ref 10–40)
BACTERIA #/AREA URNS HPF: NEGATIVE /HPF
BARBITURATES UR QL SCN>200 NG/ML: NEGATIVE
BASOPHILS # BLD AUTO: 0.01 K/UL (ref 0–0.2)
BASOPHILS NFR BLD: 0.3 % (ref 0–1.9)
BENZODIAZ UR QL SCN>200 NG/ML: NEGATIVE
BILIRUB SERPL-MCNC: 0.9 MG/DL (ref 0.1–1)
BILIRUB UR QL STRIP: NEGATIVE
BUN SERPL-MCNC: 21 MG/DL (ref 8–23)
BZE UR QL SCN: NEGATIVE
CALCIUM SERPL-MCNC: 9.5 MG/DL (ref 8.7–10.5)
CANNABINOIDS UR QL SCN: NEGATIVE
CHLORIDE SERPL-SCNC: 101 MMOL/L (ref 95–110)
CLARITY UR: CLEAR
CO2 SERPL-SCNC: 31 MMOL/L (ref 23–29)
COLOR UR: YELLOW
CREAT SERPL-MCNC: 7.1 MG/DL (ref 0.5–1.4)
CREAT UR-MCNC: 28.1 MG/DL (ref 23–375)
DIFFERENTIAL METHOD: ABNORMAL
EOSINOPHIL # BLD AUTO: 0 K/UL (ref 0–0.5)
EOSINOPHIL NFR BLD: 0.5 % (ref 0–8)
ERYTHROCYTE [DISTWIDTH] IN BLOOD BY AUTOMATED COUNT: 14.1 % (ref 11.5–14.5)
EST. GFR  (AFRICAN AMERICAN): 8.5 ML/MIN/1.73 M^2
EST. GFR  (NON AFRICAN AMERICAN): 7.4 ML/MIN/1.73 M^2
ETHANOL SERPL-MCNC: <3 MG/DL
GLUCOSE SERPL-MCNC: 77 MG/DL (ref 70–110)
GLUCOSE UR QL STRIP: NEGATIVE
HCT VFR BLD AUTO: 34.3 % (ref 40–54)
HGB BLD-MCNC: 11.9 G/DL (ref 14–18)
HGB UR QL STRIP: NEGATIVE
HYALINE CASTS #/AREA URNS LPF: 0 /LPF
IMM GRANULOCYTES # BLD AUTO: 0 K/UL (ref 0–0.04)
IMM GRANULOCYTES NFR BLD AUTO: 0 % (ref 0–0.5)
KETONES UR QL STRIP: NEGATIVE
LEUKOCYTE ESTERASE UR QL STRIP: NEGATIVE
LYMPHOCYTES # BLD AUTO: 1.7 K/UL (ref 1–4.8)
LYMPHOCYTES NFR BLD: 47.1 % (ref 18–48)
MCH RBC QN AUTO: 31.6 PG (ref 27–31)
MCHC RBC AUTO-ENTMCNC: 34.7 G/DL (ref 32–36)
MCV RBC AUTO: 91 FL (ref 82–98)
METHADONE UR QL SCN>300 NG/ML: NEGATIVE
MICROSCOPIC COMMENT: ABNORMAL
MONOCYTES # BLD AUTO: 0.4 K/UL (ref 0.3–1)
MONOCYTES NFR BLD: 11.2 % (ref 4–15)
NEUTROPHILS # BLD AUTO: 1.5 K/UL (ref 1.8–7.7)
NEUTROPHILS NFR BLD: 40.9 % (ref 38–73)
NITRITE UR QL STRIP: NEGATIVE
NRBC BLD-RTO: 0 /100 WBC
OPIATES UR QL SCN: NEGATIVE
PCP UR QL SCN>25 NG/ML: NEGATIVE
PH UR STRIP: >8 [PH] (ref 5–8)
PLATELET # BLD AUTO: 91 K/UL (ref 150–450)
PMV BLD AUTO: 10.7 FL (ref 9.2–12.9)
POTASSIUM SERPL-SCNC: 5.7 MMOL/L (ref 3.5–5.1)
PROT SERPL-MCNC: 7.3 G/DL (ref 6–8.4)
PROT UR QL STRIP: ABNORMAL
RBC # BLD AUTO: 3.76 M/UL (ref 4.6–6.2)
RBC #/AREA URNS HPF: 0 /HPF (ref 0–4)
SODIUM SERPL-SCNC: 136 MMOL/L (ref 136–145)
SP GR UR STRIP: <=1.005 (ref 1–1.03)
SQUAMOUS #/AREA URNS HPF: 0 /HPF
TOXICOLOGY INFORMATION: NORMAL
URN SPEC COLLECT METH UR: ABNORMAL
UROBILINOGEN UR STRIP-ACNC: 1 EU/DL
WBC # BLD AUTO: 3.65 K/UL (ref 3.9–12.7)
WBC #/AREA URNS HPF: 0 /HPF (ref 0–5)

## 2022-05-26 PROCEDURE — 99284 EMERGENCY DEPT VISIT MOD MDM: CPT | Mod: 25

## 2022-05-26 PROCEDURE — 85025 COMPLETE CBC W/AUTO DIFF WBC: CPT | Performed by: EMERGENCY MEDICINE

## 2022-05-26 PROCEDURE — 96374 THER/PROPH/DIAG INJ IV PUSH: CPT

## 2022-05-26 PROCEDURE — 80307 DRUG TEST PRSMV CHEM ANLYZR: CPT | Performed by: EMERGENCY MEDICINE

## 2022-05-26 PROCEDURE — 63600175 PHARM REV CODE 636 W HCPCS: Performed by: EMERGENCY MEDICINE

## 2022-05-26 PROCEDURE — 82077 ASSAY SPEC XCP UR&BREATH IA: CPT | Performed by: EMERGENCY MEDICINE

## 2022-05-26 PROCEDURE — 81000 URINALYSIS NONAUTO W/SCOPE: CPT | Mod: 59 | Performed by: EMERGENCY MEDICINE

## 2022-05-26 PROCEDURE — 80053 COMPREHEN METABOLIC PANEL: CPT | Performed by: EMERGENCY MEDICINE

## 2022-05-26 RX ORDER — LORAZEPAM 2 MG/ML
1 INJECTION INTRAMUSCULAR
Status: COMPLETED | OUTPATIENT
Start: 2022-05-26 | End: 2022-05-26

## 2022-05-26 RX ADMIN — LORAZEPAM 1 MG: 2 INJECTION INTRAMUSCULAR; INTRAVENOUS at 11:05

## 2022-05-26 NOTE — ED PROVIDER NOTES
Encounter Date: 5/26/2022       History     Chief Complaint   Patient presents with    Fatigue     On Valtrex for Shingles and believes it's causing him to be weak.    Weakness     67-year-old male with history of end-stage renal disease on dialysis, hypertension , diabetes, COPD recently diagnosed with shingles placed on Valtrex.  Patient believes the Valtrex is too strong for him, states he is feeling weak, he is tearful, but states he has no energy.  He is not ill appearing, alert oriented x4, GCS is 15. No focal deficits.  No nausea vomiting diarrhea.  No other issues          Review of patient's allergies indicates:  No Known Allergies  Past Medical History:   Diagnosis Date    Anemia     COPD (chronic obstructive pulmonary disease)     Decreased platelet count     Diabetes mellitus     Dialysis patient     LAST 11/02/2021 MWF    Dialysis patient     last 11/02/2021   every MWF    Dysphagia 10/2021    Hypertension     Insomnia     Kidney disease     PAD (peripheral artery disease)     Weight loss 10/2021     Past Surgical History:   Procedure Laterality Date    BACK SURGERY      KNEE SURGERY      PLACEMENT OF ARTERIOVENOUS GRAFT Left 2/10/2020    Procedure: INSERTION, GRAFT, ARTERIOVENOUS;  Surgeon: Artie Joshi MD;  Location: [facility];  Service: Cardiovascular;  Laterality: Left;    SPINAL FUSION N/A 11/7/2021    Procedure: FUSION, SPINE;  Surgeon: Vadim Lim MD;  Location: [address];  Service: Orthopedics;  Laterality: N/A;  ORIF T12 Fracture with T10-L2 Fusion, T11 and T12 laminectomy  [facility]  SNS: [employer]  New mali + pads  Craniotome and M8     Family History   Problem Relation Age of Onset    Diabetes Mother 83    No Known Problems Father     Kidney disease Sister     Diabetes Brother      Social History     Tobacco Use    Smoking status: Former Smoker     Packs/day: 1.00     Years: 20.00     Pack years: 20.00     Types: Cigars, Cigarettes    Smokeless  tobacco: Never Used    Tobacco comment: Also reports 5-6 yrs of smoking cigars   Substance Use Topics    Alcohol use: No    Drug use: No     Review of Systems   Constitutional: Negative for fever.   HENT: Negative for sore throat.    Respiratory: Negative for shortness of breath.    Cardiovascular: Negative for chest pain.   Gastrointestinal: Negative for nausea.   Genitourinary: Negative for dysuria.   Musculoskeletal: Negative for back pain.   Skin: Negative for rash.   Neurological: Negative for weakness.   Hematological: Does not bruise/bleed easily.   All other systems reviewed and are negative.      Physical Exam     Initial Vitals [05/26/22 1042]   BP Pulse Resp Temp SpO2   (!) 150/46 74 20 97.5 °F (36.4 °C) 100 %      MAP       --         Physical Exam    Nursing note and vitals reviewed.  Constitutional: He appears well-developed and well-nourished. He is not diaphoretic. No distress.   HENT:   Head: Normocephalic and atraumatic.   Eyes: Conjunctivae and EOM are normal. Pupils are equal, round, and reactive to light. Right eye exhibits no discharge. Left eye exhibits no discharge. No scleral icterus.   Neck: Neck supple. No JVD present.   Normal range of motion.  Cardiovascular: Normal rate, regular rhythm, normal heart sounds and intact distal pulses.   No murmur heard.  Pulmonary/Chest: Breath sounds normal. No stridor. No respiratory distress. He has no wheezes. He has no rhonchi. He has no rales. He exhibits no tenderness.   Abdominal: Abdomen is soft. Bowel sounds are normal. He exhibits no distension and no mass. There is no abdominal tenderness. There is no rebound and no guarding.   Musculoskeletal:         General: No tenderness or edema. Normal range of motion.      Cervical back: Normal range of motion and neck supple.     Neurological: He is alert and oriented to person, place, and time. He has normal strength. GCS score is 15. GCS eye subscore is 4. GCS verbal subscore is 5. GCS motor  subscore is 6.   Skin: Skin is warm and dry. Capillary refill takes less than 2 seconds. Rash noted.         ED Course   Procedures  Labs Reviewed   CBC W/ AUTO DIFFERENTIAL - Abnormal; Notable for the following components:       Result Value    WBC 3.65 (*)     RBC 3.76 (*)     Hemoglobin 11.9 (*)     Hematocrit 34.3 (*)     MCH 31.6 (*)     Platelets 91 (*)     Gran # (ANC) 1.5 (*)     All other components within normal limits   COMPREHENSIVE METABOLIC PANEL - Abnormal; Notable for the following components:    Potassium 5.7 (*)     CO2 31 (*)     Creatinine 7.1 (*)     Anion Gap 4 (*)     eGFR if  8.5 (*)     eGFR if non  7.4 (*)     All other components within normal limits   ALCOHOL,MEDICAL (ETHANOL)   DRUG SCREEN PANEL, URINE EMERGENCY   URINALYSIS, REFLEX TO URINE CULTURE          Imaging Results    None          Medications   lorazepam injection 1 mg (1 mg Intravenous Given 5/26/22 1130)     Medical Decision Making:   Differential Diagnosis:   Anxiety             ED Course as of 05/26/22 1328   Thu May 26, 2022   1326 Patient improved after medications.  He has an appointment at 2:30 p.m. today with his primary care physician, berry discharge [SD]      ED Course User Index  [SD] Chris Santa MD             Clinical Impression:   Final diagnoses:  [R53.1] Weakness (Primary)          ED Disposition Condition    Discharge Stable        ED Prescriptions     None        Follow-up Information     Follow up With Specialties Details Why Contact Info Additional Information    Primary care physician  Today       [facility] Emergency Department Emergency Medicine  If symptoms worsen [address] [place] [address] [id number] [phone] Floor 1           Chris Santa MD  05/26/22 7717

## 2022-05-26 NOTE — ED NOTES
Pt unable to collect a urine sample at this time, pt does not want to be catheterized. Will continue to plan care with MD.

## 2022-05-26 NOTE — Clinical Note
Shantelle accompanied their family member to the emergency department on 5/26/2022. They may return to work on 05/27/2022.  ?    If you have any questions or concerns, please don't hesitate to call.      Marj Blake RN

## 2022-06-06 PROBLEM — N18.5 CKD (CHRONIC KIDNEY DISEASE), STAGE V: Status: RESOLVED | Noted: 2020-01-20 | Resolved: 2022-06-06

## 2022-06-06 PROBLEM — M96.1 POSTLAMINECTOMY SYNDROME, NOT ELSEWHERE CLASSIFIED: Status: ACTIVE | Noted: 2021-12-03

## 2022-06-06 PROBLEM — R26.89 OTHER ABNORMALITIES OF GAIT AND MOBILITY: Status: ACTIVE | Noted: 2021-12-03

## 2022-06-06 PROBLEM — N18.9 CHRONIC KIDNEY DISEASE (CKD): Chronic | Status: RESOLVED | Noted: 2020-02-10 | Resolved: 2022-06-06

## 2022-06-06 PROBLEM — U07.1 COVID-19: Status: ACTIVE | Noted: 2022-01-03

## 2022-06-06 PROBLEM — M62.81 MUSCLE WEAKNESS (GENERALIZED): Status: ACTIVE | Noted: 2021-12-03

## 2022-06-06 PROBLEM — B02.8 HERPES ZOSTER WITH COMPLICATION: Status: ACTIVE | Noted: 2022-06-06

## 2022-06-06 PROBLEM — R27.8 OTHER LACK OF COORDINATION: Status: ACTIVE | Noted: 2021-12-03

## 2022-06-06 PROBLEM — B02.29 OTHER POSTHERPETIC NERVOUS SYSTEM INVOLVEMENT: Status: ACTIVE | Noted: 2022-06-06

## 2022-07-21 RX ORDER — CYCLOBENZAPRINE HCL 5 MG
5 TABLET ORAL 3 TIMES DAILY PRN
Qty: 90 TABLET | Refills: 0 | Status: SHIPPED | OUTPATIENT
Start: 2022-07-21 | End: 2022-07-31

## 2022-08-12 ENCOUNTER — HOSPITAL ENCOUNTER (EMERGENCY)
Facility: HOSPITAL | Age: 67
Discharge: HOME | End: 2022-08-12
Attending: EMERGENCY MEDICINE
Payer: MEDICARE

## 2022-08-12 VITALS
HEART RATE: 61 BPM | DIASTOLIC BLOOD PRESSURE: 81 MMHG | BODY MASS INDEX: 22.96 KG/M2 | SYSTOLIC BLOOD PRESSURE: 180 MMHG | RESPIRATION RATE: 16 BRPM | TEMPERATURE: 99 F | HEIGHT: 69 IN | WEIGHT: 155 LBS | OXYGEN SATURATION: 100 %

## 2022-08-12 DIAGNOSIS — M54.50 CHRONIC LEFT-SIDED LOW BACK PAIN WITHOUT SCIATICA: Primary | ICD-10-CM

## 2022-08-12 DIAGNOSIS — G89.29 CHRONIC LEFT-SIDED LOW BACK PAIN WITHOUT SCIATICA: Primary | ICD-10-CM

## 2022-08-12 PROCEDURE — 63600175 PHARM REV CODE 636 W HCPCS: Performed by: EMERGENCY MEDICINE

## 2022-08-12 PROCEDURE — 96372 THER/PROPH/DIAG INJ SC/IM: CPT | Performed by: EMERGENCY MEDICINE

## 2022-08-12 PROCEDURE — 25000003 PHARM REV CODE 250: Performed by: EMERGENCY MEDICINE

## 2022-08-12 PROCEDURE — 99284 EMERGENCY DEPT VISIT MOD MDM: CPT | Mod: 25

## 2022-08-12 RX ORDER — HYDROCODONE BITARTRATE AND ACETAMINOPHEN 7.5; 325 MG/1; MG/1
1 TABLET ORAL EVERY 8 HOURS PRN
Qty: 18 TABLET | Refills: 0 | Status: SHIPPED | OUTPATIENT
Start: 2022-08-12 | End: 2023-01-03 | Stop reason: SDUPTHER

## 2022-08-12 RX ORDER — AMLODIPINE BESYLATE 10 MG/1
10 TABLET ORAL
Status: COMPLETED | OUTPATIENT
Start: 2022-08-12 | End: 2022-08-12

## 2022-08-12 RX ORDER — HYDROMORPHONE HYDROCHLORIDE 1 MG/ML
1 INJECTION, SOLUTION INTRAMUSCULAR; INTRAVENOUS; SUBCUTANEOUS
Status: COMPLETED | OUTPATIENT
Start: 2022-08-12 | End: 2022-08-12

## 2022-08-12 RX ORDER — ONDANSETRON 4 MG/1
4 TABLET, ORALLY DISINTEGRATING ORAL
Status: COMPLETED | OUTPATIENT
Start: 2022-08-12 | End: 2022-08-12

## 2022-08-12 RX ADMIN — AMLODIPINE BESYLATE 10 MG: 10 TABLET ORAL at 02:08

## 2022-08-12 RX ADMIN — HYDROMORPHONE HYDROCHLORIDE 1 MG: 1 INJECTION, SOLUTION INTRAMUSCULAR; INTRAVENOUS; SUBCUTANEOUS at 02:08

## 2022-08-12 RX ADMIN — ONDANSETRON 4 MG: 4 TABLET, ORALLY DISINTEGRATING ORAL at 02:08

## 2022-08-12 NOTE — ED PROVIDER NOTES
Encounter Date: 8/12/2022       History     Chief Complaint   Patient presents with    Flank Pain     Pt c/o left sided flank/ribcage pain. Pt went to dialysis today. Denies dysuria or injury. No radiation of pain.     This is a 67-year-old male with history of chronic kidney disease, chronic low back pain, COPD, on dialysis presents to the emergency department with left-sided low back pain gradually getting worse over the past few days, relieved somewhat with old hydrocodone that he had from a previous herpes zoster infection.  Denies any new injury.  Has had this issue many times.  He is not take ends blood pressure medication this week.  He is not ill appearing, alert oriented x4, GCS is 15.  He walks with a cane chronically        Review of patient's allergies indicates:  No Known Allergies  Past Medical History:   Diagnosis Date    Chronic kidney disease (CKD) 2/10/2020    CKD (chronic kidney disease) stage 3, GFR 30-59 ml/min 10/16/2014    CKD (chronic kidney disease), stage V 1/20/2020    COPD (chronic obstructive pulmonary disease)     Dialysis patient     LAST 11/02/2021 MWF    Dysphagia 10/2021    Insomnia     Kidney disease     PAD (peripheral artery disease)     Weight loss 10/2021     Past Surgical History:   Procedure Laterality Date    BACK SURGERY      KNEE SURGERY Left     TKR    PLACEMENT OF ARTERIOVENOUS GRAFT Left 02/10/2020    Procedure: INSERTION, GRAFT, ARTERIOVENOUS;  Surgeon: Artie Joshi MD;  Location: [facility];  Service: Cardiovascular;  Laterality: Left;    SPINAL FUSION N/A 11/07/2021    Procedure: FUSION, SPINE;  Surgeon: Vadim Lim MD;  Location: [address];  Service: Orthopedics;  Laterality: N/A;  ORIF T12 Fracture with T10-L2 Fusion, T11 and T12 laminectomy  [facility]  SNS: [employer]  New mali + pads  Craniotome and M8     Family History   Problem Relation Age of Onset    Diabetes Mother 83    No Known Problems Father     Kidney disease Sister      Diabetes Brother      Social History     Tobacco Use    Smoking status: Former Smoker     Packs/day: 1.00     Years: 20.00     Pack years: 20.00     Types: Cigars, Cigarettes    Smokeless tobacco: Never Used    Tobacco comment: Also reports 5-6 yrs of smoking cigars   Substance Use Topics    Alcohol use: No    Drug use: No     Review of Systems   Constitutional: Negative for fever.   HENT: Negative for sore throat.    Respiratory: Negative for shortness of breath.    Cardiovascular: Negative for chest pain.   Gastrointestinal: Negative for nausea.   Genitourinary: Negative for dysuria.   Musculoskeletal: Positive for back pain.   Skin: Negative for rash.   Neurological: Negative for weakness.   Hematological: Does not bruise/bleed easily.   All other systems reviewed and are negative.      Physical Exam     Initial Vitals [08/12/22 1344]   BP Pulse Resp Temp SpO2   (!) 234/96 73 18 98.6 °F (37 °C) 100 %      MAP       --         Physical Exam    Nursing note and vitals reviewed.  Constitutional: He appears well-developed and well-nourished. He is not diaphoretic. No distress.   HENT:   Head: Normocephalic and atraumatic.   Eyes: Conjunctivae and EOM are normal. Pupils are equal, round, and reactive to light. Right eye exhibits no discharge. Left eye exhibits no discharge. No scleral icterus.   Neck: Neck supple. No JVD present.   Normal range of motion.  Cardiovascular: Normal rate, regular rhythm, normal heart sounds and intact distal pulses.   No murmur heard.  Pulmonary/Chest: Breath sounds normal. No stridor. No respiratory distress. He has no wheezes. He has no rhonchi. He has no rales. He exhibits no tenderness.   Abdominal: Abdomen is soft. Bowel sounds are normal. He exhibits no distension and no mass. There is no abdominal tenderness. There is no rebound and no guarding.   Musculoskeletal:         General: Tenderness present. No edema. Normal range of motion.      Cervical back: Normal range of  motion and neck supple.      Comments: Left-sided muscular low back tenderness with palpation, no bony tenderness, no bruising, no ecchymosis, neurovascular intact     Neurological: He is alert and oriented to person, place, and time. He has normal strength. GCS score is 15. GCS eye subscore is 4. GCS verbal subscore is 5. GCS motor subscore is 6.   Skin: Skin is warm and dry. Capillary refill takes less than 2 seconds.         ED Course   Procedures  Labs Reviewed - No data to display       Imaging Results          X-Ray Lumbar Spine Ap And Lateral (In process)                  Medications   HYDROmorphone injection 1 mg (1 mg Intramuscular Given 8/12/22 1403)   ondansetron disintegrating tablet 4 mg (4 mg Oral Given 8/12/22 1403)   amLODIPine tablet 10 mg (10 mg Oral Given 8/12/22 1403)     Medical Decision Making:   Differential Diagnosis:   Chronic low back pain, elevated blood pressure  ED Management:  Discussed need for patient's take his blood pressure medication daily as prescribed.  He understands             ED Course as of 08/12/22 1459   Fri Aug 12, 2022   1451 Lumbar spine x-ray without acute changes [SD]      ED Course User Index  [SD] Chris Santa MD             Clinical Impression:   Final diagnoses:  [M54.50, G89.29] Chronic left-sided low back pain without sciatica (Primary)          ED Disposition Condition    Discharge Stable        ED Prescriptions     Medication Sig Dispense Start Date End Date Auth. Provider    HYDROcodone-acetaminophen (NORCO) 7.5-325 mg per tablet Take 1 tablet by mouth every 8 (eight) hours as needed for Pain. 18 tablet 8/12/2022  Chris Santa MD        Follow-up Information     Follow up With Specialties Details Why Contact Info    Chencho Frank III, MD Internal Medicine In 3 days  [facility] [facility] [address]  [phone]             Chris Santa MD  08/12/22 0070

## 2022-08-18 PROBLEM — R76.8 ELEVATED ANTINUCLEAR ANTIBODY (ANA) LEVEL: Status: ACTIVE | Noted: 2022-08-18

## 2022-08-18 PROBLEM — D63.8 ANEMIA OF CHRONIC DISEASE: Status: ACTIVE | Noted: 2022-08-18

## 2023-02-09 ENCOUNTER — HOSPITAL ENCOUNTER (EMERGENCY)
Facility: HOSPITAL | Age: 68
Discharge: HOME | End: 2023-02-10
Attending: EMERGENCY MEDICINE
Payer: MEDICARE

## 2023-02-09 VITALS
HEIGHT: 74 IN | TEMPERATURE: 98 F | RESPIRATION RATE: 18 BRPM | WEIGHT: 168 LBS | DIASTOLIC BLOOD PRESSURE: 85 MMHG | BODY MASS INDEX: 21.56 KG/M2 | SYSTOLIC BLOOD PRESSURE: 187 MMHG | HEART RATE: 67 BPM | OXYGEN SATURATION: 98 %

## 2023-02-09 DIAGNOSIS — S20.212A CONTUSION OF RIB ON LEFT SIDE, INITIAL ENCOUNTER: ICD-10-CM
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
## 2023-02-09 DIAGNOSIS — S40.012A CONTUSION OF LEFT SHOULDER, INITIAL ENCOUNTER: ICD-10-CM

## 2023-02-09 DIAGNOSIS — J90 PLEURAL EFFUSION: ICD-10-CM

## 2023-02-09 PROCEDURE — 96374 THER/PROPH/DIAG INJ IV PUSH: CPT

## 2023-02-09 PROCEDURE — 63600175 PHARM REV CODE 636 W HCPCS: Performed by: EMERGENCY MEDICINE

## 2023-02-09 PROCEDURE — 99285 EMERGENCY DEPT VISIT HI MDM: CPT | Mod: 25

## 2023-02-09 PROCEDURE — 25500020 PHARM REV CODE 255: Performed by: EMERGENCY MEDICINE

## 2023-02-09 PROCEDURE — 96375 TX/PRO/DX INJ NEW DRUG ADDON: CPT

## 2023-02-09 RX ORDER — MORPHINE SULFATE 4 MG/ML
4 INJECTION, SOLUTION INTRAMUSCULAR; INTRAVENOUS
Status: COMPLETED | OUTPATIENT
Start: 2023-02-09 | End: 2023-02-09

## 2023-02-09 RX ORDER — ONDANSETRON 2 MG/ML
4 INJECTION INTRAMUSCULAR; INTRAVENOUS
Status: COMPLETED | OUTPATIENT
Start: 2023-02-09 | End: 2023-02-09

## 2023-02-09 RX ORDER — LIDOCAINE 50 MG/G
1 PATCH TOPICAL DAILY
Qty: 15 PATCH | Refills: 0 | Status: ON HOLD | OUTPATIENT
Start: 2023-02-09 | End: 2023-04-12 | Stop reason: HOSPADM

## 2023-02-09 RX ORDER — ORPHENADRINE CITRATE 30 MG/ML
30 INJECTION INTRAMUSCULAR; INTRAVENOUS
Status: COMPLETED | OUTPATIENT
Start: 2023-02-09 | End: 2023-02-09

## 2023-02-09 RX ORDER — TIZANIDINE 4 MG/1
4 TABLET ORAL EVERY 6 HOURS PRN
Qty: 20 TABLET | Refills: 0 | Status: SHIPPED | OUTPATIENT
Start: 2023-02-09 | End: 2023-02-19

## 2023-02-09 RX ADMIN — IOHEXOL 100 ML: 350 INJECTION, SOLUTION INTRAVENOUS at 09:02

## 2023-02-09 RX ADMIN — ORPHENADRINE CITRATE 30 MG: 30 INJECTION INTRAMUSCULAR; INTRAVENOUS at 10:02

## 2023-02-09 RX ADMIN — MORPHINE SULFATE 4 MG: 4 INJECTION INTRAVENOUS at 10:02

## 2023-02-09 RX ADMIN — ONDANSETRON HYDROCHLORIDE 4 MG: 2 SOLUTION INTRAMUSCULAR; INTRAVENOUS at 10:02

## 2023-02-10 NOTE — DISCHARGE INSTRUCTIONS
Take your norco as prescribed for pain. Make sure to go to dialysis tomorrow and let them know that you received IV contrast with your CT scan today.  Make sure to follow-up with your doctor about the pleural effusion (fluid in lungs) incidentally found on CT.  Read all discharge instructions/education provided: follow all recommendations and return precautions.  Take all medications as prescribed.  Follow up with your primary care doctor and/or specialist as directed.  Return to emergency department immediately for any new or worsening or recurrent symptoms.

## 2023-02-10 NOTE — ED PROVIDER NOTES
Encounter Date: 2/9/2023       History     Chief Complaint   Patient presents with    Fall     Trip and fall today. Complains of left shoulder and left rib pain 10/10. Denies loc. Denies blood thinners.     67-year-old male comes in complaining of left shoulder and left-sided rib pain after a trip and fall today.  Fall was non syncopal.  He says pain is 10/10.  No head injury no loss of consciousness.  No obvious deformity.  Lungs clear to auscultation bilateral    Review of patient's allergies indicates:  No Known Allergies  Past Medical History:   Diagnosis Date    Chronic kidney disease (CKD) 2/10/2020    CKD (chronic kidney disease) stage 3, GFR 30-59 ml/min 10/16/2014    CKD (chronic kidney disease), stage V 1/20/2020    COPD (chronic obstructive pulmonary disease)     Dialysis patient     LAST 11/02/2021 MWF    Dysphagia 10/2021    Insomnia     Kidney disease     PAD (peripheral artery disease)     Weight loss 10/2021     Past Surgical History:   Procedure Laterality Date    BACK SURGERY      KNEE SURGERY Left     TKR    PLACEMENT OF ARTERIOVENOUS GRAFT Left 02/10/2020    Procedure: INSERTION, GRAFT, ARTERIOVENOUS;  Surgeon: Artie Joshi MD;  Location: [facility];  Service: Cardiovascular;  Laterality: Left;    SPINAL FUSION N/A 11/07/2021    Procedure: FUSION, SPINE;  Surgeon: Vadim Lim MD;  Location: [address];  Service: Orthopedics;  Laterality: N/A;  ORIF T12 Fracture with T10-L2 Fusion, T11 and T12 laminectomy  [facility]  SNS: [employer]  New mali + pads  Craniotome and M8     Family History   Problem Relation Age of Onset    Diabetes Mother 83    No Known Problems Father     Kidney disease Sister     Diabetes Brother      Social History     Tobacco Use    Smoking status: Former     Packs/day: 1.00     Years: 20.00     Pack years: 20.00     Types: Cigars, Cigarettes    Smokeless tobacco: Never    Tobacco comments:     Also reports 5-6 yrs of smoking cigars   Substance Use Topics     [Excellent] : ~his/her~  mood as  excellent Alcohol use: No    Drug use: No     Review of Systems   Constitutional:  Negative for fever.   HENT:  Negative for sore throat.    Respiratory:  Negative for shortness of breath.    Cardiovascular:  Negative for chest pain.   Gastrointestinal:  Negative for abdominal pain, diarrhea, nausea and vomiting.   Genitourinary:  Negative for dysuria.   Musculoskeletal:  Negative for back pain.        Left shoulder and left-sided rib pain   Skin:  Negative for rash.   Neurological:  Negative for weakness.   Hematological:  Does not bruise/bleed easily.   All other systems reviewed and are negative.    Physical Exam     Initial Vitals [02/09/23 2048]   BP Pulse Resp Temp SpO2   (!) 196/98 78 18 97.6 °F (36.4 °C) 98 %      MAP       --         Physical Exam    Nursing note and vitals reviewed.  Constitutional: He appears well-developed and well-nourished. He is not diaphoretic.   HENT:   Head: Normocephalic and atraumatic.   Eyes: EOM are normal. Pupils are equal, round, and reactive to light.   Neck: Neck supple.   Normal range of motion.  Cardiovascular:  Normal rate and regular rhythm.           Pulmonary/Chest: Breath sounds normal. No respiratory distress. He has no wheezes.   Abdominal: Abdomen is soft. Bowel sounds are normal. There is no abdominal tenderness.   Musculoskeletal:         General: No tenderness or edema. Normal range of motion.      Cervical back: Normal range of motion and neck supple.     Neurological: He is alert and oriented to person, place, and time. He has normal strength. No cranial nerve deficit or sensory deficit.   Skin: Skin is warm and dry. Capillary refill takes less than 2 seconds.   Psychiatric: He has a normal mood and affect. Thought content normal.       ED Course   Procedures  Labs Reviewed - No data to display       Imaging Results              CT Chest With Contrast (In process)                      Medications   ondansetron injection 4 mg (4 mg Intravenous Given 2/9/23 8351)  [] : No   morphine injection 4 mg (4 mg Intravenous Given 2/9/23 2212)   orphenadrine injection 30 mg (30 mg Intravenous Given 2/9/23 2209)   iohexoL (OMNIPAQUE 350) injection 100 mL (100 mLs Intravenous Given 2/9/23 2155)                Attending Attestation:             Attending ED Notes:   67-year-old male comes in complaining of left shoulder and left-sided rib pain after a trip and fall today.  Fall was non syncopal.  He says pain is 10/10.  No head injury no loss of consciousness.  No obvious deformity.  Lungs clear to auscultation bilateral    CT chest normal.  Patient feels much better after medications administered in the emergency department. Patient is non-toxic appearing, in no acute distress, and vital signs are stable and normal upon discharge. Upon completion of ED evaluation and management, with consideration of thorough differential diagnosis, the patient was found to have no acutely abnormal physical exam findings or other pathology requiring further emergent intervention or admission at this time. Patient/caregiver has no complaints upon discharge and verbalizes understanding and agreement with diagnosis and treatment plan. Discharge instructions with return precautions provided. Patient/caregiver verbalizes understanding to return to ED immediately for any new or worsening symptoms and to follow up with PCP/specialist recommended in 1-2 days.                          Clinical Impression:   Final diagnoses:  [W19.XXXA] Fall, initial encounter (Primary)  [S20.212A] Contusion of rib on left side, initial encounter  [S40.012A] Contusion of left shoulder, initial encounter  [J90] Pleural effusion        ED Disposition Condition    Discharge Stable          ED Prescriptions       Medication Sig Dispense Start Date End Date Auth. Provider    LIDOcaine (LIDODERM) 5 % Place 1 patch onto the skin once daily. Remove & Discard patch within 12 hours or as directed by MD 15 patch 2/9/2023 -- Neha Pascal MD     [No] : In the past 12 months have you used drugs other than those required for medical reasons? No [No falls in past year] : Patient reported no falls in the past year tiZANidine (ZANAFLEX) 4 MG tablet Take 1 tablet (4 mg total) by mouth every 6 (six) hours as needed (muscle spasm). 20 tablet 2/9/2023 2/19/2023 Neha Pascal MD          Follow-up Information       Follow up With Specialties Details Why Contact Info Additional Information    Chencho Frank III, MD Internal Medicine Schedule an appointment as soon as possible for a visit in 1 day  [address] [place] [id number]  [phone]       dialysis  Go in 1 day       [facility] Emergency Department Emergency Medicine Go to  As needed, If symptoms worsen [facility] [facility] [id number]  [phone] Floor 1             Neha Pascal MD  02/10/23 0548     [0] : 2) Feeling down, depressed, or hopeless: Not at all (0) [[id number]] : 0 [Change in mental status noted] : No change in mental status noted [Language] : denies difficulty with language [Behavior] : denies difficulty with behavior [Learning/Retaining New Information] : denies difficulty learning/retaining new information [Handling Complex Tasks] : denies difficulty handling complex tasks [Reasoning] : denies difficulty with reasoning [Spatial Ability and Orientation] : denies difficulty with spatial ability and orientation [None] : None [Fully functional (bathing, dressing, toileting, transferring, walking, feeding)] : Fully functional (bathing, dressing, toileting, transferring, walking, feeding) [Fully functional (using the telephone, shopping, preparing meals, housekeeping, doing laundry, using] : Fully functional and needs no help or supervision to perform IADLs (using the telephone, shopping, preparing meals, housekeeping, doing laundry, using transportation, managing medications and managing finances) [Reports changes in hearing] : Reports no changes in hearing [Reports changes in vision] : Reports no changes in vision [With Patient/Caregiver] : With Patient/Caregiver [AdvancecareDate] : 12/23/2019

## 2023-02-15 PROBLEM — U07.1 COVID-19: Status: RESOLVED | Noted: 2022-01-03 | Resolved: 2023-02-15

## 2023-04-07 ENCOUNTER — LAB VISIT (OUTPATIENT)
Dept: LAB | Facility: HOSPITAL | Age: 68
End: 2023-04-07
Attending: INTERNAL MEDICINE
Payer: MEDICARE

## 2023-04-07 DIAGNOSIS — N18.6 END STAGE RENAL DISEASE: ICD-10-CM

## 2023-04-07 DIAGNOSIS — N18.9 ANEMIA OF CHRONIC RENAL FAILURE: Primary | ICD-10-CM

## 2023-04-07 DIAGNOSIS — D63.1 ANEMIA OF CHRONIC RENAL FAILURE: Primary | ICD-10-CM

## 2023-04-07 LAB
BASOPHILS # BLD AUTO: 0.01 K/UL (ref 0–0.2)
BASOPHILS NFR BLD: 0.3 % (ref 0–1.9)
DIFFERENTIAL METHOD: ABNORMAL
EOSINOPHIL # BLD AUTO: 0 K/UL (ref 0–0.5)
EOSINOPHIL NFR BLD: 0.3 % (ref 0–8)
ERYTHROCYTE [DISTWIDTH] IN BLOOD BY AUTOMATED COUNT: 14.5 % (ref 11.5–14.5)
HCT VFR BLD AUTO: 21.2 % (ref 40–54)
HGB BLD-MCNC: 7 G/DL (ref 14–18)
IMM GRANULOCYTES # BLD AUTO: 0.01 K/UL (ref 0–0.04)
IMM GRANULOCYTES NFR BLD AUTO: 0.3 % (ref 0–0.5)
LYMPHOCYTES # BLD AUTO: 0.8 K/UL (ref 1–4.8)
LYMPHOCYTES NFR BLD: 27.5 % (ref 18–48)
MCH RBC QN AUTO: 33.2 PG (ref 27–31)
MCHC RBC AUTO-ENTMCNC: 33 G/DL (ref 32–36)
MCV RBC AUTO: 101 FL (ref 82–98)
MONOCYTES # BLD AUTO: 0.3 K/UL (ref 0.3–1)
MONOCYTES NFR BLD: 9.1 % (ref 4–15)
NEUTROPHILS # BLD AUTO: 1.9 K/UL (ref 1.8–7.7)
NEUTROPHILS NFR BLD: 62.5 % (ref 38–73)
NRBC BLD-RTO: 0 /100 WBC
PLATELET # BLD AUTO: 87 K/UL (ref 150–450)
PLATELET BLD QL SMEAR: ABNORMAL
PMV BLD AUTO: 10.9 FL (ref 9.2–12.9)
RBC # BLD AUTO: 2.11 M/UL (ref 4.6–6.2)
WBC # BLD AUTO: 2.98 K/UL (ref 3.9–12.7)

## 2023-04-07 PROCEDURE — 85025 COMPLETE CBC W/AUTO DIFF WBC: CPT | Performed by: INTERNAL MEDICINE

## 2023-04-07 PROCEDURE — 36415 COLL VENOUS BLD VENIPUNCTURE: CPT | Performed by: INTERNAL MEDICINE

## 2023-04-10 ENCOUNTER — LAB VISIT (OUTPATIENT)
Dept: LAB | Facility: HOSPITAL | Age: 68
End: 2023-04-10
Attending: INTERNAL MEDICINE
Payer: MEDICARE

## 2023-04-10 DIAGNOSIS — N18.6 END STAGE RENAL DISEASE: ICD-10-CM

## 2023-04-10 DIAGNOSIS — D63.1 ANEMIA OF CHRONIC RENAL FAILURE: Primary | ICD-10-CM

## 2023-04-10 DIAGNOSIS — N18.9 ANEMIA OF CHRONIC RENAL FAILURE: Primary | ICD-10-CM

## 2023-04-10 LAB
BASOPHILS # BLD AUTO: 0.01 K/UL (ref 0–0.2)
BASOPHILS NFR BLD: 0.3 % (ref 0–1.9)
DIFFERENTIAL METHOD: ABNORMAL
EOSINOPHIL # BLD AUTO: 0 K/UL (ref 0–0.5)
EOSINOPHIL NFR BLD: 0.3 % (ref 0–8)
ERYTHROCYTE [DISTWIDTH] IN BLOOD BY AUTOMATED COUNT: 14.7 % (ref 11.5–14.5)
HCT VFR BLD AUTO: 20.6 % (ref 40–54)
HGB BLD-MCNC: 6.8 G/DL (ref 14–18)
IMM GRANULOCYTES # BLD AUTO: 0.01 K/UL (ref 0–0.04)
IMM GRANULOCYTES NFR BLD AUTO: 0.3 % (ref 0–0.5)
LYMPHOCYTES # BLD AUTO: 0.8 K/UL (ref 1–4.8)
LYMPHOCYTES NFR BLD: 22.9 % (ref 18–48)
MCH RBC QN AUTO: 33.2 PG (ref 27–31)
MCHC RBC AUTO-ENTMCNC: 33 G/DL (ref 32–36)
MCV RBC AUTO: 101 FL (ref 82–98)
MONOCYTES # BLD AUTO: 0.3 K/UL (ref 0.3–1)
MONOCYTES NFR BLD: 9.2 % (ref 4–15)
NEUTROPHILS # BLD AUTO: 2.2 K/UL (ref 1.8–7.7)
NEUTROPHILS NFR BLD: 67 % (ref 38–73)
NRBC BLD-RTO: 0 /100 WBC
PLATELET # BLD AUTO: 109 K/UL (ref 150–450)
PMV BLD AUTO: 11.1 FL (ref 9.2–12.9)
RBC # BLD AUTO: 2.05 M/UL (ref 4.6–6.2)
WBC # BLD AUTO: 3.27 K/UL (ref 3.9–12.7)

## 2023-04-10 PROCEDURE — 36415 COLL VENOUS BLD VENIPUNCTURE: CPT | Performed by: INTERNAL MEDICINE

## 2023-04-10 PROCEDURE — 85025 COMPLETE CBC W/AUTO DIFF WBC: CPT | Performed by: INTERNAL MEDICINE

## 2023-04-11 ENCOUNTER — HOSPITAL ENCOUNTER (OUTPATIENT)
Facility: HOSPITAL | Age: 68
Discharge: HOME | End: 2023-04-12
Attending: EMERGENCY MEDICINE | Admitting: INTERNAL MEDICINE
Payer: MEDICARE

## 2023-04-11 DIAGNOSIS — Z99.2 DIALYSIS PATIENT: ICD-10-CM

## 2023-04-11 DIAGNOSIS — I10 HYPERTENSION, UNSPECIFIED TYPE: ICD-10-CM

## 2023-04-11 DIAGNOSIS — D63.1 ANEMIA IN ESRD (END-STAGE RENAL DISEASE): Primary | ICD-10-CM

## 2023-04-11 DIAGNOSIS — N18.9 ANEMIA OF CHRONIC KIDNEY FAILURE: ICD-10-CM

## 2023-04-11 DIAGNOSIS — N18.6 ANEMIA IN ESRD (END-STAGE RENAL DISEASE): Primary | ICD-10-CM

## 2023-04-11 DIAGNOSIS — D63.1 ANEMIA OF CHRONIC KIDNEY FAILURE: ICD-10-CM

## 2023-04-11 LAB
ABO + RH BLD: NORMAL
ANION GAP SERPL CALC-SCNC: 2 MMOL/L (ref 8–16)
BASOPHILS # BLD AUTO: 0.01 K/UL (ref 0–0.2)
BASOPHILS NFR BLD: 0.4 % (ref 0–1.9)
BLD GP AB SCN CELLS X3 SERPL QL: NORMAL
BLD PROD TYP BPU: NORMAL
BLD PROD TYP BPU: NORMAL
BLOOD UNIT EXPIRATION DATE: NORMAL
BLOOD UNIT EXPIRATION DATE: NORMAL
BLOOD UNIT TYPE CODE: 9500
BLOOD UNIT TYPE CODE: 9500
BLOOD UNIT TYPE: NORMAL
BLOOD UNIT TYPE: NORMAL
BUN SERPL-MCNC: 26 MG/DL (ref 8–23)
CALCIUM SERPL-MCNC: 8.3 MG/DL (ref 8.7–10.5)
CHLORIDE SERPL-SCNC: 101 MMOL/L (ref 95–110)
CO2 SERPL-SCNC: 33 MMOL/L (ref 23–29)
CODING SYSTEM: NORMAL
CODING SYSTEM: NORMAL
CREAT SERPL-MCNC: 6.5 MG/DL (ref 0.5–1.4)
CROSSMATCH INTERPRETATION: NORMAL
CROSSMATCH INTERPRETATION: NORMAL
DIFFERENTIAL METHOD: ABNORMAL
DISPENSE STATUS: NORMAL
DISPENSE STATUS: NORMAL
EOSINOPHIL # BLD AUTO: 0 K/UL (ref 0–0.5)
EOSINOPHIL NFR BLD: 0.7 % (ref 0–8)
ERYTHROCYTE [DISTWIDTH] IN BLOOD BY AUTOMATED COUNT: 15.1 % (ref 11.5–14.5)
EST. GFR  (NO RACE VARIABLE): 8.8 ML/MIN/1.73 M^2
FOLATE SERPL-MCNC: 4.8 NG/ML (ref 4–24)
GLUCOSE SERPL-MCNC: 101 MG/DL (ref 70–110)
HCT VFR BLD AUTO: 22.7 % (ref 40–54)
HGB BLD-MCNC: 7.3 G/DL (ref 14–18)
IMM GRANULOCYTES # BLD AUTO: 0.01 K/UL (ref 0–0.04)
IMM GRANULOCYTES NFR BLD AUTO: 0.4 % (ref 0–0.5)
IRON SATN MFR SERPL: 72 % (ref 20–50)
IRON SERPL-MCNC: 109 UG/DL (ref 45–160)
LYMPHOCYTES # BLD AUTO: 0.7 K/UL (ref 1–4.8)
LYMPHOCYTES NFR BLD: 27 % (ref 18–48)
MCH RBC QN AUTO: 32.9 PG (ref 27–31)
MCHC RBC AUTO-ENTMCNC: 32.2 G/DL (ref 32–36)
MCV RBC AUTO: 102 FL (ref 82–98)
MONOCYTES # BLD AUTO: 0.3 K/UL (ref 0.3–1)
MONOCYTES NFR BLD: 11.3 % (ref 4–15)
NEUTROPHILS # BLD AUTO: 1.7 K/UL (ref 1.8–7.7)
NEUTROPHILS NFR BLD: 60.2 % (ref 38–73)
NRBC BLD-RTO: 0 /100 WBC
NUM UNITS TRANS PACKED RBC: NORMAL
NUM UNITS TRANS PACKED RBC: NORMAL
PLATELET # BLD AUTO: 107 K/UL (ref 150–450)
PMV BLD AUTO: 10.7 FL (ref 9.2–12.9)
POCT GLUCOSE: 87 MG/DL (ref 70–110)
POTASSIUM SERPL-SCNC: 3.5 MMOL/L (ref 3.5–5.1)
RBC # BLD AUTO: 2.22 M/UL (ref 4.6–6.2)
SODIUM SERPL-SCNC: 136 MMOL/L (ref 136–145)
SPECIMEN OUTDATE: NORMAL
TOTAL IRON BINDING CAPACITY: 152 UG/DL (ref 250–450)
VIT B12 SERPL-MCNC: 99 PG/ML (ref 210–950)
WBC # BLD AUTO: 2.74 K/UL (ref 3.9–12.7)

## 2023-04-11 PROCEDURE — G0378 HOSPITAL OBSERVATION PER HR: HCPCS

## 2023-04-11 PROCEDURE — 83540 ASSAY OF IRON: CPT | Performed by: EMERGENCY MEDICINE

## 2023-04-11 PROCEDURE — 99285 EMERGENCY DEPT VISIT HI MDM: CPT | Mod: 25

## 2023-04-11 PROCEDURE — 83921 ORGANIC ACID SINGLE QUANT: CPT | Performed by: EMERGENCY MEDICINE

## 2023-04-11 PROCEDURE — 96360 HYDRATION IV INFUSION INIT: CPT

## 2023-04-11 PROCEDURE — 25000003 PHARM REV CODE 250: Performed by: INTERNAL MEDICINE

## 2023-04-11 PROCEDURE — 85025 COMPLETE CBC W/AUTO DIFF WBC: CPT | Performed by: EMERGENCY MEDICINE

## 2023-04-11 PROCEDURE — 86920 COMPATIBILITY TEST SPIN: CPT | Performed by: EMERGENCY MEDICINE

## 2023-04-11 PROCEDURE — 25000003 PHARM REV CODE 250

## 2023-04-11 PROCEDURE — 80048 BASIC METABOLIC PNL TOTAL CA: CPT | Performed by: EMERGENCY MEDICINE

## 2023-04-11 PROCEDURE — 25000003 PHARM REV CODE 250: Performed by: EMERGENCY MEDICINE

## 2023-04-11 PROCEDURE — 82746 ASSAY OF FOLIC ACID SERUM: CPT | Performed by: EMERGENCY MEDICINE

## 2023-04-11 PROCEDURE — 82607 VITAMIN B-12: CPT | Performed by: EMERGENCY MEDICINE

## 2023-04-11 PROCEDURE — P9016 RBC LEUKOCYTES REDUCED: HCPCS | Performed by: INTERNAL MEDICINE

## 2023-04-11 PROCEDURE — 36415 COLL VENOUS BLD VENIPUNCTURE: CPT | Performed by: EMERGENCY MEDICINE

## 2023-04-11 PROCEDURE — 86900 BLOOD TYPING SEROLOGIC ABO: CPT | Performed by: EMERGENCY MEDICINE

## 2023-04-11 PROCEDURE — 86920 COMPATIBILITY TEST SPIN: CPT | Mod: 59 | Performed by: INTERNAL MEDICINE

## 2023-04-11 PROCEDURE — P9016 RBC LEUKOCYTES REDUCED: HCPCS | Performed by: EMERGENCY MEDICINE

## 2023-04-11 PROCEDURE — 36430 TRANSFUSION BLD/BLD COMPNT: CPT

## 2023-04-11 PROCEDURE — 83550 IRON BINDING TEST: CPT | Performed by: EMERGENCY MEDICINE

## 2023-04-11 PROCEDURE — 86920 COMPATIBILITY TEST SPIN: CPT | Mod: 59

## 2023-04-11 RX ORDER — HYDRALAZINE HYDROCHLORIDE 20 MG/ML
10 INJECTION INTRAMUSCULAR; INTRAVENOUS EVERY 6 HOURS PRN
Status: DISCONTINUED | OUTPATIENT
Start: 2023-04-11 | End: 2023-04-12 | Stop reason: HOSPADM

## 2023-04-11 RX ORDER — HYDROCODONE BITARTRATE AND ACETAMINOPHEN 500; 5 MG/1; MG/1
TABLET ORAL
Status: DISCONTINUED | OUTPATIENT
Start: 2023-04-11 | End: 2023-04-12 | Stop reason: HOSPADM

## 2023-04-11 RX ORDER — ATORVASTATIN CALCIUM 40 MG/1
40 TABLET, FILM COATED ORAL DAILY
Status: DISCONTINUED | OUTPATIENT
Start: 2023-04-11 | End: 2023-04-12 | Stop reason: HOSPADM

## 2023-04-11 RX ORDER — CARVEDILOL 12.5 MG/1
12.5 TABLET ORAL 2 TIMES DAILY WITH MEALS
Status: DISCONTINUED | OUTPATIENT
Start: 2023-04-11 | End: 2023-04-12 | Stop reason: HOSPADM

## 2023-04-11 RX ORDER — SODIUM CHLORIDE 0.9 % (FLUSH) 0.9 %
10 SYRINGE (ML) INJECTION
Status: DISCONTINUED | OUTPATIENT
Start: 2023-04-11 | End: 2023-04-12 | Stop reason: HOSPADM

## 2023-04-11 RX ORDER — AMLODIPINE BESYLATE 10 MG/1
10 TABLET ORAL DAILY
Status: DISCONTINUED | OUTPATIENT
Start: 2023-04-11 | End: 2023-04-12 | Stop reason: HOSPADM

## 2023-04-11 RX ORDER — LISINOPRIL 10 MG/1
10 TABLET ORAL DAILY
Status: DISCONTINUED | OUTPATIENT
Start: 2023-04-11 | End: 2023-04-11

## 2023-04-11 RX ORDER — SODIUM CHLORIDE 9 MG/ML
INJECTION, SOLUTION INTRAVENOUS ONCE
Status: CANCELLED | OUTPATIENT
Start: 2023-04-11 | End: 2023-04-11

## 2023-04-11 RX ORDER — ACETAMINOPHEN 325 MG/1
650 TABLET ORAL EVERY 8 HOURS PRN
Status: DISCONTINUED | OUTPATIENT
Start: 2023-04-11 | End: 2023-04-12 | Stop reason: HOSPADM

## 2023-04-11 RX ORDER — MUPIROCIN 20 MG/G
OINTMENT TOPICAL 2 TIMES DAILY
Status: DISCONTINUED | OUTPATIENT
Start: 2023-04-11 | End: 2023-04-12 | Stop reason: HOSPADM

## 2023-04-11 RX ORDER — LISINOPRIL 20 MG/1
20 TABLET ORAL DAILY
Status: DISCONTINUED | OUTPATIENT
Start: 2023-04-12 | End: 2023-04-12 | Stop reason: HOSPADM

## 2023-04-11 RX ADMIN — MUPIROCIN: 20 OINTMENT TOPICAL at 09:04

## 2023-04-11 RX ADMIN — ISOSORBIDE DINITRATE: 20 TABLET ORAL at 05:04

## 2023-04-11 RX ADMIN — ATORVASTATIN CALCIUM 40 MG: 40 TABLET, FILM COATED ORAL at 02:04

## 2023-04-11 RX ADMIN — SITAGLIPTIN 25 MG: 25 TABLET, FILM COATED ORAL at 02:04

## 2023-04-11 RX ADMIN — ACETAMINOPHEN 650 MG: 325 TABLET ORAL at 08:04

## 2023-04-11 RX ADMIN — LISINOPRIL 10 MG: 10 TABLET ORAL at 02:04

## 2023-04-11 RX ADMIN — SODIUM CHLORIDE 300 ML: 9 INJECTION, SOLUTION INTRAVENOUS at 07:04

## 2023-04-11 RX ADMIN — AMLODIPINE BESYLATE 10 MG: 10 TABLET ORAL at 02:04

## 2023-04-11 RX ADMIN — CARVEDILOL 12.5 MG: 12.5 TABLET, FILM COATED ORAL at 05:04

## 2023-04-11 NOTE — Clinical Note
Diagnosis: Anemia of chronic kidney failure [163479]   Future Attending Provider: MI GRAY III [[id number]]   Admitting Provider:: MI GRAY III [[id number]]

## 2023-04-11 NOTE — ED PROVIDER NOTES
EMERGENCY DEPARTMENT HISTORY AND PHYSICAL EXAM     This note is dictated on M*Modal word recognition program.  There are word recognition mistakes and grammatical errors that are occasionally missed on review.     Date: 4/11/2023   Patient Name: Kevin Sanon       History of Presenting Illness      Chief Complaint   Patient presents with    Anemia     Referred to ED by Dr. Frank for anemia and possible surgery consult.         1048   Kevin Sanon is a 68 y.o. male with PMHX of hypertension, type 2 diabetes, CKD, anemia of CKD who presents to the emergency department C/O anemia.    Patient sent to emergency department due to outpatient blood work that demonstrated hemoglobin 6.8.  Patient denies acute complaints.  Denies melena      PCP: Chencho Frank III, MD   Nephro: Dr Rucker       Current Facility-Administered Medications   Medication Dose Route Frequency Provider Last Rate Last Admin    0.9%  NaCl infusion (for blood administration)   Intravenous Q24H PRN Dragan Ibrahim MD        amLODIPine tablet 10 mg  10 mg Oral Daily Dragan Ibrahim MD        atorvastatin tablet 40 mg  40 mg Oral Daily Dragan Ibrahim MD        carvediloL tablet 12.5 mg  12.5 mg Oral BID WM Dragan Ibrahim MD        lisinopriL tablet 10 mg  10 mg Oral Daily Dragan Ibrahim MD        SITagliptin phosphate tablet 25 mg  25 mg Oral Daily Dragan Ibrahim MD         Current Outpatient Medications   Medication Sig Dispense Refill    amLODIPine (NORVASC) 10 MG tablet Take 1 tablet (10 mg total) by mouth once daily. 90 tablet 3    atorvastatin (LIPITOR) 40 MG tablet Take 1 tablet (40 mg total) by mouth once daily. 30 tablet 11    carvediloL (COREG) 6.25 MG tablet Take 2 tablets (12.5 mg total) by mouth 2 (two) times daily with meals. 180 tablet 3    HYDROcodone-acetaminophen (NORCO)  mg per tablet Take 1 tablet by mouth every 12 (twelve) hours as needed for Pain. 60 tablet 0    LIDOcaine (LIDODERM) 5 %  Place 1 patch onto the skin once daily. Remove & Discard patch within 12 hours or as directed by MD 15 patch 0    linaGLIPtin (TRADJENTA) 5 mg Tab tablet Take 1 tablet (5 mg total) by mouth once daily. 30 tablet 11    lisinopriL 10 MG tablet Take 1 tablet (10 mg total) by mouth once daily. 90 tablet 3    zolpidem (AMBIEN) 10 mg Tab TAKE 1 TABLET BY MOUTH EVERY DAY AT BEDTIME AS NEEDED       Facility-Administered Medications Ordered in Other Encounters   Medication Dose Route Frequency Provider Last Rate Last Admin    0.9%  NaCl infusion   Intravenous Continuous Navdeep Morales MD   New Bag at 02/10/20 0657    lidocaine (PF) 10 mg/ml (1%) injection 10 mg  1 mL Intradermal Once Navdeep Morales MD        ondansetron injection 4 mg  4 mg Intravenous Q12H PRN Navdeep Morales MD               Past History     Past Medical History:   Past Medical History:   Diagnosis Date    Chronic kidney disease (CKD) 02/10/2020    CKD (chronic kidney disease) stage 3, GFR 30-59 ml/min 10/16/2014    CKD (chronic kidney disease), stage V 01/20/2020    COPD (chronic obstructive pulmonary disease)     COVID-19 01/03/2022    Diabetes mellitus     Dialysis patient     LAST 11/02/2021 MWF    Dysphagia 10/2021    Insomnia     Kidney disease     PAD (peripheral artery disease)     Weight loss 10/2021        Past Surgical History:   Past Surgical History:   Procedure Laterality Date    BACK SURGERY      KNEE SURGERY Left     TKR    PLACEMENT OF ARTERIOVENOUS GRAFT Left 02/10/2020    Procedure: INSERTION, GRAFT, ARTERIOVENOUS;  Surgeon: Artie Joshi MD;  Location: [facility];  Service: Cardiovascular;  Laterality: Left;    SPINAL FUSION N/A 11/07/2021    Procedure: FUSION, SPINE;  Surgeon: Vadim Lim MD;  [facility];  Service: Orthopedics;  Laterality: N/A;  ORIF T12 Fracture with T10-L2 Fusion, T11 and T12 laminectomy  Arian  SNS: [employer]  [place] + pads  Craniotome and M8        Family History:   Family  History   Problem Relation Age of Onset    Diabetes Mother 83    No Known Problems Father     Kidney disease Sister     Diabetes Brother         Social History:   Social History     Tobacco Use    Smoking status: Former     Packs/day: 1.00     Years: 20.00     Pack years: 20.00     Types: Cigars, Cigarettes    Smokeless tobacco: Never    Tobacco comments:     Also reports 5-6 yrs of smoking cigars   Substance Use Topics    Alcohol use: No    Drug use: No        Allergies:   Review of patient's allergies indicates:  No Known Allergies       Review of Systems   Review of Systems   See HPI for pertinent positives and negatives       Physical Exam     Vitals:    04/11/23 1032 04/11/23 1200 04/11/23 1219 04/11/23 1346   BP: (!) 214/95 (!) 175/83 (!) 202/86 (!) 201/86   Patient Position:    Lying   Pulse: 79 77 68 80   Resp: 16 18 18    Temp: 97.7 °F (36.5 °C) 98 °F (36.7 °C) 97.8 °F (36.6 °C) 97.9 °F (36.6 °C)   TempSrc:   Oral Oral   SpO2: 100% 99% 99% 100%   Weight:       Height:          Physical Exam  Vitals and nursing note reviewed.   Constitutional:       General: He is not in acute distress.     Appearance: Normal appearance. He is well-developed. He is not ill-appearing.   HENT:      Head: Normocephalic and atraumatic.   Eyes:      Extraocular Movements: Extraocular movements intact.      Conjunctiva/sclera: Conjunctivae normal.   Cardiovascular:      Rate and Rhythm: Normal rate and regular rhythm.      Pulses: Normal pulses.      Heart sounds: Normal heart sounds.      Comments: Left AV fistula palpable thrill  Pulmonary:      Effort: Pulmonary effort is normal. No respiratory distress.   Musculoskeletal:         General: No deformity or signs of injury. Normal range of motion.      Cervical back: Normal range of motion. No rigidity.   Skin:     General: Skin is dry.      Coloration: Skin is not pale.      Findings: No rash.   Neurological:      General: No focal deficit present.      Mental Status: He is  alert and oriented to person, place, and time.      Cranial Nerves: No cranial nerve deficit.      Motor: No weakness.      Coordination: Coordination normal.            Diagnostic Study Results      Labs -   Recent Results (from the past 12 hour(s))   Folate    Collection Time: 04/11/23 10:50 AM   Result Value Ref Range    Folate 4.8 4.0 - 24.0 ng/mL   Iron and TIBC    Collection Time: 04/11/23 10:50 AM   Result Value Ref Range    Iron 109 45 - 160 ug/dL    TIBC 152 (L) 250 - 450 ug/dL    Iron Saturation 72 (H) 20 - 50 %   Type & Screen    Collection Time: 04/11/23 10:50 AM   Result Value Ref Range    Group & Rh O NEG     Indirect Lala NEG     Specimen Outdate 04/14/2023 23:59    Basic Metabolic Panel    Collection Time: 04/11/23 10:50 AM   Result Value Ref Range    Sodium 136 136 - 145 mmol/L    Potassium 3.5 3.5 - 5.1 mmol/L    Chloride 101 95 - 110 mmol/L    CO2 33 (H) 23 - 29 mmol/L    Glucose 101 70 - 110 mg/dL    BUN 26 (H) 8 - 23 mg/dL    Creatinine 6.5 (H) 0.5 - 1.4 mg/dL    Calcium 8.3 (L) 8.7 - 10.5 mg/dL    Anion Gap 2 (L) 8 - 16 mmol/L    eGFR 8.8 (A) >60 mL/min/1.73 m^2   Prepare RBC 1 Unit    Collection Time: 04/11/23 10:50 AM   Result Value Ref Range    UNIT NUMBER [id number]     Product Code T7770G32     DISPENSE STATUS ISSUED     CODING SYSTEM [id number]     Unit Blood Type Code 9500     Unit Blood Type O NEG     Unit Expiration [id number]     CROSSMATCH INTERPRETATION Compatible    CBC auto differential    Collection Time: 04/11/23 10:51 AM   Result Value Ref Range    WBC 2.74 (L) 3.90 - 12.70 K/uL    RBC 2.22 (L) 4.60 - 6.20 M/uL    Hemoglobin 7.3 (L) 14.0 - 18.0 g/dL    Hematocrit 22.7 (L) 40.0 - 54.0 %     (H) 82 - 98 fL    MCH 32.9 (H) 27.0 - 31.0 pg    MCHC 32.2 32.0 - 36.0 g/dL    RDW 15.1 (H) 11.5 - 14.5 %    Platelets 107 (L) 150 - 450 K/uL    MPV 10.7 9.2 - 12.9 fL    Immature Granulocytes 0.4 0.0 - 0.5 %    Gran # (ANC) 1.7 (L) 1.8 - 7.7 K/uL    Immature Grans (Abs) 0.01 0.00  - 0.04 K/uL    Lymph # 0.7 (L) 1.0 - 4.8 K/uL    Mono # 0.3 0.3 - 1.0 K/uL    Eos # 0.0 0.0 - 0.5 K/uL    Baso # 0.01 0.00 - 0.20 K/uL    nRBC 0 0 /100 WBC    Gran % 60.2 38.0 - 73.0 %    Lymph % 27.0 18.0 - 48.0 %    Mono % 11.3 4.0 - 15.0 %    Eosinophil % 0.7 0.0 - 8.0 %    Basophil % 0.4 0.0 - 1.9 %    Differential Method Automated    Vitamin B12    Collection Time: 04/11/23 10:51 AM   Result Value Ref Range    Vitamin B-12 99 (L) 210 - 950 pg/mL        Radiologic Studies -    No orders to display        Medications given in the ED-   Medications   0.9%  NaCl infusion (for blood administration) (has no administration in time range)   amLODIPine tablet 10 mg (has no administration in time range)   atorvastatin tablet 40 mg (has no administration in time range)   carvediloL tablet 12.5 mg (has no administration in time range)   lisinopriL tablet 10 mg (has no administration in time range)   SITagliptin phosphate tablet 25 mg (has no administration in time range)           Medical Decision Making    I am the first provider for this patient.     I reviewed the vital signs, available nursing notes, past medical history, past surgical history, family history and social history.     Vital Signs:  Reviewed the patient's vital signs.     Pulse Oximetry Analysis and Interpretation:    100% on Room Air, normal      External Test Results (Pertinent to encounter):    Records Reviewed: Nursing Notes, Current Prescription Medications, Old Medical Records, and External Medical Records     History Obtained By: Patient    Provider Notes: Kevin Sanon is a 68 y.o. male with anemia    Co-morbidities Considered: ESRD    Differential Diagnosis: Anemia       ED Course:    2:08 PM  Patient referred by primary care for anemia  Patient grossly asymptomatic.  Has a slightly macrocytic anemia.  Hemoglobin today 7.3.  6.8 yesterday.  Given range of diagnostic uncertainty and trending down hemoglobin will transfuse 1 unit in emergency  department.  Discussed with Dr. Frank office and nurse practitioner who stated they would like patient admitted to the hospital.  I discussed with Dr Stallworth who is aware of patient being admitted with plan for HD tomorrow         Problems Addressed:  Anemia    Procedures:   Procedures       Diagnosis and Disposition     Critical Care:      2:12 PM     I have spent 34 minutes of critical care time involved in lab review, consultations with specialist, family decision-making, and documentation.  During this entire length of time I was immediately available to the patient.     Critical Care:  The reason for providing this level of medical care for this critically ill patient was due a critical illness that impaired one or more vital organ systems such that there was a high probability of imminent or life threatening deterioration in the patients condition. This care involved high complexity decision making to assess, manipulate, and support vital system functions, to treat this degreee vital organ system failure and to prevent further life threatening deterioration of the patients condition.               CLINICAL IMPRESSION:         1. Anemia of chronic kidney failure    2. Hypertension, unspecified type              PLAN:   1. Admit to Hospital  2.      Medication List        ASK your doctor about these medications      amLODIPine 10 MG tablet  Commonly known as: NORVASC  Take 1 tablet (10 mg total) by mouth once daily.     atorvastatin 40 MG tablet  Commonly known as: LIPITOR  Take 1 tablet (40 mg total) by mouth once daily.     carvediloL 6.25 MG tablet  Commonly known as: COREG  Take 2 tablets (12.5 mg total) by mouth 2 (two) times daily with meals.     HYDROcodone-acetaminophen  mg per tablet  Commonly known as: NORCO  Take 1 tablet by mouth every 12 (twelve) hours as needed for Pain.     LIDOcaine 5 %  Commonly known as: LIDODERM  Place 1 patch onto the skin once daily. Remove & Discard patch within 12  hours or as directed by MD     linaGLIPtin 5 mg Tab tablet  Commonly known as: TRADJENTA  Take 1 tablet (5 mg total) by mouth once daily.     lisinopriL 10 MG tablet  Take 1 tablet (10 mg total) by mouth once daily.     zolpidem 10 mg Tab  Commonly known as: AMBIEN             3. No follow-up provider specified.     _______________________________     Please note that this dictation was completed with [employer], the computer voice recognition software.  Quite often unanticipated grammatical, syntax, homophones, and other interpretive errors are inadvertently transcribed by the computer software.  Please disregard these errors.  Please excuse any errors that have escaped final proofreading.             Dragan Ibrahim MD  04/14/23 0604

## 2023-04-12 VITALS
TEMPERATURE: 98 F | DIASTOLIC BLOOD PRESSURE: 78 MMHG | HEIGHT: 74 IN | WEIGHT: 157 LBS | RESPIRATION RATE: 18 BRPM | BODY MASS INDEX: 20.15 KG/M2 | HEART RATE: 66 BPM | SYSTOLIC BLOOD PRESSURE: 139 MMHG | OXYGEN SATURATION: 100 %

## 2023-04-12 LAB
ALBUMIN SERPL BCP-MCNC: 2.5 G/DL (ref 3.5–5.2)
ALP SERPL-CCNC: 56 U/L (ref 55–135)
ALT SERPL W/O P-5'-P-CCNC: 11 U/L (ref 10–44)
ANION GAP SERPL CALC-SCNC: 2 MMOL/L (ref 8–16)
AST SERPL-CCNC: 17 U/L (ref 10–40)
BASOPHILS # BLD AUTO: 0.01 K/UL (ref 0–0.2)
BASOPHILS NFR BLD: 0.3 % (ref 0–1.9)
BILIRUB SERPL-MCNC: 0.7 MG/DL (ref 0.1–1)
BLD PROD TYP BPU: NORMAL
BLOOD UNIT EXPIRATION DATE: NORMAL
BLOOD UNIT TYPE CODE: 9500
BLOOD UNIT TYPE: NORMAL
BUN SERPL-MCNC: 34 MG/DL (ref 8–23)
CALCIUM SERPL-MCNC: 8 MG/DL (ref 8.7–10.5)
CHLORIDE SERPL-SCNC: 102 MMOL/L (ref 95–110)
CO2 SERPL-SCNC: 33 MMOL/L (ref 23–29)
CODING SYSTEM: NORMAL
CREAT SERPL-MCNC: 7.8 MG/DL (ref 0.5–1.4)
CROSSMATCH INTERPRETATION: NORMAL
DIFFERENTIAL METHOD: ABNORMAL
DISPENSE STATUS: NORMAL
EOSINOPHIL # BLD AUTO: 0 K/UL (ref 0–0.5)
EOSINOPHIL NFR BLD: 0.7 % (ref 0–8)
ERYTHROCYTE [DISTWIDTH] IN BLOOD BY AUTOMATED COUNT: 18.5 % (ref 11.5–14.5)
EST. GFR  (NO RACE VARIABLE): 7.1 ML/MIN/1.73 M^2
GLUCOSE SERPL-MCNC: 74 MG/DL (ref 70–110)
HCT VFR BLD AUTO: 23.2 % (ref 40–54)
HCT VFR BLD AUTO: 35 % (ref 40–54)
HGB BLD-MCNC: 11.7 G/DL (ref 14–18)
HGB BLD-MCNC: 7.9 G/DL (ref 14–18)
IMM GRANULOCYTES # BLD AUTO: 0.01 K/UL (ref 0–0.04)
IMM GRANULOCYTES NFR BLD AUTO: 0.3 % (ref 0–0.5)
LYMPHOCYTES # BLD AUTO: 0.7 K/UL (ref 1–4.8)
LYMPHOCYTES NFR BLD: 24.1 % (ref 18–48)
MAGNESIUM SERPL-MCNC: 1.9 MG/DL (ref 1.6–2.6)
MCH RBC QN AUTO: 32.1 PG (ref 27–31)
MCHC RBC AUTO-ENTMCNC: 34.1 G/DL (ref 32–36)
MCV RBC AUTO: 94 FL (ref 82–98)
MONOCYTES # BLD AUTO: 0.4 K/UL (ref 0.3–1)
MONOCYTES NFR BLD: 12.1 % (ref 4–15)
NEUTROPHILS # BLD AUTO: 1.9 K/UL (ref 1.8–7.7)
NEUTROPHILS NFR BLD: 62.5 % (ref 38–73)
NRBC BLD-RTO: 0 /100 WBC
NUM UNITS TRANS PACKED RBC: NORMAL
PLATELET # BLD AUTO: 88 K/UL (ref 150–450)
PLATELET BLD QL SMEAR: ABNORMAL
PMV BLD AUTO: 11.5 FL (ref 9.2–12.9)
POTASSIUM SERPL-SCNC: 3.8 MMOL/L (ref 3.5–5.1)
PROT SERPL-MCNC: 5.5 G/DL (ref 6–8.4)
RBC # BLD AUTO: 2.46 M/UL (ref 4.6–6.2)
SODIUM SERPL-SCNC: 137 MMOL/L (ref 136–145)
WBC # BLD AUTO: 3.07 K/UL (ref 3.9–12.7)

## 2023-04-12 PROCEDURE — 36415 COLL VENOUS BLD VENIPUNCTURE: CPT

## 2023-04-12 PROCEDURE — 85025 COMPLETE CBC W/AUTO DIFF WBC: CPT

## 2023-04-12 PROCEDURE — G0378 HOSPITAL OBSERVATION PER HR: HCPCS

## 2023-04-12 PROCEDURE — 83735 ASSAY OF MAGNESIUM: CPT

## 2023-04-12 PROCEDURE — 25000003 PHARM REV CODE 250

## 2023-04-12 PROCEDURE — G0257 UNSCHED DIALYSIS ESRD PT HOS: HCPCS

## 2023-04-12 PROCEDURE — 25000003 PHARM REV CODE 250: Performed by: INTERNAL MEDICINE

## 2023-04-12 PROCEDURE — 85014 HEMATOCRIT: CPT | Mod: 59 | Performed by: INTERNAL MEDICINE

## 2023-04-12 PROCEDURE — 85018 HEMOGLOBIN: CPT | Performed by: INTERNAL MEDICINE

## 2023-04-12 PROCEDURE — 80100016 HC MAINTENANCE HEMODIALYSIS

## 2023-04-12 PROCEDURE — P9016 RBC LEUKOCYTES REDUCED: HCPCS

## 2023-04-12 PROCEDURE — 80053 COMPREHEN METABOLIC PANEL: CPT

## 2023-04-12 PROCEDURE — 25000003 PHARM REV CODE 250: Performed by: EMERGENCY MEDICINE

## 2023-04-12 PROCEDURE — 36415 COLL VENOUS BLD VENIPUNCTURE: CPT | Performed by: INTERNAL MEDICINE

## 2023-04-12 RX ORDER — POLYETHYLENE GLYCOL 3350 17 G/17G
17 POWDER, FOR SOLUTION ORAL DAILY
Status: DISCONTINUED | OUTPATIENT
Start: 2023-04-12 | End: 2023-04-12 | Stop reason: HOSPADM

## 2023-04-12 RX ORDER — ISOSORBIDE DINITRATE AND HYDRALAZINE HYDROCHLORIDE 37.5; 2 MG/1; MG/1
1 TABLET ORAL 2 TIMES DAILY
Qty: 60 TABLET | Refills: 0 | Status: SHIPPED | OUTPATIENT
Start: 2023-04-12 | End: 2023-06-08

## 2023-04-12 RX ORDER — HYDROCODONE BITARTRATE AND ACETAMINOPHEN 500; 5 MG/1; MG/1
TABLET ORAL
Status: DISCONTINUED | OUTPATIENT
Start: 2023-04-12 | End: 2023-04-12 | Stop reason: HOSPADM

## 2023-04-12 RX ORDER — DOCUSATE SODIUM 100 MG/1
100 CAPSULE, LIQUID FILLED ORAL DAILY
Status: DISCONTINUED | OUTPATIENT
Start: 2023-04-12 | End: 2023-04-12 | Stop reason: HOSPADM

## 2023-04-12 RX ORDER — POLYETHYLENE GLYCOL 3350 17 G/17G
17 POWDER, FOR SOLUTION ORAL DAILY
Qty: 30 EACH | Refills: 0 | Status: SHIPPED | OUTPATIENT
Start: 2023-04-13

## 2023-04-12 RX ADMIN — DOCUSATE SODIUM 100 MG: 100 CAPSULE, LIQUID FILLED ORAL at 05:04

## 2023-04-12 RX ADMIN — SITAGLIPTIN 25 MG: 25 TABLET, FILM COATED ORAL at 09:04

## 2023-04-12 RX ADMIN — MUPIROCIN: 20 OINTMENT TOPICAL at 09:04

## 2023-04-12 RX ADMIN — LISINOPRIL 20 MG: 20 TABLET ORAL at 09:04

## 2023-04-12 RX ADMIN — CARVEDILOL 12.5 MG: 12.5 TABLET, FILM COATED ORAL at 09:04

## 2023-04-12 RX ADMIN — ATORVASTATIN CALCIUM 40 MG: 40 TABLET, FILM COATED ORAL at 09:04

## 2023-04-12 RX ADMIN — POLYETHYLENE GLYCOL (3350) 17 G: 17 POWDER, FOR SOLUTION ORAL at 05:04

## 2023-04-12 RX ADMIN — AMLODIPINE BESYLATE 10 MG: 10 TABLET ORAL at 09:04

## 2023-04-12 NOTE — ASSESSMENT & PLAN NOTE
Patient had some episodes of hypotension yesterday, most likely secondary to anemia.  Patient's blood pressures were held and he was given a 300 mL bolus.  Blood pressures are improved this morning.

## 2023-04-12 NOTE — PLAN OF CARE
Discussed plan of care with pt and wife at bedside, both verbalized understanding. Pt noted with hypotensive episode earlier in shift, 300cc bolus normal saline given. Hypotension improved. Pt resting quietly. Instructed to call for needs.   Problem: Adult Inpatient Plan of Care  Goal: Plan of Care Review  Outcome: Ongoing, Progressing  Goal: Patient-Specific Goal (Individualized)  Outcome: Ongoing, Progressing  Goal: Absence of Hospital-Acquired Illness or Injury  Outcome: Ongoing, Progressing  Goal: Optimal Comfort and Wellbeing  Outcome: Ongoing, Progressing  Goal: Readiness for Transition of Care  Outcome: Ongoing, Progressing     Problem: Diabetes Comorbidity  Goal: Blood Glucose Level Within Targeted Range  Outcome: Ongoing, Progressing     Problem: Device-Related Complication Risk (Hemodialysis)  Goal: Safe, Effective Therapy Delivery  Outcome: Ongoing, Progressing     Problem: Hemodynamic Instability (Hemodialysis)  Goal: Effective Tissue Perfusion  Outcome: Ongoing, Progressing     Problem: Infection (Hemodialysis)  Goal: Absence of Infection Signs and Symptoms  Outcome: Ongoing, Progressing

## 2023-04-12 NOTE — SUBJECTIVE & OBJECTIVE
Past Medical History:   Diagnosis Date    Chronic kidney disease (CKD) 02/10/2020    CKD (chronic kidney disease) stage 3, GFR 30-59 ml/min 10/16/2014    CKD (chronic kidney disease), stage V 01/20/2020    COPD (chronic obstructive pulmonary disease)     COVID-19 01/03/2022    Diabetes mellitus     Dialysis patient     LAST 11/02/2021 MWF    Dysphagia 10/2021    Insomnia     Kidney disease     PAD (peripheral artery disease)     Weight loss 10/2021       Past Surgical History:   Procedure Laterality Date    BACK SURGERY      KNEE SURGERY Left     TKR    PLACEMENT OF ARTERIOVENOUS GRAFT Left 02/10/2020    Procedure: INSERTION, GRAFT, ARTERIOVENOUS;  Surgeon: Artie Joshi MD;  Location: [facility] OR;  Service: Cardiovascular;  Laterality: Left;    SPINAL FUSION N/A 11/07/2021    Procedure: FUSION, SPINE;  Surgeon: Vadim Lim MD;  Location: [facility] OR [facility];  Service: Orthopedics;  Laterality: N/A;  ORIF T12 Fracture with T10-L2 Fusion, T11 and T12 laminectomy  Arian  SNS: [employer]  [place] + pads  Craniotome and M8       Review of patient's allergies indicates:  No Known Allergies    Current Facility-Administered Medications on File Prior to Encounter   Medication    0.9%  NaCl infusion    lidocaine (PF) 10 mg/ml (1%) injection 10 mg    ondansetron injection 4 mg     Current Outpatient Medications on File Prior to Encounter   Medication Sig    amLODIPine (NORVASC) 10 MG tablet Take 1 tablet (10 mg total) by mouth once daily.    atorvastatin (LIPITOR) 40 MG tablet Take 1 tablet (40 mg total) by mouth once daily.    carvediloL (COREG) 6.25 MG tablet Take 2 tablets (12.5 mg total) by mouth 2 (two) times daily with meals.    HYDROcodone-acetaminophen (NORCO)  mg per tablet Take 1 tablet by mouth every 12 (twelve) hours as needed for Pain.    LIDOcaine (LIDODERM) 5 % Place 1 patch onto the skin once daily. Remove & Discard patch within 12 hours or as directed by MD    linaGLIPtin (TRADJENTA) 5 mg Tab  tablet Take 1 tablet (5 mg total) by mouth once daily.    lisinopriL 10 MG tablet Take 1 tablet (10 mg total) by mouth once daily.    zolpidem (AMBIEN) 10 mg Tab TAKE 1 TABLET BY MOUTH EVERY DAY AT BEDTIME AS NEEDED     Family History       Problem Relation (Age of Onset)    Diabetes Mother (83), Brother    Kidney disease Sister    No Known Problems Father          Tobacco Use    Smoking status: Former     Packs/day: 1.00     Years: 20.00     Pack years: 20.00     Types: Cigars, Cigarettes    Smokeless tobacco: Never    Tobacco comments:     Also reports 5-6 yrs of smoking cigars   Substance and Sexual Activity    Alcohol use: No    Drug use: No    Sexual activity: Yes     Partners: Female     Review of Systems   Constitutional:  Positive for activity change. Negative for appetite change, chills, diaphoresis, fatigue and fever.   HENT:  Negative for congestion, dental problem, ear discharge, ear pain, facial swelling, mouth sores, nosebleeds, rhinorrhea, sinus pain, sore throat, tinnitus, trouble swallowing and voice change.    Eyes:  Negative for photophobia, pain, discharge, redness, itching and visual disturbance.   Respiratory:  Negative for apnea, cough, choking, chest tightness, shortness of breath, wheezing and stridor.    Cardiovascular:  Negative for chest pain, palpitations and leg swelling.   Gastrointestinal:  Negative for abdominal distention, abdominal pain, anal bleeding, blood in stool, constipation, diarrhea, nausea, rectal pain and vomiting.   Endocrine: Negative for cold intolerance, heat intolerance, polydipsia, polyphagia and polyuria.   Genitourinary:  Negative for flank pain, frequency, genital sores and hematuria.        + dialysis   Musculoskeletal:  Positive for back pain. Negative for arthralgias, gait problem, joint swelling, myalgias, neck pain and neck stiffness.   Skin:  Negative for color change, rash and wound.   Allergic/Immunologic: Negative for environmental allergies, food  allergies and immunocompromised state.   Neurological:  Negative for dizziness, tremors, seizures, syncope, facial asymmetry, speech difficulty, weakness, light-headedness, numbness and headaches.   Hematological:  Negative for adenopathy. Does not bruise/bleed easily.   Psychiatric/Behavioral:  Negative for agitation, confusion, decreased concentration, hallucinations, self-injury and suicidal ideas. The patient is not nervous/anxious.    Objective:     Vital Signs (Most Recent):  Temp: 98.7 °F (37.1 °C) (04/12/23 0806)  Pulse: 67 (04/12/23 0806)  Resp: 18 (04/12/23 0806)  BP: (!) 146/67 (04/12/23 0806)  SpO2: 98 % (04/12/23 0806)   Vital Signs (24h Range):  Temp:  [97.6 °F (36.4 °C)-99 °F (37.2 °C)] 98.7 °F (37.1 °C)  Pulse:  [64-87] 67  Resp:  [16-20] 18  SpO2:  [96 %-100 %] 98 %  BP: ()/(41-95) 146/67     Weight: 71.2 kg (157 lb)  Body mass index is 20.16 kg/m².    Physical Exam  Constitutional:       General: He is not in acute distress.     Appearance: Normal appearance. He is normal weight. He is not ill-appearing or toxic-appearing.   HENT:      Head: Normocephalic and atraumatic.      Nose: Nose normal. No congestion or rhinorrhea.      Mouth/Throat:      Mouth: Mucous membranes are moist.      Pharynx: Oropharynx is clear. No oropharyngeal exudate or posterior oropharyngeal erythema.   Eyes:      General: No scleral icterus.        Right eye: No discharge.         Left eye: No discharge.      Extraocular Movements: Extraocular movements intact.      Conjunctiva/sclera: Conjunctivae normal.      Pupils: Pupils are equal, round, and reactive to light.   Neck:      Vascular: No carotid bruit.   Cardiovascular:      Rate and Rhythm: Regular rhythm.      Pulses: Normal pulses.      Heart sounds: Normal heart sounds. No murmur heard.    No friction rub. No gallop.   Pulmonary:      Effort: Pulmonary effort is normal. No respiratory distress.      Breath sounds: Normal breath sounds. No stridor. No  wheezing, rhonchi or rales.   Chest:      Chest wall: No tenderness.   Abdominal:      General: Bowel sounds are normal. There is no distension.      Palpations: Abdomen is soft. There is no mass.      Tenderness: There is no abdominal tenderness. There is no right CVA tenderness, left CVA tenderness, guarding or rebound.      Hernia: No hernia is present.   Musculoskeletal:         General: No swelling, tenderness or deformity. Normal range of motion.      Cervical back: Normal range of motion and neck supple. No rigidity.      Right lower leg: No edema.      Left lower leg: No edema.      Comments: Uses cane for assistance with ambulation   Lymphadenopathy:      Cervical: No cervical adenopathy.   Skin:     Capillary Refill: Capillary refill takes less than 2 seconds.      Coloration: Skin is not jaundiced or pale.      Findings: No rash.          Neurological:      General: No focal deficit present.      Mental Status: He is alert and oriented to person, place, and time. Mental status is at baseline.      Cranial Nerves: No cranial nerve deficit.      Sensory: No sensory deficit.      Motor: No weakness.      Coordination: Coordination normal.      Gait: Gait normal.          Significant Labs: All pertinent labs within the past 24 hours have been reviewed.  CBC:   Recent Labs   Lab 04/10/23  1030 04/11/23  1051   WBC 3.27* 2.74*   HGB 6.8* 7.3*   HCT 20.6* 22.7*   * 107*     CMP:   Recent Labs   Lab 04/11/23  1050      K 3.5      CO2 33*      BUN 26*   CREATININE 6.5*   CALCIUM 8.3*   ANIONGAP 2*       Significant Imaging: I have reviewed all pertinent imaging results/findings within the past 24 hours.

## 2023-04-12 NOTE — NURSING
Patient came up to the floor with a BP of 200/85 manual. Dr. Frank was contacted and new orders were given for scheduled and PRN blood pressure medication and 1 unit of PRBCs.     Scheduled BP meds were given.   PRBCS were started.    Around the 1 hour sandro of blood transfusion patient began to feel faint and dizzy. Patient's BP was 107/57. Blood glucose was 87.     [facility]'s answering service was called and assigned nurse spoke with ALBERT Hinojosa and new orders for a 300mL NS bolus was given.    New IV started, bolus is currently infusing.     Patient is stating he is starting to feel a lot better and is trying to eat his dinner.

## 2023-04-12 NOTE — HPI
68-year-old  male with a past medical history end-stage renal disease on dialysis, COPD, dysphagia, insomnia, PAD, type 2 diabetes mellitus, hypertension, anemia, GERD was sent to the hospital as a direct admission yesterday for transfusion related to worsening anemia.  Patient received 2 units of PRBCs yesterday, and labs are currently being drawn upon morning rounds to re-evaluate CBC.  Patient denies any weakness, chest pain, shortness of breath, hemoptysis, melena, hematuria.  Patient reports he had an EGD performed in the past due to dysphagia, but is unsure of the findings.  Patient instructed on the need for a stool sample for occult blood, and verbalized understanding.  Patient has some issues with constipation last night and medications were ordered and administered.  Patient reports he has been compliant with his dialysis sessions.  If this morning's labs are improved, patient will be discharged home after his dialysis session.  I have discussed this with Dr. Frank, who is in agreement, and he will have a face-to-face with patient as well.

## 2023-04-12 NOTE — H&P
[facility] Medicine  History & Physical    Patient Name: Kevin Sanon  MRN: [id number]  Patient Class: OP- Observation  Admission Date: 4/11/2023  Attending Physician: Chencho Frank III, MD   Primary Care Provider: Chencho Frank III, MD         Patient information was obtained from patient and past medical records.     Subjective:     Principal Problem:Anemia in ESRD (end-stage renal disease)    Chief Complaint:   Chief Complaint   Patient presents with    Anemia     Referred to ED by Dr. Frank for anemia and possible surgery consult.         HPI: 68-year-old  male with a past medical history end-stage renal disease on dialysis, COPD, dysphagia, insomnia, PAD, type 2 diabetes mellitus, hypertension, anemia, GERD was sent to the hospital as a direct admission yesterday for transfusion related to worsening anemia.  Patient received 2 units of PRBCs yesterday, and labs are currently being drawn upon morning rounds to re-evaluate CBC.  Patient denies any weakness, chest pain, shortness of breath, hemoptysis, melena, hematuria.  Patient reports he had an EGD performed in the past due to dysphagia, but is unsure of the findings.  Patient instructed on the need for a stool sample for occult blood, and verbalized understanding.  Patient has some issues with constipation last night and medications were ordered and administered.  Patient reports he has been compliant with his dialysis sessions.  If this morning's labs are improved, patient will be discharged home after his dialysis session.  I have discussed this with Dr. Frank, who is in agreement, and he will have a face-to-face with patient as well.      Past Medical History:   Diagnosis Date    Chronic kidney disease (CKD) 02/10/2020    CKD (chronic kidney disease) stage 3, GFR 30-59 ml/min 10/16/2014    CKD (chronic kidney disease), stage V 01/20/2020    COPD (chronic obstructive pulmonary disease)     COVID-19 01/03/2022     Diabetes mellitus     Dialysis patient     LAST 11/02/2021 MWF    Dysphagia 10/2021    Insomnia     Kidney disease     PAD (peripheral artery disease)     Weight loss 10/2021       Past Surgical History:   Procedure Laterality Date    BACK SURGERY      KNEE SURGERY Left     TKR    PLACEMENT OF ARTERIOVENOUS GRAFT Left 02/10/2020    Procedure: INSERTION, GRAFT, ARTERIOVENOUS;  Surgeon: Artie Joshi MD;  Location: [facility];  Service: Cardiovascular;  Laterality: Left;    SPINAL FUSION N/A 11/07/2021    Procedure: FUSION, SPINE;  Surgeon: Vadim Lim MD;  Location: [facility] OR [facility];  Service: Orthopedics;  Laterality: N/A;  ORIF T12 Fracture with T10-L2 Fusion, T11 and T12 laminectomy  [employer]  [employer]  [place] + pads  Craniotome and M8       Review of patient's allergies indicates:  No Known Allergies    Current Facility-Administered Medications on File Prior to Encounter   Medication    0.9%  NaCl infusion    lidocaine (PF) 10 mg/ml (1%) injection 10 mg    ondansetron injection 4 mg     Current Outpatient Medications on File Prior to Encounter   Medication Sig    amLODIPine (NORVASC) 10 MG tablet Take 1 tablet (10 mg total) by mouth once daily.    atorvastatin (LIPITOR) 40 MG tablet Take 1 tablet (40 mg total) by mouth once daily.    carvediloL (COREG) 6.25 MG tablet Take 2 tablets (12.5 mg total) by mouth 2 (two) times daily with meals.    HYDROcodone-acetaminophen (NORCO)  mg per tablet Take 1 tablet by mouth every 12 (twelve) hours as needed for Pain.    LIDOcaine (LIDODERM) 5 % Place 1 patch onto the skin once daily. Remove & Discard patch within 12 hours or as directed by MD    linaGLIPtin (TRADJENTA) 5 mg Tab tablet Take 1 tablet (5 mg total) by mouth once daily.    lisinopriL 10 MG tablet Take 1 tablet (10 mg total) by mouth once daily.    zolpidem (AMBIEN) 10 mg Tab TAKE 1 TABLET BY MOUTH EVERY DAY AT BEDTIME AS NEEDED     Family History       Problem Relation  (Age of Onset)    Diabetes Mother (83), Brother    Kidney disease Sister    No Known Problems Father          Tobacco Use    Smoking status: Former     Packs/day: 1.00     Years: 20.00     Pack years: 20.00     Types: Cigars, Cigarettes    Smokeless tobacco: Never    Tobacco comments:     Also reports 5-6 yrs of smoking cigars   Substance and Sexual Activity    Alcohol use: No    Drug use: No    Sexual activity: Yes     Partners: Female     Review of Systems   Constitutional:  Positive for activity change. Negative for appetite change, chills, diaphoresis, fatigue and fever.   HENT:  Negative for congestion, dental problem, ear discharge, ear pain, facial swelling, mouth sores, nosebleeds, rhinorrhea, sinus pain, sore throat, tinnitus, trouble swallowing and voice change.    Eyes:  Negative for photophobia, pain, discharge, redness, itching and visual disturbance.   Respiratory:  Negative for apnea, cough, choking, chest tightness, shortness of breath, wheezing and stridor.    Cardiovascular:  Negative for chest pain, palpitations and leg swelling.   Gastrointestinal:  Negative for abdominal distention, abdominal pain, anal bleeding, blood in stool, constipation, diarrhea, nausea, rectal pain and vomiting.   Endocrine: Negative for cold intolerance, heat intolerance, polydipsia, polyphagia and polyuria.   Genitourinary:  Negative for flank pain, frequency, genital sores and hematuria.        + dialysis   Musculoskeletal:  Positive for back pain. Negative for arthralgias, gait problem, joint swelling, myalgias, neck pain and neck stiffness.   Skin:  Negative for color change, rash and wound.   Allergic/Immunologic: Negative for environmental allergies, food allergies and immunocompromised state.   Neurological:  Negative for dizziness, tremors, seizures, syncope, facial asymmetry, speech difficulty, weakness, light-headedness, numbness and headaches.   Hematological:  Negative for adenopathy. Does not  bruise/bleed easily.   Psychiatric/Behavioral:  Negative for agitation, confusion, decreased concentration, hallucinations, self-injury and suicidal ideas. The patient is not nervous/anxious.    Objective:     Vital Signs (Most Recent):  Temp: 98.7 °F (37.1 °C) (04/12/23 0806)  Pulse: 67 (04/12/23 0806)  Resp: 18 (04/12/23 0806)  BP: (!) 146/67 (04/12/23 0806)  SpO2: 98 % (04/12/23 0806)   Vital Signs (24h Range):  Temp:  [97.6 °F (36.4 °C)-99 °F (37.2 °C)] 98.7 °F (37.1 °C)  Pulse:  [64-87] 67  Resp:  [16-20] 18  SpO2:  [96 %-100 %] 98 %  BP: ()/(41-95) 146/67     Weight: 71.2 kg (157 lb)  Body mass index is 20.16 kg/m².    Physical Exam  Constitutional:       General: He is not in acute distress.     Appearance: Normal appearance. He is normal weight. He is not ill-appearing or toxic-appearing.   HENT:      Head: Normocephalic and atraumatic.      Nose: Nose normal. No congestion or rhinorrhea.      Mouth/Throat:      Mouth: Mucous membranes are moist.      Pharynx: Oropharynx is clear. No oropharyngeal exudate or posterior oropharyngeal erythema.   Eyes:      General: No scleral icterus.        Right eye: No discharge.         Left eye: No discharge.      Extraocular Movements: Extraocular movements intact.      Conjunctiva/sclera: Conjunctivae normal.      Pupils: Pupils are equal, round, and reactive to light.   Neck:      Vascular: No carotid bruit.   Cardiovascular:      Rate and Rhythm: Regular rhythm.      Pulses: Normal pulses.      Heart sounds: Normal heart sounds. No murmur heard.    No friction rub. No gallop.   Pulmonary:      Effort: Pulmonary effort is normal. No respiratory distress.      Breath sounds: Normal breath sounds. No stridor. No wheezing, rhonchi or rales.   Chest:      Chest wall: No tenderness.   Abdominal:      General: Bowel sounds are normal. There is no distension.      Palpations: Abdomen is soft. There is no mass.      Tenderness: There is no abdominal tenderness. There is  no right CVA tenderness, left CVA tenderness, guarding or rebound.      Hernia: No hernia is present.   Musculoskeletal:         General: No swelling, tenderness or deformity. Normal range of motion.      Cervical back: Normal range of motion and neck supple. No rigidity.      Right lower leg: No edema.      Left lower leg: No edema.      Comments: Uses cane for assistance with ambulation   Lymphadenopathy:      Cervical: No cervical adenopathy.   Skin:     Capillary Refill: Capillary refill takes less than 2 seconds.      Coloration: Skin is not jaundiced or pale.      Findings: No rash.          Neurological:      General: No focal deficit present.      Mental Status: He is alert and oriented to person, place, and time. Mental status is at baseline.      Cranial Nerves: No cranial nerve deficit.      Sensory: No sensory deficit.      Motor: No weakness.      Coordination: Coordination normal.      Gait: Gait normal.          Significant Labs: All pertinent labs within the past 24 hours have been reviewed.  CBC:   Recent Labs   Lab 04/10/23  1030 04/11/23  1051   WBC 3.27* 2.74*   HGB 6.8* 7.3*   HCT 20.6* 22.7*   * 107*     CMP:   Recent Labs   Lab 04/11/23  1050      K 3.5      CO2 33*      BUN 26*   CREATININE 6.5*   CALCIUM 8.3*   ANIONGAP 2*       Significant Imaging: I have reviewed all pertinent imaging results/findings within the past 24 hours.    Assessment/Plan:     * Anemia in ESRD (end-stage renal disease)  Patient was transfused 2 units PRBCs yesterday.  Awaiting this morning's lab results.      COPD (chronic obstructive pulmonary disease)  Stable.      PAD (peripheral artery disease)  Continue current medication regimen.      Duodenal mass  Patient will need outpatient general surgery follow-up for EGD, colonoscopy.      HTN (hypertension)  Patient had some episodes of hypotension yesterday, most likely secondary to anemia.  Patient's blood pressures were held and he was  given a 300 mL bolus.  Blood pressures are improved this morning.    Diabetes mellitus type 2, controlled  Patient's FSGs are controlled on current medication regimen.  Last A1c reviewed-   Lab Results   Component Value Date    HGBA1C 4.9 12/20/2021     Most recent fingerstick glucose reviewed-   Recent Labs   Lab 04/11/23  1837   POCTGLUCOSE 87     Current correctional scale  none  Maintain anti-hyperglycemic dose as follows-   Antihyperglycemics (From admission, onward)    Start     Stop Route Frequency Ordered    04/11/23 1500  SITagliptin phosphate tablet 25 mg         -- Oral Daily 04/11/23 1330        Patient will be discharged home after dialysis session if labs are stable.      VTE Risk Mitigation (From admission, onward)         Ordered     IP VTE HIGH RISK PATIENT  Once         04/11/23 1533     Place sequential compression device  Until discontinued         04/11/23 1533                    JODY ESTEBAN  Department of Hospital Medicine  [facility]

## 2023-04-12 NOTE — PLAN OF CARE
[facility]  Initial [facility]charge [facility]       Primary Care Provider: Chencho Frank III, MD    Admission Diagnosis: Anemia of chronic kidney failure [N18.9, D63.1]  Hypertension, unspecified type [I10]    Admission Date: 4/11/2023  Expected Discharge Date:          Payor: MEDICARE / Plan: MEDICARE PART A & B / Product Type: Government /     Extended Emergency Contact Information  Primary Emergency Contact: WorleyCatherine dickinson   [facility]  Work Phone: [phone]  Mobile Phone: [phone]  Relation: Significant other  Secondary Emergency Contact: Lexie Solorio  Mobile Phone: [phone]  Relation: Daughter    Discharge Plan A: Home  Discharge Plan B: Home, Home with family      [facility] [address]  [facility] [address]  Phone: [phone] Fax: [phone]    [facility] [address] [facility] [facility] [address]  Phone: [phone] Fax: [phone]      Initial Assessment (most recent)       Adult Discharge Assessment - 04/12/23 0957          Discharge Assessment    Assessment Type Discharge Planning Assessment     Confirmed/corrected address, phone number and insurance Yes     Confirmed Demographics Correct on Facesheet     Source of Information patient     When was your last doctors appointment? 03/02/23     Reason For Admission Anemia in ESRD (end-stage renal disease)     People in Home significant other     Do you expect to return to your current living situation? Yes     Prior to hospitilization cognitive status: Alert/Oriented     Current cognitive status: Alert/Oriented     Walking or Climbing Stairs ambulation difficulty, requires equipment     Mobility Management quad cane     Dressing/Bathing bathing difficulty, requires equipment     Dressing/Bathing Management shower chair     Do you have any problems with: Needs other help     Specify other help The patient stated that  "his significant other assist him with his meals     Home Accessibility stairs to enter home     Number of Stairs, Main Entrance three;four     Stair Railings, Main Entrance railing on right side (ascending)     Home Layout Able to live on 1st floor     Equipment Currently Used at Home cane, quad;shower chair     Readmission within 30 days? No     Patient currently being followed by outpatient case management? No     Do you currently have service(s) that help you manage your care at home? No     Do you take prescription medications? Yes     Do you have prescription coverage? Yes   Unable to verify. The patient stated that he has coverage, and he has to pay "a few dollars" sometimes.    Do you have any problems affording any of your prescribed medications? No     Is the patient taking medications as prescribed? yes     Who is going to help you get home at discharge? self or family     How do you get to doctors appointments? car, drives self     Are you on dialysis? Yes     Dialysis Name and Scheduled days [facility]     Do you take coumadin? No     Discharge Plan A Home     Discharge Plan B Home;Home with family     DME Needed Upon Discharge  other (see comments)   TBD    Discharge Plan discussed with: Patient                 Initial discharge assessment completed. At this time, the patient does not require any assistance from case management. Attempted to offer the patient resources, but he politely declined. CM/SW will remain available. Contact information was left in the patient's room.                "

## 2023-04-12 NOTE — NURSING
Pt received bolus of 300mL NS as ordered. Spoke with Ashish Willett NP for Dr. Frank and informed of improved but still slightly low 110/52, per Ashish hold patients PM dose of Apresoline. Patient and wife informed and in agreement. Will continue to monitor.

## 2023-04-12 NOTE — ASSESSMENT & PLAN NOTE
Patient's FSGs are controlled on current medication regimen.  Last A1c reviewed-   Lab Results   Component Value Date    HGBA1C 4.9 12/20/2021     Most recent fingerstick glucose reviewed-   Recent Labs   Lab 04/11/23  1837   POCTGLUCOSE 87     Current correctional scale  none  Maintain anti-hyperglycemic dose as follows-   Antihyperglycemics (From admission, onward)    Start     Stop Route Frequency Ordered    04/11/23 1500  SITagliptin phosphate tablet 25 mg         -- Oral Daily 04/11/23 1330        Patient will be discharged home after dialysis session if labs are stable.

## 2023-04-12 NOTE — PLAN OF CARE
04/12/23 0812   SANABRIA Message   Medicare Outpatient and Observation Notification regarding financial responsibility Given to patient/caregiver;Explained to patient/caregiver;Signed/date by patient/caregiver   Date SANABRIA was signed 04/12/23   Time SANABRIA was signed 0806

## 2023-04-13 NOTE — HOSPITAL COURSE
Discharge Summary: Patient required a third unit of PRBC d/t continued anemia despite yesterday's transfusions. Patient was transfused during dialysis today, and patient's H/H is now stable. Patient will be discharged home with spouse, and will be followed closely on an outpatient basis. Patient had dialysis MWF, and he is compliant with attending sessions. Patient will need to f/u with general surgery on an outpatient basis for EGD and colonoscopy to determine if there is a potential GI source of blood loss.

## 2023-04-13 NOTE — DISCHARGE SUMMARY
[facility] Medicine  Discharge Summary      Patient Name: Kevin Sanon  MRN: [id number]  [facility] [id number]  Patient Class: OP- Observation  Admission Date: 4/11/2023  Hospital Length of Stay: 0 days  Discharge Date and Time:  04/12/2023 7:06 PM  Attending Physician: Chencho Frank III, MD   Discharging Provider: JODY ESTEBAN  Primary Care Provider: Chencho Frank III, MD    Primary Care Team: Networked reference to record PCT     HPI:   68-year-old  male with a past medical history end-stage renal disease on dialysis, COPD, dysphagia, insomnia, PAD, type 2 diabetes mellitus, hypertension, anemia, GERD was sent to the hospital as a direct admission yesterday for transfusion related to worsening anemia.  Patient received 2 units of PRBCs yesterday, and labs are currently being drawn upon morning rounds to re-evaluate CBC.  Patient denies any weakness, chest pain, shortness of breath, hemoptysis, melena, hematuria.  Patient reports he had an EGD performed in the past due to dysphagia, but is unsure of the findings.  Patient instructed on the need for a stool sample for occult blood, and verbalized understanding.  Patient has some issues with constipation last night and medications were ordered and administered.  Patient reports he has been compliant with his dialysis sessions.  If this morning's labs are improved, patient will be discharged home after his dialysis session.  I have discussed this with Dr. Frank, who is in agreement, and he will have a face-to-face with patient as well.      * No surgery found *      Hospital Course:   Discharge Summary: Patient required a third unit of PRBC d/t continued anemia despite yesterday's transfusions. Patient was transfused during dialysis today, and patient's H/H is now stable. Patient will be discharged home with spouse, and will be followed closely on an outpatient basis. Patient had dialysis MWF, and he is compliant with attending  sessions. Patient will need to f/u with general surgery on an outpatient basis for EGD and colonoscopy to determine if there is a potential GI source of blood loss.       Goals of Care Treatment Preferences:  Code Status: Full Code      Consults:   Consults (From admission, onward)        Status Ordering Provider     Inpatient consult to Nephrology  Once        Provider:  Deborah Rucker MD    Acknowledged REMA HOLDER          Oncology  * Anemia in ESRD (end-stage renal disease)  Patient was transfused 2 units PRBCs yesterday.  Awaiting this morning's lab results.  Patient given additional unit of PRBC today with dialysis, H/H now stable.       GI  Duodenal mass  Patient will need outpatient general surgery follow-up for EGD, colonoscopy.        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 Schedule an appointment as soon as possible for a visit in 1 week(s).    Specialty: Internal Medicine  Contact information:  [address] [place] [id number]  [phone]                       Patient Instructions:      Ambulatory referral/consult to General Surgery   Standing Status: Future   Referral Priority: Routine Referral Type: Consultation   Referral Reason: Specialty Services Required   Referred to Provider: ELIZABETH ROSS Requested Specialty: General Surgery   Number of Visits Requested: 1     Diet Adult Regular     Diet diabetic      Diet Cardiac     Diet renal     Activity as tolerated       Significant Diagnostic Studies: Labs:   CBC   Recent Labs   Lab 04/11/23  1051 04/12/23  0828 04/12/23  1749   WBC 2.74* 3.07*  --    HGB 7.3* 7.9* 11.7*   HCT 22.7* 23.2* 35.0*   * 88*  --        Pending Diagnostic Studies:     None         Medications:  Reconciled Home Medications:      Medication List      START taking these medications    isosorbide-hydrALAZINE 20-37.5 mg 20-37.5 mg Tab  Commonly known as: BIDIL  Take 1 tablet by mouth 2 (two) times daily.     polyethylene glycol 17 gram Pwpk  Commonly known as: GLYCOLAX  Take 17 g by mouth once daily.  Start taking on: April 13, 2023        CONTINUE taking these medications    amLODIPine 10 MG tablet  Commonly known as: NORVASC  Take 1 tablet (10 mg total) by mouth once daily.     atorvastatin 40 MG tablet  Commonly known as: LIPITOR  Take 1 tablet (40 mg total) by mouth once daily.     carvediloL 6.25 MG tablet  Commonly known as: COREG  Take 2 tablets (12.5 mg total) by mouth 2 (two) times daily with meals.     HYDROcodone-acetaminophen  mg per tablet  Commonly known as: NORCO  Take 1 tablet by mouth every 12 (twelve) hours as needed for Pain.     linaGLIPtin 5 mg Tab tablet  Commonly known as: TRADJENTA  Take 1 tablet (5 mg total) by mouth once daily.     lisinopriL 10 MG tablet  Take 1 tablet (10 mg total) by mouth once daily.     zolpidem 10 mg Tab  Commonly known as: AMBIEN  TAKE 1 TABLET BY MOUTH EVERY DAY AT BEDTIME AS NEEDED        STOP taking these medications    LIDOcaine 5 %  Commonly known as: LIDODERM            Indwelling Lines/Drains at time of discharge:   Lines/Drains/Airways     Central Venous Catheter Line  Duration                Hemodialysis AV Graft 02/10/20 Left upper arm 1157 days                Time spent on the discharge of patient: 45 minutes         JODY ESTEBAN  Department of Hospital Medicine  [facility]

## 2023-04-13 NOTE — ASSESSMENT & PLAN NOTE
Patient was transfused 2 units PRBCs yesterday.  Awaiting this morning's lab results.  Patient given additional unit of PRBC today with dialysis, H/H now stable.

## 2023-04-15 LAB — METHYLMALONATE SERPL-SCNC: 1.54 UMOL/L

## 2023-04-19 ENCOUNTER — LAB VISIT (OUTPATIENT)
Dept: LAB | Facility: HOSPITAL | Age: 68
End: 2023-04-19
Attending: INTERNAL MEDICINE
Payer: MEDICARE

## 2023-04-19 DIAGNOSIS — N18.6 END STAGE RENAL DISEASE: ICD-10-CM

## 2023-04-19 DIAGNOSIS — N63.10 MASS OF RIGHT BREAST: Primary | ICD-10-CM

## 2023-04-19 LAB
BASOPHILS # BLD AUTO: 0.01 K/UL (ref 0–0.2)
BASOPHILS NFR BLD: 0.4 % (ref 0–1.9)
DIFFERENTIAL METHOD: ABNORMAL
EOSINOPHIL # BLD AUTO: 0 K/UL (ref 0–0.5)
EOSINOPHIL NFR BLD: 0.4 % (ref 0–8)
ERYTHROCYTE [DISTWIDTH] IN BLOOD BY AUTOMATED COUNT: 16.2 % (ref 11.5–14.5)
HCT VFR BLD AUTO: 28.2 % (ref 40–54)
HGB BLD-MCNC: 9.3 G/DL (ref 14–18)
IMM GRANULOCYTES # BLD AUTO: 0.01 K/UL (ref 0–0.04)
IMM GRANULOCYTES NFR BLD AUTO: 0.4 % (ref 0–0.5)
LYMPHOCYTES # BLD AUTO: 0.7 K/UL (ref 1–4.8)
LYMPHOCYTES NFR BLD: 23.8 % (ref 18–48)
MCH RBC QN AUTO: 31.7 PG (ref 27–31)
MCHC RBC AUTO-ENTMCNC: 33 G/DL (ref 32–36)
MCV RBC AUTO: 96 FL (ref 82–98)
MONOCYTES # BLD AUTO: 0.2 K/UL (ref 0.3–1)
MONOCYTES NFR BLD: 7.2 % (ref 4–15)
NEUTROPHILS # BLD AUTO: 1.9 K/UL (ref 1.8–7.7)
NEUTROPHILS NFR BLD: 67.8 % (ref 38–73)
NRBC BLD-RTO: 0 /100 WBC
PLATELET # BLD AUTO: 86 K/UL (ref 150–450)
PMV BLD AUTO: 11.6 FL (ref 9.2–12.9)
RBC # BLD AUTO: 2.93 M/UL (ref 4.6–6.2)
WBC # BLD AUTO: 2.77 K/UL (ref 3.9–12.7)

## 2023-04-19 PROCEDURE — 85025 COMPLETE CBC W/AUTO DIFF WBC: CPT | Performed by: INTERNAL MEDICINE

## 2023-04-19 PROCEDURE — 36415 COLL VENOUS BLD VENIPUNCTURE: CPT | Performed by: INTERNAL MEDICINE

## 2023-04-20 ENCOUNTER — OFFICE VISIT (OUTPATIENT)
Dept: SURGERY | Facility: CLINIC | Age: 68
End: 2023-04-20
Payer: MEDICARE

## 2023-04-20 VITALS
WEIGHT: 158.19 LBS | HEIGHT: 74 IN | OXYGEN SATURATION: 99 % | SYSTOLIC BLOOD PRESSURE: 208 MMHG | BODY MASS INDEX: 20.3 KG/M2 | HEART RATE: 75 BPM | DIASTOLIC BLOOD PRESSURE: 88 MMHG

## 2023-04-20 DIAGNOSIS — N18.6 ANEMIA IN ESRD (END-STAGE RENAL DISEASE): Primary | ICD-10-CM

## 2023-04-20 DIAGNOSIS — Z01.818 PRE-OP TESTING: ICD-10-CM

## 2023-04-20 DIAGNOSIS — D63.1 ANEMIA IN ESRD (END-STAGE RENAL DISEASE): Primary | ICD-10-CM

## 2023-04-20 PROCEDURE — 99204 OFFICE O/P NEW MOD 45 MIN: CPT | Mod: S$PBB,,, | Performed by: STUDENT IN AN ORGANIZED HEALTH CARE EDUCATION/TRAINING PROGRAM

## 2023-04-20 PROCEDURE — 99204 PR OFFICE/OUTPT VISIT, NEW, LEVL IV, 45-59 MIN: ICD-10-PCS | Mod: S$PBB,,, | Performed by: STUDENT IN AN ORGANIZED HEALTH CARE EDUCATION/TRAINING PROGRAM

## 2023-04-20 PROCEDURE — 99999 PR PBB SHADOW E&M-EST. PATIENT-LVL V: CPT | Mod: PBBFAC,,, | Performed by: STUDENT IN AN ORGANIZED HEALTH CARE EDUCATION/TRAINING PROGRAM

## 2023-04-20 PROCEDURE — 99215 OFFICE O/P EST HI 40 MIN: CPT | Mod: PBBFAC | Performed by: STUDENT IN AN ORGANIZED HEALTH CARE EDUCATION/TRAINING PROGRAM

## 2023-04-20 PROCEDURE — 99999 PR PBB SHADOW E&M-EST. PATIENT-LVL V: ICD-10-PCS | Mod: PBBFAC,,, | Performed by: STUDENT IN AN ORGANIZED HEALTH CARE EDUCATION/TRAINING PROGRAM

## 2023-04-25 RX ORDER — SODIUM CHLORIDE 0.9 % (FLUSH) 0.9 %
10 SYRINGE (ML) INJECTION
Status: CANCELLED | OUTPATIENT
Start: 2023-04-25

## 2023-04-25 RX ORDER — SODIUM, POTASSIUM,MAG SULFATES 17.5-3.13G
1 SOLUTION, RECONSTITUTED, ORAL ORAL 2 TIMES DAILY
Qty: 1 KIT | Refills: 0 | Status: SHIPPED | OUTPATIENT
Start: 2023-04-25 | End: 2023-04-26

## 2023-04-25 RX ORDER — SODIUM CHLORIDE 9 MG/ML
INJECTION, SOLUTION INTRAVENOUS CONTINUOUS
Status: CANCELLED | OUTPATIENT
Start: 2023-04-25

## 2023-05-04 ENCOUNTER — HOSPITAL ENCOUNTER (OUTPATIENT)
Dept: PULMONOLOGY | Facility: HOSPITAL | Age: 68
Discharge: HOME | End: 2023-05-04
Attending: STUDENT IN AN ORGANIZED HEALTH CARE EDUCATION/TRAINING PROGRAM
Payer: MEDICARE

## 2023-05-04 ENCOUNTER — HOSPITAL ENCOUNTER (OUTPATIENT)
Dept: PREADMISSION TESTING | Facility: HOSPITAL | Age: 68
Discharge: HOME | End: 2023-05-04
Attending: STUDENT IN AN ORGANIZED HEALTH CARE EDUCATION/TRAINING PROGRAM
Payer: MEDICARE

## 2023-05-04 VITALS — HEIGHT: 74 IN | WEIGHT: 160 LBS | BODY MASS INDEX: 20.53 KG/M2

## 2023-05-04 DIAGNOSIS — D63.1 ANEMIA IN ESRD (END-STAGE RENAL DISEASE): ICD-10-CM

## 2023-05-04 DIAGNOSIS — Z01.818 PRE-OP TESTING: ICD-10-CM

## 2023-05-04 DIAGNOSIS — N18.6 ANEMIA IN ESRD (END-STAGE RENAL DISEASE): ICD-10-CM

## 2023-05-04 PROCEDURE — 93010 EKG 12-LEAD: ICD-10-PCS | Mod: ,,, | Performed by: INTERNAL MEDICINE

## 2023-05-04 PROCEDURE — 93010 ELECTROCARDIOGRAM REPORT: CPT | Mod: ,,, | Performed by: INTERNAL MEDICINE

## 2023-05-04 PROCEDURE — 93005 ELECTROCARDIOGRAM TRACING: CPT

## 2023-05-04 NOTE — PRE-PROCEDURE INSTRUCTIONS
DR. DUMONT NOTE ON CHART DATED 09/17/202 HE WAS TO FOLLOW UP IN 1 WEEK FOR LAB RESULTS. REVIEWED WITH DR. GONZALEZ-NEEDS CARDIAC CLEARANCE.

## 2023-05-04 NOTE — H&P
[facility] H&P      Consult: Anemia  Consulting Service: Chencho Frank III, MD  Chief Complaint: None        HPI: Pt is a 68 y.o.male with PMH sig for ESRD on HD MWF, and recent anemia who presents for endo evaluation. Last colonoscopy 5 years ago - normal per patient.  Recent admission for Hgb 6.8 with appropriate response after 2 units PRBC.   No personal or family history of colon cancer.  Has daily regular BMs.  Denies melena, rectal bleeding or pain, change in bowel habits or unintended weight loss.  Denies CP, SOB, abdominal pain, nausea, vomiting, fevers, or chills.        PMH:   Past Medical History:   Diagnosis Date    Chronic kidney disease (CKD) 02/10/2020    CKD (chronic kidney disease) stage 3, GFR 30-59 ml/min 10/16/2014    CKD (chronic kidney disease), stage V 01/20/2020    COPD (chronic obstructive pulmonary disease)     COVID-19 01/03/2022    Diabetes mellitus     Dialysis patient     05/03/2023 LAST    Dysphagia 10/2021    Encounter for blood transfusion     Hypertension     Insomnia     Kidney disease     PAD (peripheral artery disease)     Weight loss 10/2021     PSH:   Past Surgical History:   Procedure Laterality Date    BACK SURGERY      KNEE SURGERY Left     TKR    PLACEMENT OF ARTERIOVENOUS GRAFT Left 02/10/2020    Procedure: INSERTION, GRAFT, ARTERIOVENOUS;  Surgeon: Artie Joshi MD;  Location: [facility];  Service: Cardiovascular;  Laterality: Left;    SPINAL FUSION N/A 11/07/2021    Procedure: FUSION, SPINE;  Surgeon: Vadim Lim MD;  Location: [address];  Service: Orthopedics;  Laterality: N/A;  ORIF T12 Fracture with T10-L2 Fusion, T11 and T12 laminectomy  [employer]  SNS: [employer]/SSE[employer]  [place] + pads  Craniotome and M8     Meds: See medication list;  No anticoagulation  ALL: Patient has no known allergies.  FHX: non contributory  SOC:   Social History     Socioeconomic History    Marital status: Single   Tobacco Use    Smoking status:  "Former     Packs/day: 1.00     Years: 20.00     Pack years: 20.00     Types: Cigars, Cigarettes    Smokeless tobacco: Never    Tobacco comments:     Also reports 5-6 yrs of smoking cigars   Substance and Sexual Activity    Alcohol use: No    Drug use: No    Sexual activity: Yes     Partners: Female     ROS: Review of Systems   Constitutional:  Negative for chills, fever, malaise/fatigue and weight loss.   Respiratory:  Negative for cough.    Cardiovascular:  Negative for chest pain.   Gastrointestinal:  Negative for abdominal pain, blood in stool, constipation, diarrhea, heartburn, melena, nausea and vomiting.   All other systems reviewed and are negative.        Physical Exam:  BP (!) 208/88 (BP Location: Right arm, Patient Position: Sitting, BP Method: Medium (Automatic))   Pulse 75   Ht 6' 2" (1.88 m)   Wt 71.7 kg (158 lb 2.9 oz)   SpO2 99%   BMI 20.31 kg/m²   Physical Exam  Constitutional:       General: He is not in acute distress.     Appearance: He is not ill-appearing or toxic-appearing.   Cardiovascular:      Rate and Rhythm: Normal rate and regular rhythm.   Pulmonary:      Effort: Pulmonary effort is normal. No respiratory distress.   Abdominal:      General: There is no distension.      Palpations: Abdomen is soft.      Tenderness: There is no abdominal tenderness. There is no guarding or rebound.   Skin:     General: Skin is warm and dry.      Capillary Refill: Capillary refill takes less than 2 seconds.   Neurological:      Mental Status: He is alert.             A/P: Pt is a 68 y.o.male who presents for routine screening colonoscopy  --To Endo on 5/11 for EGD/colonoscopy  --Procedure in detail explained to patient;  including risks including but not limited to bleeding, infection, damage to surrounding structures, and perforation- patient voiced understanding  --CBC, CMP, CXR, EKG   --Bowel prep given - Suprep  --CLD day before procedure        Rocío Pennington MD  General Surgery "   [phone]

## 2023-05-09 ENCOUNTER — TELEPHONE (OUTPATIENT)
Dept: SURGERY | Facility: CLINIC | Age: 68
End: 2023-05-09
Payer: MEDICARE

## 2023-05-09 NOTE — TELEPHONE ENCOUNTER
Called pt to confirm if he has prep for colonoscopy on Thursday. No answer and unable to leave voicemail.    Date of Surgery Update:  Ovidio Patel was seen and examined. History and physical has been reviewed. The patient has been examined. There have been no significant clinical changes since the last office visit. The patient was counseled at length about the risks of ruby Covid-19 during their perioperative period and any recovery window from their procedure. The patient was made aware that ruby Covid-19  may worsen their prognosis for recovering from their procedure and lend to a higher morbidity and/or mortality risk. All material risks, benefits, and reasonable alternatives including postponing the procedure were discussed. The patient does  wish to proceed with the procedure at this time.         Signed By: Jaz Luis MD     November 4, 2020 12:35 PM

## 2023-05-10 ENCOUNTER — TELEPHONE (OUTPATIENT)
Dept: SURGERY | Facility: CLINIC | Age: 68
End: 2023-05-10
Payer: MEDICARE

## 2023-05-10 NOTE — TELEPHONE ENCOUNTER
Notified pt of cancelled procedure. Calling  office to get him set up for cards clearance. Will call patient with [facility] appt for 5/23@8:20.

## 2023-06-26 ENCOUNTER — TELEPHONE (OUTPATIENT)
Dept: REHABILITATION | Facility: HOSPITAL | Age: 68
End: 2023-06-26
Payer: MEDICARE

## 2023-06-27 ENCOUNTER — TELEPHONE (OUTPATIENT)
Dept: REHABILITATION | Facility: HOSPITAL | Age: 68
End: 2023-06-27
Payer: MEDICARE

## 2023-06-27 ENCOUNTER — CLINICAL SUPPORT (OUTPATIENT)
Dept: REHABILITATION | Facility: HOSPITAL | Age: 68
End: 2023-06-27
Payer: MEDICARE

## 2023-06-27 DIAGNOSIS — M62.81 MUSCLE WEAKNESS (GENERALIZED): ICD-10-CM

## 2023-06-27 DIAGNOSIS — R26.89 OTHER ABNORMALITIES OF GAIT AND MOBILITY: ICD-10-CM

## 2023-06-27 PROCEDURE — 97110 THERAPEUTIC EXERCISES: CPT

## 2023-06-27 PROCEDURE — 97161 PT EVAL LOW COMPLEX 20 MIN: CPT

## 2023-06-27 NOTE — PLAN OF CARE
Physical Therapy Initial Evaluation     Name: Kevin Sanon  [facility] Number: [id number]    Diagnosis:   Encounter Diagnoses   Name Primary?    Muscle weakness (generalized)     Other abnormalities of gait and mobility      Physician: Chencho Frank III, MD  Treatment Orders: PT Eval and Treat  Past Medical History:   Diagnosis Date    Chronic kidney disease (CKD) 02/10/2020    CKD (chronic kidney disease) stage 3, GFR 30-59 ml/min 10/16/2014    CKD (chronic kidney disease), stage V 01/20/2020    COPD (chronic obstructive pulmonary disease)     COVID-19 01/03/2022    Diabetes mellitus     Dialysis patient     05/03/2023 LAST    Dysphagia 10/2021    Encounter for blood transfusion     Hypertension     Insomnia     PAD (peripheral artery disease)     Weight loss 10/2021     Current Outpatient Medications   Medication Sig    amLODIPine (NORVASC) 10 MG tablet Take 1 tablet (10 mg total) by mouth once daily.    atorvastatin (LIPITOR) 40 MG tablet Take 1 tablet (40 mg total) by mouth once daily.    carvediloL (COREG) 6.25 MG tablet Take 2 tablets (12.5 mg total) by mouth 2 (two) times daily with meals.    cyanocobalamin, vitamin B-12, 1,000 mcg Subl Place 1 tablet under the tongue once daily. (Patient not taking: Reported on 5/4/2023)    HYDROcodone-acetaminophen (NORCO)  mg per tablet Take 1 tablet by mouth every 12 (twelve) hours as needed for Pain.    linaGLIPtin (TRADJENTA) 5 mg Tab tablet Take 1 tablet (5 mg total) by mouth once daily.    nebivoloL (BYSTOLIC) 10 MG Tab Take 10 mg by mouth.    olmesartan (BENICAR) 40 MG tablet Take 40 mg by mouth.    polyethylene glycol (GLYCOLAX) 17 gram PwPk Take 17 g by mouth once daily. (Patient not taking: Reported on 6/8/2023)    zolpidem (AMBIEN) 10 mg Tab TAKE 1 TABLET BY MOUTH EVERY DAY AT BEDTIME AS NEEDED     No current facility-administered medications for this visit.     Facility-Administered Medications Ordered in  Other Visits   Medication    0.9%  NaCl infusion    lidocaine (PF) 10 mg/ml (1%) injection 10 mg    ondansetron injection 4 mg     Review of patient's allergies indicates:  No Known Allergies    SUBJECTIVE     Patient states:  history of a fall a few months ago when he fell in early AM coming back from bathroom not using LBQC. Patient reports back fracture over a year ago and (B) LE weakness (L) greater than (R). Patient reports he sometimes walks around home without LBQC. Patient lives with wife who performs most of household tasks and cooking. Patient able to perform self care activities. Patient has 3 steps to enter home with hand rail on (R) side. Patient does not work. Patient reports he spent some time in inpatient rehab after his back fracture and was given some LE exercises to do at home but he has not been consistent with performance.   Pts goals:  to ambulate without cane.   Pain Scale: no pain reported at this time.     OBJECTIVE     Mental status: alert  Posture Alignment: slouched posture seated and forward lean in standing position  Sensation: Light Touch: Intact         ROM:  LOWER EXTREMITY -- AROM/PROM  (R) LE: WFLs  (L) LE: WFLs    FLEXIBILITY:increased hamstring tightness (B) (L) greater than (R)    Lower Extremity Strength  Right LE  Left LE    Hip Flexion: 4/5 Hip Flexion: 4-/5   Hip Abduction: 4/5 Hip Abduction: 4-/5   Hip IR: 4/5 Hip IR: 4-/5   Hip ER: 4/5 Hip ER: 4-/5   Hip Extension: 4/5 Hip Extension: 4-/5   Hip flexion: 4/5 Hip flexion: 4-/5   Knee extension: 4/5 Knee extension: 4-/5   Knee flexion: 4/5 Knee flexion: 4-/5   Ankle dorsiflexion:   4/5 Ankle dorsiflexion:   4-/5   Ankle plantarflexion: 4/5 Ankle plantarflexion: 4-/5     GAIT: Kevin ambulates household and community distances with LBQC with independently.     GAIT DEVIATIONS: Kevin displays antalgic gait pattern, forward trunk flexion, increased ELAINE, decreased step length, decreased heel strike, decreased weight shift,  decreased time spent in SLS, lateral path deviation, decreased foot clearance, decreased gait speed, unsteady gait with changing directions requiring extra caution.      Pt/family was provided educational information, including: role of PT, goals for PT, scheduling - pt verbalized understanding. Discussed insurance limitations with pt.     Exercises were reviewed and pt was able to demonstrate them prior to the end of the session. Pt received a written copy of exercises to perform at home.  Pt has no cultural, educational or language barriers to learning provided.    FOTO: 44 see  for complete details  TU.46s  5x sit to stand: 40.17s    TREATMENT     Time In: 1330  Time Out: 1400    PT Evaluation Completed? Yes  Discussed Plan of Care with patient: Yes  Written Home Exercises Provided: Please see  for details.   Kevin demo good understanding of the education provided. Patient demo good return demo of skill of exercises.    ASSESSMENT   Pt prognosis is Excellent.  Pt will benefit from skilled outpatient physical therapy to address the above stated deficits, provide pt/family education and to maximize pt's level of independence.     Medical necessity is demonstrated by the following IMPAIRMENTS/PROBLEMS:  1. Decreased strength  2. Decreased postural awareness  3. Increased gait deviations  4. Decreased tolerance of functional activities  5. Decreased flexibility    Pt's spiritual, cultural and educational needs considered and pt agreeable to plan of care and goals as stated below:     Anticipated Barriers for physical therapy:     Short Term GOALS: 3 weeks. Pt agrees with goals set.  Patient demonstrates modified [place] with HEP.  Patient demonstrates postural awareness with all activities.   Patient reports no falls during functional activities.    Long Term GOALS: 6  weeks. Pt agrees with goals set.  Patient demonstrates improvement in functional activity tolerance as evidenced  by FOTO score of 56 or greater.  Patient demonstrates improvement in TUG to 15s or less.  Patient demonstrates improvement in 5x sit to stand test to 20s or less.   Patient demonstrates (B) LE strength improvement by at least 1/2 grade.  Patient ambulates with fewer gait deviations with least restrictive device.  Goals PRN.       PLAN     Outpatient physical therapy 2 times weekly to include: pt ed, hep, therapeutic exercises, neuromuscular re-education/ balance exercises, manual therapy and modalities prn including but not limited to electrical stimulation, ultrasound, MHP, CP. Cont PT for  6 weeks. Pt may be seen by PTA as part of the rehabilitation team.   Certification dates: 6/27/2023-8/4/2023    Therapist: Bernie Williamson, PT       MD Certification     [] I certify that I have reviewed this PT Evaluation   and I am in agreement with the Plan of Care.     MD:     Date:

## 2023-06-28 ENCOUNTER — TELEPHONE (OUTPATIENT)
Dept: REHABILITATION | Facility: HOSPITAL | Age: 68
End: 2023-06-28
Payer: MEDICARE

## 2023-07-06 ENCOUNTER — CLINICAL SUPPORT (OUTPATIENT)
Dept: REHABILITATION | Facility: HOSPITAL | Age: 68
End: 2023-07-06
Payer: MEDICARE

## 2023-07-06 ENCOUNTER — TELEPHONE (OUTPATIENT)
Dept: REHABILITATION | Facility: HOSPITAL | Age: 68
End: 2023-07-06
Payer: MEDICARE

## 2023-07-06 DIAGNOSIS — M62.81 MUSCLE WEAKNESS (GENERALIZED): Primary | ICD-10-CM

## 2023-07-06 DIAGNOSIS — R26.89 OTHER ABNORMALITIES OF GAIT AND MOBILITY: ICD-10-CM

## 2023-07-06 PROCEDURE — 97110 THERAPEUTIC EXERCISES: CPT

## 2023-07-06 PROCEDURE — 97116 GAIT TRAINING THERAPY: CPT

## 2023-07-06 PROCEDURE — 97112 NEUROMUSCULAR REEDUCATION: CPT

## 2023-07-06 NOTE — PROGRESS NOTES
[facility]   Physical Therapy Treatment Note      Name: Kevin Sanon  Clinic Number: [id number]    Therapy Diagnosis:   Encounter Diagnoses   Name Primary?    Muscle weakness (generalized) Yes    Other abnormalities of gait and mobility      Physician: Chencho Frank III, MD    Visit Date: 7/6/2023      Visit Number:1/12  Time in: 1105  Time out: 1200    Subjective     Pt reports: he  is looking forward to working hard with physical therapy.   He was compliant with home exercise program.      Pain: 0/10      Objective      Objective Measures updated at progress report unless specified.     Treatment     Kevin received the treatments listed below:      therapeutic exercises, Neuromuscular re-education, therapeutic activities  3x10 SLR 3 lbs  3x10 bridging  3x30 seconds tandem bilaterally   3x30 seconds zero position   2x10 LAQ 2.5 lbs on R LE and no weight on L LE   3x10 seated hip abduction with green Tband        gait training to improve functional mobility and safety:  Increased step length and improved ELAINE with improved knee flexion throughout LR Bilaterally         Patient Education and Home Exercises       Education provided:   - patient educated on gait pattern and proper mobility with transitional mobility in order to avoid compensatory mechanisms.     Written Home Exercises Provided: yes. Exercises were reviewed and Kevin was able to demonstrate them prior to the end of the session.  Kevin demonstrated good  understanding of the education provided. See EMR under Patient Instructions for exercises provided during therapy sessions    Assessment     Patient presents with no pain this visit, and he reports he is feeling good. He is able to perform all functional treatment well this visit with no pain noted, but significant fatigue and weakness are noted in B LE. He is able to perform tandem stance and zero stance well with significant fatigue noted and CGA required for safety. He  is able to perform NMR and there-ex well with cuing required for proper form.     Kevin Is progressing well towards his goals.   Pt prognosis is Excellent.     Pt will continue to benefit from skilled outpatient physical therapy to address the deficits listed in the problem list box on initial evaluation, provide pt/family education and to maximize pt's level of independence in the home and community environment.     Pt's spiritual, cultural and educational needs considered and pt agreeable to plan of care and goals.         Goals:     Short Term GOALS: 3 weeks. Pt agrees with goals set.  Patient demonstrates modified [place] with HEP.  Patient demonstrates postural awareness with all activities.   Patient reports no falls during functional activities.     Long Term GOALS: 6  weeks. Pt agrees with goals set.  Patient demonstrates improvement in functional activity tolerance as evidenced by FOTO score of 56 or greater.  Patient demonstrates improvement in TUG to 15s or less.  Patient demonstrates improvement in 5x sit to stand test to 20s or less.   Patient demonstrates (B) LE strength improvement by at least 1/2 grade.  Patient ambulates with fewer gait deviations with least restrictive device.  Goals PRN.     Plan     Patient will continue with current POC and progress as tolerated.     Glen Morrell, PT, DPT, MTC

## 2023-07-10 ENCOUNTER — TELEPHONE (OUTPATIENT)
Dept: REHABILITATION | Facility: HOSPITAL | Age: 68
End: 2023-07-10
Payer: MEDICARE

## 2023-07-11 ENCOUNTER — CLINICAL SUPPORT (OUTPATIENT)
Dept: REHABILITATION | Facility: HOSPITAL | Age: 68
End: 2023-07-11
Payer: MEDICARE

## 2023-07-11 DIAGNOSIS — M62.81 MUSCLE WEAKNESS (GENERALIZED): Primary | ICD-10-CM

## 2023-07-11 DIAGNOSIS — R26.89 OTHER ABNORMALITIES OF GAIT AND MOBILITY: ICD-10-CM

## 2023-07-11 PROCEDURE — 97112 NEUROMUSCULAR REEDUCATION: CPT

## 2023-07-11 PROCEDURE — 97110 THERAPEUTIC EXERCISES: CPT

## 2023-07-11 NOTE — PROGRESS NOTES
[facility]   Physical Therapy Treatment Note      Name: Kevin Sanon  Clinic Number: [id number]    Therapy Diagnosis:   Encounter Diagnoses   Name Primary?    Muscle weakness (generalized) Yes    Other abnormalities of gait and mobility      Physician: Chencho Frank III, MD    Visit Date: 7/11/2023      Visit Number:2/12  Time in: 0906  Time out: 1006    Subjective     Pt reports: LBP reported today.   He was compliant with home exercise program.      Pain: 8/10      Objective      Objective Measures updated at progress report unless specified.     Treatment     Kevin received the treatments listed below:    All BOLD exercises performed today:   therapeutic exercises, Neuromuscular re-education, therapeutic activities  3x10 SLR 2#  3x10 bridging  3x30 seconds tandem bilaterally   3x30 seconds zero position   3x10 LAQ 2# (B)  3x10 seated hip abduction with green Tband  +10' LE Bicycle no resistance  +3x10 2# (B) marching supine      gait training to improve functional mobility and safety: none      Patient Education and Home Exercises       Education provided: patient educated on continuance of performance of HEP and body mechanics with generalized mobility.     Written Home Exercises Provided: yes. Exercises were reviewed and Kevin was able to demonstrate them prior to the end of the session.  Kevin demonstrated good  understanding of the education provided. See EMR under Patient Instructions for exercises provided during therapy sessions    Assessment     Patient with decreased pain symptoms following treatment. PT working with patient on lumbar stabilization and LE strengthening exercises. PT will continue to progress per current POC and incorporate standing balance exercises as appropriate.     Kvein Is progressing well towards his goals.   Pt prognosis is Excellent.     Pt will continue to benefit from skilled outpatient physical therapy to address the deficits listed in the  problem list box on initial evaluation, provide pt/family education and to maximize pt's level of independence in the home and community environment.     Pt's spiritual, cultural and educational needs considered and pt agreeable to plan of care and goals.         Goals:     Short Term GOALS: 3 weeks. Pt agrees with goals set.  Patient demonstrates modified [place] with HEP.  Patient demonstrates postural awareness with all activities.   Patient reports no falls during functional activities.     Long Term GOALS: 6  weeks. Pt agrees with goals set.  Patient demonstrates improvement in functional activity tolerance as evidenced by FOTO score of 56 or greater.  Patient demonstrates improvement in TUG to 15s or less.  Patient demonstrates improvement in 5x sit to stand test to 20s or less.   Patient demonstrates (B) LE strength improvement by at least 1/2 grade.  Patient ambulates with fewer gait deviations with least restrictive device.  Goals PRN.     Plan     Patient will continue with current POC and progress as tolerated.     Bernie Williamson, PT

## 2023-07-13 ENCOUNTER — CLINICAL SUPPORT (OUTPATIENT)
Dept: REHABILITATION | Facility: HOSPITAL | Age: 68
End: 2023-07-13
Payer: MEDICARE

## 2023-07-13 DIAGNOSIS — R26.89 OTHER ABNORMALITIES OF GAIT AND MOBILITY: ICD-10-CM

## 2023-07-13 DIAGNOSIS — M62.81 MUSCLE WEAKNESS (GENERALIZED): Primary | ICD-10-CM

## 2023-07-13 PROCEDURE — 97110 THERAPEUTIC EXERCISES: CPT

## 2023-07-13 PROCEDURE — 97140 MANUAL THERAPY 1/> REGIONS: CPT

## 2023-07-13 NOTE — PROGRESS NOTES
[facility]   Physical Therapy Treatment Note      Name: Kevin Sanon  Clinic Number: [id number]    Therapy Diagnosis:   Encounter Diagnoses   Name Primary?    Muscle weakness (generalized) Yes    Other abnormalities of gait and mobility      Physician: Chencho Frank III, MD    Visit Date: 7/13/2023    Visit Number:3/12  Time in: 0908  Time out: 1007    Subjective     Pt reports: LBP reported today. Patient not compliant with HEP.     Pain: 8/10    Objective      Objective Measures updated at progress report unless specified.     Treatment     Kevin received the treatments listed below:    All BOLD exercises performed today:   therapeutic exercises, Neuromuscular re-education, therapeutic activities:  3x10 SLR 2#  3x10 bridging  3x30 seconds tandem bilaterally   3x30 seconds zero position   3x10 LAQ 2# (B)  2x10 supine clams RTB  3x10 seated hip abduction with green Tband  Omnicycle 10 minutes Neuro 5 (L) 45 (R) 55  3x10 2# (B) marching supine  +2x10 calf raises (B)  +2x10 toe taps (B)  +2x10 hip flexion (B)    Manual Therapy: STM to (B) thoracolumbar paraspinal muscles using effleurage and petrissage    gait training to improve functional mobility and safety: none      Patient Education and Home Exercises       Education provided: patient educated on continuance of performance of HEP and body mechanics with generalized mobility.     Written Home Exercises Provided: yes. Exercises were reviewed and Kevin was able to demonstrate them prior to the end of the session.  Kevin demonstrated good  understanding of the education provided. See EMR under Patient Instructions for exercises provided during therapy sessions    Assessment     Patient responded well to manual therapy today. Patient also responding well to exercises. Decreased pain reported after treatment. Patient reports increased stiffness in back with mobility. Patient ambulating with improved gait after treatment.     Kevin Is  progressing well towards his goals.   Pt prognosis is Excellent.     Pt will continue to benefit from skilled outpatient physical therapy to address the deficits listed in the problem list box on initial evaluation, provide pt/family education and to maximize pt's level of independence in the home and community environment.     Pt's spiritual, cultural and educational needs considered and pt agreeable to plan of care and goals.         Goals:     Short Term GOALS: 3 weeks. Pt agrees with goals set.  Patient demonstrates modified [place] with HEP.  Patient demonstrates postural awareness with all activities.   Patient reports no falls during functional activities.     Long Term GOALS: 6  weeks. Pt agrees with goals set.  Patient demonstrates improvement in functional activity tolerance as evidenced by FOTO score of 56 or greater.  Patient demonstrates improvement in TUG to 15s or less.  Patient demonstrates improvement in 5x sit to stand test to 20s or less.   Patient demonstrates (B) LE strength improvement by at least 1/2 grade.  Patient ambulates with fewer gait deviations with least restrictive device.  Goals PRN.     Plan     Patient will continue with current POC and progress as tolerated.     Outpatient physical therapy 2 times weekly to include: pt ed, hep, therapeutic exercises, neuromuscular re-education/ balance exercises, manual therapy and modalities prn including but not limited to electrical stimulation, ultrasound, MHP, CP. Cont PT for  6 weeks. Pt may be seen by PTA as part of the rehabilitation team.   Certification dates: 6/27/2023-8/4/2023    Bernie Williamson PT

## 2023-07-17 ENCOUNTER — TELEPHONE (OUTPATIENT)
Dept: REHABILITATION | Facility: HOSPITAL | Age: 68
End: 2023-07-17
Payer: MEDICARE

## 2023-07-20 ENCOUNTER — CLINICAL SUPPORT (OUTPATIENT)
Dept: REHABILITATION | Facility: HOSPITAL | Age: 68
End: 2023-07-20
Payer: MEDICARE

## 2023-07-20 DIAGNOSIS — M62.81 MUSCLE WEAKNESS (GENERALIZED): Primary | ICD-10-CM

## 2023-07-20 PROCEDURE — 97110 THERAPEUTIC EXERCISES: CPT

## 2023-07-20 PROCEDURE — 97112 NEUROMUSCULAR REEDUCATION: CPT

## 2023-07-20 NOTE — PROGRESS NOTES
[facility]   Physical Therapy Treatment Note      Name: Kevin Sanon  Clinic Number: [id number]    Therapy Diagnosis:   Encounter Diagnosis   Name Primary?    Muscle weakness (generalized) Yes     Physician: Chencho Frank III, MD    Visit Date: 7/20/2023    Visit Number:4/12  Time in: 0916  Time out: 1005    Subjective     Pt reports: LBP reported today. Patient not compliant with HEP. Patient does report improvement in flexibility.     Pain: 8/10    Objective      Objective Measures updated at progress report unless specified.     Treatment     Kevin received the treatments listed below:    All BOLD exercises performed today:   therapeutic exercises, Neuromuscular re-education, therapeutic activities:  3x10 SLR   3x10 bridging  3x30 seconds tandem bilaterally   3x30 seconds zero position   3x10 LAQ 2# (B)  3x10 supine clams GTB  3x10 seated hip abduction with green Tband  Omnicycle 10 minutes Neuro 5 (L) 48 (R) 52  3x10 2# (B) marching supine  +2x10 calf raises (B)  +2x10 toe taps (B)  +2x10 hip flexion (B)    Manual Therapy: none today  Supervised Modalities: MHP to thoracolumber musculature during supine exercises to facilitate muscular mobility and pain relief.   gait training to improve functional mobility and safety: none      Patient Education and Home Exercises       Education provided: patient educated on continuance of performance of HEP and body mechanics with generalized mobility.     Written Home Exercises Provided: yes. Exercises were reviewed and Kevin was able to demonstrate them prior to the end of the session.  Kevin demonstrated good  understanding of the education provided. See EMR under Patient Instructions for exercises provided during therapy sessions    Assessment     Patient responded appropriately to therapy treatment today. Therapy treatment today abbreviated due to patient late for appt and PT unable to extend patient session due to evaluation following  his treatment. Patient strongly encouraged to perform HEP to attempt to achieve long term results with therapy treatment. PT will progress patient treatment and make adjustments as appropriate.     Kevin Is progressing well towards his goals.   Pt prognosis is Excellent.     Pt will continue to benefit from skilled outpatient physical therapy to address the deficits listed in the problem list box on initial evaluation, provide pt/family education and to maximize pt's level of independence in the home and community environment.     Pt's spiritual, cultural and educational needs considered and pt agreeable to plan of care and goals.         Goals:     Short Term GOALS: 3 weeks. Pt agrees with goals set.  Patient demonstrates modified [place] with HEP.  Patient demonstrates postural awareness with all activities.   Patient reports no falls during functional activities.     Long Term GOALS: 6  weeks. Pt agrees with goals set.  Patient demonstrates improvement in functional activity tolerance as evidenced by FOTO score of 56 or greater.  Patient demonstrates improvement in TUG to 15s or less.  Patient demonstrates improvement in 5x sit to stand test to 20s or less.   Patient demonstrates (B) LE strength improvement by at least 1/2 grade.  Patient ambulates with fewer gait deviations with least restrictive device.  Goals PRN.     Plan     Patient will continue with current POC and progress as tolerated.     Outpatient physical therapy 2 times weekly to include: pt ed, hep, therapeutic exercises, neuromuscular re-education/ balance exercises, manual therapy and modalities prn including but not limited to electrical stimulation, ultrasound, MHP, CP. Cont PT for  6 weeks. Pt may be seen by PTA as part of the rehabilitation team.   Certification dates: 6/27/2023-8/4/2023    Bernie Williamson PT

## 2023-07-25 ENCOUNTER — CLINICAL SUPPORT (OUTPATIENT)
Dept: REHABILITATION | Facility: HOSPITAL | Age: 68
End: 2023-07-25
Payer: MEDICARE

## 2023-07-25 DIAGNOSIS — R26.89 OTHER ABNORMALITIES OF GAIT AND MOBILITY: ICD-10-CM

## 2023-07-25 DIAGNOSIS — M62.81 MUSCLE WEAKNESS (GENERALIZED): Primary | ICD-10-CM

## 2023-07-25 PROCEDURE — 97112 NEUROMUSCULAR REEDUCATION: CPT

## 2023-07-25 PROCEDURE — 97530 THERAPEUTIC ACTIVITIES: CPT

## 2023-07-25 PROCEDURE — 97110 THERAPEUTIC EXERCISES: CPT

## 2023-07-25 NOTE — PROGRESS NOTES
"[facility]   Physical Therapy Treatment Note      Name: Kevin Sanon  Clinic Number: [id number]    Therapy Diagnosis:   Encounter Diagnoses   Name Primary?    Muscle weakness (generalized) Yes    Other abnormalities of gait and mobility        Physician: Chencho Frank III, MD    Visit Date: 7/25/2023    Visit Number:5/12  Time in: 0915  Time out: 1010    Subjective     Pt reports: LBP reported today. Patient reports decreased intensity, duration, and frequency of back pain.     Pain:6/10    Objective      Objective Measures updated at progress report unless specified.     Treatment     Kevin received the treatments listed below:    All BOLD exercises performed today:   therapeutic exercises, Neuromuscular re-education, therapeutic activities:  3x10 TA bracing  3x10 SLR   3x10 bridging  +4x30" seated HSS with belt  3x30 seconds tandem bilaterally   3x30 seconds zero position   3x10 LAQ 2# (B)  3x10 supine clams GTB  3x10 seated hip abduction with green Tband  Omnicycle 10 minutes Neuro 5 (L) 48 (R) 52  3x10 2# (B) marching supine  3x10 calf raises (B)  3x10 toe taps (B)  3x10 hip flexion/abduction/extension (B)    Manual Therapy: none today  Supervised Modalities: MHP to thoracolumber musculature during supine exercises to facilitate muscular mobility and pain relief.   gait training to improve functional mobility and safety: none      Patient Education and Home Exercises       Education provided: patient educated on continuance of performance of HEP and body mechanics with generalized mobility.     Written Home Exercises Provided: yes. Exercises were reviewed and Kevin was able to demonstrate them prior to the end of the session.  Kevin demonstrated good  understanding of the education provided. See EMR under Patient Instructions for exercises provided during therapy sessions    Assessment     Patient responded appropriately to therapy treatment today. Patient with improved postural " alignment during standing exercises/activities. PT feels patient could continue to benefit from PT intervention  to progress towards achievement of goals. Re-assess next visit    Kevin Is progressing well towards his goals.   Pt prognosis is Excellent.     Pt will continue to benefit from skilled outpatient physical therapy to address the deficits listed in the problem list box on initial evaluation, provide pt/family education and to maximize pt's level of independence in the home and community environment.     Pt's spiritual, cultural and educational needs considered and pt agreeable to plan of care and goals.         Goals:     Short Term GOALS: 3 weeks. Pt agrees with goals set.  Patient demonstrates modified [place] with HEP.  Patient demonstrates postural awareness with all activities.   Patient reports no falls during functional activities.     Long Term GOALS: 6  weeks. Pt agrees with goals set.  Patient demonstrates improvement in functional activity tolerance as evidenced by FOTO score of 56 or greater.  Patient demonstrates improvement in TUG to 15s or less.  Patient demonstrates improvement in 5x sit to stand test to 20s or less.   Patient demonstrates (B) LE strength improvement by at least 1/2 grade.  Patient ambulates with fewer gait deviations with least restrictive device.  Goals PRN.     Plan     Patient will continue with current POC and progress as tolerated. Re-assess next visit.     Outpatient physical therapy 2 times weekly to include: pt ed, hep, therapeutic exercises, neuromuscular re-education/ balance exercises, manual therapy and modalities prn including but not limited to electrical stimulation, ultrasound, MHP, CP. Cont PT for  6 weeks. Pt may be seen by PTA as part of the rehabilitation team.   Certification dates: 6/27/2023-8/4/2023    Bernie Williamson PT

## 2023-07-27 ENCOUNTER — CLINICAL SUPPORT (OUTPATIENT)
Dept: REHABILITATION | Facility: HOSPITAL | Age: 68
End: 2023-07-27
Payer: MEDICARE

## 2023-07-27 DIAGNOSIS — M62.81 MUSCLE WEAKNESS (GENERALIZED): Primary | ICD-10-CM

## 2023-07-27 PROCEDURE — 97110 THERAPEUTIC EXERCISES: CPT

## 2023-07-27 PROCEDURE — 97112 NEUROMUSCULAR REEDUCATION: CPT

## 2023-07-27 NOTE — PROGRESS NOTES
"[facility]   Physical Therapy Treatment Note      Name: Kevin Sanon  Clinic Number: [id number]    Therapy Diagnosis:   Encounter Diagnosis   Name Primary?    Muscle weakness (generalized) Yes     Physician: Chencho Frank III, MD    Visit Date: 7/27/2023    Visit Number:6/12  Time in: 0915  Time out: 1015    Subjective     Pt reports: LBP reported today. Patient reports decreased intensity, duration, and frequency of back pain. Patient reports doing HEP 1x per day.     Pain:6/10    Objective      Objective Measures updated at progress report unless specified.     Treatment     Kevin received the treatments listed below:    All BOLD exercises performed today:   therapeutic exercises, Neuromuscular re-education, therapeutic activities:  3x10 TA bracing  3x10 SLR   3x10 bridging with Add  +4x30" seated HSS with belt  3x30 seconds tandem bilaterally   3x30 seconds zero position   3x10 LAQ (B)  3x10 supine clams BTB  3x10 seated hip abduction with green Tband  Omnicycle 10 minutes Neuro 4 (L) 45 (R) 55  3x10 2# (B) marching supine  2x10 calf raises (B)  3x10 toe taps (B)  3x10 hip flexion/abduction/extension (B)  +3x10 supine RSB rolls  +3x30" (B) tandem stance with gait belt    Manual Therapy: none today  Supervised Modalities: none today  gait training to improve functional mobility and safety: none      Patient Education and Home Exercises       Education provided: patient educated on continuance of performance of HEP and body mechanics with generalized mobility.     Written Home Exercises Provided: yes. Exercises were reviewed and Kevin was able to demonstrate them prior to the end of the session.  Kevin demonstrated good  understanding of the education provided. See EMR under Patient Instructions for exercises provided during therapy sessions    Assessment     Patient responded appropriately to therapy treatment today. Patient with improved postural alignment during standing " exercises/activities. PT feels patient could continue to benefit from PT intervention  to progress towards achievement of goals. Re-assess next visit    Kevin Is progressing well towards his goals.   Pt prognosis is Excellent.     Pt will continue to benefit from skilled outpatient physical therapy to address the deficits listed in the problem list box on initial evaluation, provide pt/family education and to maximize pt's level of independence in the home and community environment.     Pt's spiritual, cultural and educational needs considered and pt agreeable to plan of care and goals.         Goals:     Short Term GOALS: 3 weeks. Pt agrees with goals set.  Patient demonstrates modified [place] with HEP. Met CB 7/27/2023  Patient demonstrates postural awareness with all activities. Met CB 7/27/2023  Patient reports no falls during functional activities.Met  7/27/2023     Long Term GOALS: 6  weeks. Pt agrees with goals set.  Patient demonstrates improvement in functional activity tolerance as evidenced by FOTO score of 56 or greater.  Patient demonstrates improvement in TUG to 15s or less.  Patient demonstrates improvement in 5x sit to stand test to 20s or less.   Patient demonstrates (B) LE strength improvement by at least 1/2 grade.  Patient ambulates with fewer gait deviations with least restrictive device.Met  7/27/2023  Goals PRN.     Plan     Patient will continue with current POC and progress as tolerated. TUG, FOTO and STS test will be administered next visit.     Outpatient physical therapy 2 times weekly to include: pt ed, hep, therapeutic exercises, neuromuscular re-education/ balance exercises, manual therapy and modalities prn including but not limited to electrical stimulation, ultrasound, MHP, CP. Cont PT for  6 weeks. Pt may be seen by PTA as part of the rehabilitation team.   Certification dates: 6/27/2023-8/4/2023    Bernie Williamson PT

## 2023-08-03 ENCOUNTER — CLINICAL SUPPORT (OUTPATIENT)
Dept: REHABILITATION | Facility: HOSPITAL | Age: 68
End: 2023-08-03
Payer: MEDICARE

## 2023-08-03 DIAGNOSIS — R26.89 OTHER ABNORMALITIES OF GAIT AND MOBILITY: ICD-10-CM

## 2023-08-03 DIAGNOSIS — M62.81 MUSCLE WEAKNESS (GENERALIZED): Primary | ICD-10-CM

## 2023-08-03 PROCEDURE — 97140 MANUAL THERAPY 1/> REGIONS: CPT

## 2023-08-03 PROCEDURE — 97110 THERAPEUTIC EXERCISES: CPT

## 2023-08-03 PROCEDURE — 97112 NEUROMUSCULAR REEDUCATION: CPT

## 2023-08-03 NOTE — PROGRESS NOTES
"[facility]   Physical Therapy Treatment Note/Re-assessment     Name: Kevin Sanon  Clinic Number: [id number]    Therapy Diagnosis:   Encounter Diagnoses   Name Primary?    Muscle weakness (generalized) Yes    Other abnormalities of gait and mobility      Physician: Chencho Frank III, MD    Visit Date: 8/3/2023    Visit Number:7/12  Time in: 0914  Time out: 1029    Subjective     Pt reports: LBP reported today.  Patient reports he fell a few days ago and some increased LBP    Pain:8/10    Objective      Objective Measures updated at progress report unless specified.     Treatment     Kevin received the treatments listed below:    All BOLD exercises performed today:   therapeutic exercises, Neuromuscular re-education, therapeutic activities:  3x10 TA bracing  3x10 SLR   3x10 bridging with Add  4x30" supine HSS with belt (B)  4x30" Piriformis stretch (B)  3x30 seconds tandem bilaterally   3x30 seconds zero position   3x10 LAQ (B)  3x10 supine clams BTB  3x10 seated hip abduction with green Tband  Omnicycle 10 minutes Neuro 4 (L) 45 (R) 55  3x10 2# (B) marching supine  2x10 calf raises (B)  3x10 toe taps (B)  3x10 hip flexion/abduction/extension (B)  +3x10 supine RSB rolls  +3x30" (B) tandem stance with gait belt    Manual Therapy: STM to lumbar musculature x10 minutes with focus on trigger points and also effleurage and pétrissage to improve tissue mobility and pain relief.    Supervised Modalities: MHP on back during supine exercises for increased pain relief and tissue mobility  gait training to improve functional mobility and safety: none      Patient Education and Home Exercises       Education provided: patient educated on continuance of performance of HEP and body mechanics with generalized mobility.     Written Home Exercises Provided: yes. Exercises were reviewed and Kevin was able to demonstrate them prior to the end of the session.  Kevin demonstrated good  understanding of " the education provided. See EMR under Patient Instructions for exercises provided during therapy sessions    Assessment     FOTO: 60 see  for details and POC  TU.36s no cane  5x sit to stand: 32.4s    Patient responded appropriately to therapy treatment today. Decreased pain following treatment.  Patient with improved postural alignment during standing exercises/activities.Patient with improvement in tissue mobility this visit compared to last time STM performed. PT feels patient could continue to benefit from PT intervention  to progress towards achievement of goals. PT feels patient could benefit from extension of current POC to complete visits within authorization period. Patient with improvement in tissue mobility this visit compared to last time STM performed.      Pt prognosis is Excellent.     Pt will continue to benefit from skilled outpatient physical therapy to address the deficits listed in the problem list box on initial evaluation, provide pt/family education and to maximize pt's level of independence in the home and community environment.     Pt's spiritual, cultural and educational needs considered and pt agreeable to plan of care and goals.     Goals:     Short Term GOALS: 3 weeks. Pt agrees with goals set.  Patient demonstrates modified [place] with HEP. Met CB 2023  Patient demonstrates postural awareness with all activities. Met CB 2023  Patient reports no falls during functional activities.Met CB 2023     Long Term GOALS: 6  weeks. Pt agrees with goals set.  Patient demonstrates improvement in functional activity tolerance as evidenced by FOTO score of 56 or greater.Met CB 2023  Patient demonstrates improvement in TUG to 15s or less.Progress CB 2023  Patient demonstrates improvement in 5x sit to stand test to 20s or less. Progress CB 2023  Patient demonstrates (B) LE strength improvement by at least 1/2 grade.Progress CB 2023  Patient  ambulates with fewer gait deviations with least restrictive device.Met CB 7/27/2023  Goals PRN.     Plan     PT requesting extension of current POC to complete initial authorized visits.   Outpatient physical therapy 2 times weekly to include: pt ed, hep, therapeutic exercises, neuromuscular re-education/ balance exercises, manual therapy and modalities prn including but not limited to electrical stimulation, ultrasound, MHP, CP. Cont PT for  3 weeks. Pt may be seen by PTA as part of the rehabilitation team.   Current Certification dates: 6/27/2023-8/4/2023  New Certification dates: 6/27/2023-8/25/2023    Bernie Williamson, PT       MD Certification     [] I certify that I have reviewed this PT Re-assessment  and I am in agreement with the Plan of Care.     MD:     Date:

## 2023-08-08 ENCOUNTER — CLINICAL SUPPORT (OUTPATIENT)
Dept: REHABILITATION | Facility: HOSPITAL | Age: 68
End: 2023-08-08
Payer: MEDICARE

## 2023-08-08 ENCOUNTER — TELEPHONE (OUTPATIENT)
Dept: REHABILITATION | Facility: HOSPITAL | Age: 68
End: 2023-08-08
Payer: MEDICARE

## 2023-08-08 DIAGNOSIS — M62.81 MUSCLE WEAKNESS (GENERALIZED): Primary | ICD-10-CM

## 2023-08-08 PROCEDURE — 97110 THERAPEUTIC EXERCISES: CPT

## 2023-08-08 PROCEDURE — 97530 THERAPEUTIC ACTIVITIES: CPT

## 2023-08-08 NOTE — PROGRESS NOTES
"[facility]   Physical Therapy Treatment Note     Name: Kevin Sanon  Clinic Number: [id number]    Therapy Diagnosis:   Encounter Diagnosis   Name Primary?    Muscle weakness (generalized) Yes     Physician: Chencho Frank III, MD    Visit Date: 8/8/2023    Visit Number:8/12  Time in: 0912  Time out: 1002    Subjective     Pt reports: LBP reported today.  No other new complaints    Pain: 6/10    Objective      Objective Measures updated at progress report unless specified.     Treatment     Kevin received the treatments listed below:    All BOLD exercises performed today:   therapeutic exercises, Neuromuscular re-education, therapeutic activities:    LE bike 10 minutes no resistance  3x10 TA bracing  3x10 SLR   3x10 bridging with Add  4x30" supine HSS with belt (B)  4x30" Piriformis stretch (B)  3x30 seconds tandem bilaterally   3x30 seconds zero position   3x10 LAQ (B)  3x10 supine clams RTB  3x10 seated hip abduction with green Tband  Omnicycle 10 minutes Neuro 4 (L) 45 (R) 55  3x10 2# (B) marching supine  2x10 calf raises (B)  3x10 toe taps (B)  3x10 hip flexion/abduction/extension (B)  3x10 supine RSB rolls flexion/rotation  +3x30" (B) tandem stance with gait belt    Manual Therapy: STM to lumbar musculature x0 minutes with focus on trigger points and also effleurage and pétrissage to improve tissue mobility and pain relief.    Supervised Modalities: MHP on back during supine exercises for increased pain relief and tissue mobility  gait training to improve functional mobility and safety: none      Patient Education and Home Exercises       Education provided: patient educated on continuance of performance of HEP and body mechanics with generalized mobility.     Written Home Exercises Provided: yes. Exercises were reviewed and Kevin was able to demonstrate them prior to the end of the session.  Kevin demonstrated good  understanding of the education provided. See EMR under Patient " Instructions for exercises provided during therapy sessions    Assessment     Patient tolerated treatment well. PT will continue to adapt and progress patient treatment as appropriate to work towards achievement of goals. Patient continues with antalgic gait pattern. Patient late for appt and unable to complete all scheduled exercises today.   Pt prognosis is Excellent.     Pt will continue to benefit from skilled outpatient physical therapy to address the deficits listed in the problem list box on initial evaluation, provide pt/family education and to maximize pt's level of independence in the home and community environment.     Pt's spiritual, cultural and educational needs considered and pt agreeable to plan of care and goals.     Goals:     Short Term GOALS: 3 weeks. Pt agrees with goals set.  Patient demonstrates modified [place] with HEP. Met CB 7/27/2023  Patient demonstrates postural awareness with all activities. Met CB 7/27/2023  Patient reports no falls during functional activities.Met  7/27/2023     Long Term GOALS: 6  weeks. Pt agrees with goals set.  Patient demonstrates improvement in functional activity tolerance as evidenced by FOTO score of 56 or greater.Met CB 7/27/2023  Patient demonstrates improvement in TUG to 15s or less.Progress CB 7/27/2023  Patient demonstrates improvement in 5x sit to stand test to 20s or less. Progress CB 7/27/2023  Patient demonstrates (B) LE strength improvement by at least 1/2 grade.Progress CB 7/27/2023  Patient ambulates with fewer gait deviations with least restrictive device.Met  7/27/2023  Goals PRN.     Plan     PT requesting extension of current POC to complete initial authorized visits.   Outpatient physical therapy 2 times weekly to include: pt ed, hep, therapeutic exercises, neuromuscular re-education/ balance exercises, manual therapy and modalities prn including but not limited to electrical stimulation, ultrasound, MHP, CP. Cont PT for  3 weeks.  Pt may be seen by PTA as part of the rehabilitation team.   Current Certification dates: 6/27/2023-8/4/2023  New Certification dates: 6/27/2023-8/25/2023    Bernie Williamson PT

## 2023-08-10 ENCOUNTER — CLINICAL SUPPORT (OUTPATIENT)
Dept: REHABILITATION | Facility: HOSPITAL | Age: 68
End: 2023-08-10
Payer: MEDICARE

## 2023-08-10 DIAGNOSIS — M62.81 MUSCLE WEAKNESS (GENERALIZED): Primary | ICD-10-CM

## 2023-08-10 PROCEDURE — 97140 MANUAL THERAPY 1/> REGIONS: CPT

## 2023-08-10 PROCEDURE — 97112 NEUROMUSCULAR REEDUCATION: CPT

## 2023-08-10 PROCEDURE — 97110 THERAPEUTIC EXERCISES: CPT

## 2023-08-10 NOTE — PROGRESS NOTES
"[facility]   Physical Therapy Treatment Note     Name: Kevin Sanon  Clinic Number: [id number]    Therapy Diagnosis:   Encounter Diagnosis   Name Primary?    Muscle weakness (generalized) Yes     Physician: Chencho Frank III, MD    Visit Date: 8/10/2023    Visit Number: 9/12  Time in: 0915  Time out: 1015    Subjective     Pt reports: LBP reported today.  No other new complaints    Pain: 6-7/10    Objective      Objective Measures updated at progress report unless specified.     Treatment     Kevin received the treatments listed below:    All BOLD exercises performed today:   therapeutic exercises, Neuromuscular re-education, therapeutic activities:    LE bike 10 minutes no resistance  3x10 TA bracing  3x10 SLR 2.5#  3x10 bridging with Add  4x30" supine HSS with belt (B)  4x30" Piriformis stretch (B)  3x30 seconds tandem bilaterally   3x30 seconds zero position   3x10 LAQ (B) 2.5#  3x10 supine clams RTB  3x10 seated hip abduction with green Tband  Omnicycle 10 minutes Neuro 4 (L) 45 (R) 55  3x10 2.5# (B) marching supine  2x10 calf raises (B)  3x10 toe taps (B)  3x10 hip flexion/abduction/extension (B)  3x10 supine RSB rolls flexion/rotation  +3x30" (B) tandem stance with gait belt    Manual Therapy: STM to lumbar musculature x10 minutes with focus on trigger points and also effleurage and pétrissage to improve tissue mobility and pain relief.    Supervised Modalities: MHP on back during supine exercises for increased pain relief and tissue mobility  gait training to improve functional mobility and safety: none      Patient Education and Home Exercises       Education provided: patient educated on continuance of performance of HEP and body mechanics with generalized mobility.     Written Home Exercises Provided: yes. Exercises were reviewed and Kevin was able to demonstrate them prior to the end of the session.  Kevin demonstrated good  understanding of the education provided. See " EMR under Patient Instructions for exercises provided during therapy sessions    Assessment     Patient tolerated treatment well. PT will continue to adapt and progress patient treatment as appropriate to work towards achievement of goals. Patient continues to have LBP that fluctuates in intensity but is consistently between a 6-8/10. Patient with decreased antalgic gait pattern noted with use of cane. Patient demonstrating decreased guarding with transitional movement in clinic.     Pt prognosis is Excellent.     Pt will continue to benefit from skilled outpatient physical therapy to address the deficits listed in the problem list box on initial evaluation, provide pt/family education and to maximize pt's level of independence in the home and community environment.     Pt's spiritual, cultural and educational needs considered and pt agreeable to plan of care and goals.     Goals:     Short Term GOALS: 3 weeks. Pt agrees with goals set.  Patient demonstrates modified [place] with HEP. Met CB 7/27/2023  Patient demonstrates postural awareness with all activities. Met CB 7/27/2023  Patient reports no falls during functional activities.Met  7/27/2023     Long Term GOALS: 6  weeks. Pt agrees with goals set.  Patient demonstrates improvement in functional activity tolerance as evidenced by FOTO score of 56 or greater.Met CB 7/27/2023  Patient demonstrates improvement in TUG to 15s or less.Progress CB 7/27/2023  Patient demonstrates improvement in 5x sit to stand test to 20s or less. Progress CB 7/27/2023  Patient demonstrates (B) LE strength improvement by at least 1/2 grade.Progress  7/27/2023  Patient ambulates with fewer gait deviations with least restrictive device.Met  7/27/2023  Goals PRN.     Plan     Continue current POC.   Outpatient physical therapy 2 times weekly to include: pt ed, hep, therapeutic exercises, neuromuscular re-education/ balance exercises, manual therapy and modalities prn including  but not limited to electrical stimulation, ultrasound, MHP, CP. Cont PT for  3 weeks. Pt may be seen by PTA as part of the rehabilitation team.   Current Certification dates: 6/27/2023-8/4/2023  New Certification dates: 6/27/2023-8/25/2023    Bernie Williamson PT

## 2023-08-15 ENCOUNTER — CLINICAL SUPPORT (OUTPATIENT)
Dept: REHABILITATION | Facility: HOSPITAL | Age: 68
End: 2023-08-15
Payer: MEDICARE

## 2023-08-15 DIAGNOSIS — R26.89 OTHER ABNORMALITIES OF GAIT AND MOBILITY: ICD-10-CM

## 2023-08-15 DIAGNOSIS — M62.81 MUSCLE WEAKNESS (GENERALIZED): Primary | ICD-10-CM

## 2023-08-15 PROCEDURE — 97140 MANUAL THERAPY 1/> REGIONS: CPT

## 2023-08-15 PROCEDURE — 97112 NEUROMUSCULAR REEDUCATION: CPT

## 2023-08-15 NOTE — PROGRESS NOTES
"[facility]   Physical Therapy Treatment Note     Name: Kevin Sanon  Clinic Number: [id number]    Therapy Diagnosis:   Encounter Diagnoses   Name Primary?    Muscle weakness (generalized) Yes    Other abnormalities of gait and mobility      Physician: Chencho Frank III, MD    Visit Date: 8/15/2023    Visit Number: 10/12  Time in: 0915  Time out: 1015    Subjective     Pt reports: LBP reported today.  No other new complaints    Pain: 6-7/10 prior to treatment and 3-4/10 following treatment.     Objective      Objective Measures updated at progress report unless specified.     Treatment     Kevin received the treatments listed below:    All BOLD exercises performed today:   therapeutic exercises, Neuromuscular re-education, therapeutic activities:    LE bike 15 minutes no resistance  3x10 TA bracing  3x10 glut squeezes  3x10 SLR 2.5#  3x10 bridging with Add  4x30" supine HSS with belt (B)  4x30" Piriformis stretch (B)  3x30 seconds tandem bilaterally   3x30 seconds zero position   3x10 LAQ (B) 2.5#  3x10 supine clams RTB  3x10 seated hip abduction with green Tband  Omnicycle 10 minutes Neuro 4 (L) 45 (R) 55  3x10 2.5# (B) marching supine  3x10 calf raises (B)  3x10 toe taps (B)  3x10 hip flexion(marching)/abduction (B)  3x10 supine RSB rolls flexion/rotation  +3x30" (B) tandem stance with gait belt    Manual Therapy: STM to lumbar musculature x10 minutes with focus on trigger points with also effleurage and pétrissage to improve tissue mobility and pain relief.    Supervised Modalities: MHP on back during supine exercises for increased pain relief and tissue mobility  gait training to improve functional mobility and safety: none      Patient Education and Home Exercises       Education provided: patient educated on continuance of performance of HEP and body mechanics with generalized mobility.     Written Home Exercises Provided: yes. Exercises were reviewed and Kevin was able to " demonstrate them prior to the end of the session.  Kevin demonstrated good  understanding of the education provided. See EMR under Patient Instructions for exercises provided during therapy sessions    Assessment     Patient tolerated treatment well. Patient with temporary relief of pain symptoms after PT treatment and overall decline in frequency, duration and intensity of pain symptoms since beginning therapy intervention. PT will see through end of current POC.     Pt prognosis is Excellent.     Pt will continue to benefit from skilled outpatient physical therapy to address the deficits listed in the problem list box on initial evaluation, provide pt/family education and to maximize pt's level of independence in the home and community environment.     Pt's spiritual, cultural and educational needs considered and pt agreeable to plan of care and goals.     Goals:     Short Term GOALS: 3 weeks. Pt agrees with goals set.  Patient demonstrates modified [place] with HEP. Met CB 7/27/2023  Patient demonstrates postural awareness with all activities. Met CB 7/27/2023  Patient reports no falls during functional activities.Met CB 7/27/2023     Long Term GOALS: 6  weeks. Pt agrees with goals set.  Patient demonstrates improvement in functional activity tolerance as evidenced by FOTO score of 56 or greater.Met CB 7/27/2023  Patient demonstrates improvement in TUG to 15s or less.Progress CB 7/27/2023  Patient demonstrates improvement in 5x sit to stand test to 20s or less. Progress CB 7/27/2023  Patient demonstrates (B) LE strength improvement by at least 1/2 grade.Progress CB 7/27/2023  Patient ambulates with fewer gait deviations with least restrictive device.Met CB 7/27/2023  Goals PRN.     Plan     Continue current POC.   Outpatient physical therapy 2 times weekly to include: pt ed, hep, therapeutic exercises, neuromuscular re-education/ balance exercises, manual therapy and modalities prn including but not limited  to electrical stimulation, ultrasound, MHP, CP. Cont PT for  3 weeks. Pt may be seen by PTA as part of the rehabilitation team.   Current Certification dates: 6/27/2023-8/4/2023  New Certification dates: 6/27/2023-8/25/2023    Bernie Williamson PT

## 2023-08-17 ENCOUNTER — CLINICAL SUPPORT (OUTPATIENT)
Dept: REHABILITATION | Facility: HOSPITAL | Age: 68
End: 2023-08-17
Payer: MEDICARE

## 2023-08-17 DIAGNOSIS — M62.81 MUSCLE WEAKNESS (GENERALIZED): Primary | ICD-10-CM

## 2023-08-17 PROCEDURE — 97140 MANUAL THERAPY 1/> REGIONS: CPT

## 2023-08-17 NOTE — PROGRESS NOTES
"[facility]   Physical Therapy Treatment Note     Name: Kevin Sanon  Clinic Number: [id number]    Therapy Diagnosis:   Encounter Diagnosis   Name Primary?    Muscle weakness (generalized) Yes     Physician: Chencho Frank III, MD    Visit Date: 8/17/2023    Visit Number: 11/12  Time in: 0917  Time out: 0950    Subjective     Pt reports: LBP reported today.  Patient requests abbreviated treatment due to having to leave early to go to appt in [place]. Patient late for appt.     Pain: 7/10 prior to treatment     Objective      Objective Measures updated at progress report unless specified.     Treatment     Kevin received the treatments listed below:    All BOLD exercises performed today:   therapeutic exercises, Neuromuscular re-education, therapeutic activities:    LE bike 15 minutes no resistance with MHP on back  3x10 TA bracing  3x10 glut squeezes  3x10 SLR 2.5#  3x10 bridging with Add  4x30" supine HSS with belt (B)  4x30" Piriformis stretch (B)  3x30 seconds tandem bilaterally   3x30 seconds zero position   3x10 LAQ (B) 2.5#  3x10 supine clams RTB  3x10 seated hip abduction with green Tband  Omnicycle 10 minutes Neuro 4 (L) 45 (R) 55  3x10 2.5# (B) marching supine  3x10 calf raises (B)  3x10 toe taps (B)  3x10 hip flexion(marching)/abduction (B)  3x10 supine RSB rolls flexion/rotation  +3x30" (B) tandem stance with gait belt    Manual Therapy: STM to thoracolumbar musculature x10 minutes with focus on trigger points with also effleurage and pétrissage to improve tissue mobility and pain relief.    Supervised Modalities: MHP on back during bicycle exercises for increased pain relief and tissue mobility  gait training to improve functional mobility and safety: none      Patient Education and Home Exercises       Education provided: patient educated on continuance of performance of HEP and body mechanics with generalized mobility.     Written Home Exercises Provided: yes. " Exercises were reviewed and Kevin was able to demonstrate them prior to the end of the session.  Kevin demonstrated good  understanding of the education provided. See EMR under Patient Instructions for exercises provided during therapy sessions    Assessment     Patient tolerated treatment well. Patient with temporary relief of pain symptoms. Patient encouraged to attend appts on time for optimal therapeutic results. PT will re-assess next visit.     Pt prognosis is Excellent.     Pt will continue to benefit from skilled outpatient physical therapy to address the deficits listed in the problem list box on initial evaluation, provide pt/family education and to maximize pt's level of independence in the home and community environment.     Pt's spiritual, cultural and educational needs considered and pt agreeable to plan of care and goals.     Goals:     Short Term GOALS: 3 weeks. Pt agrees with goals set.  Patient demonstrates modified [place] with HEP. Met CB 7/27/2023  Patient demonstrates postural awareness with all activities. Met CB 7/27/2023  Patient reports no falls during functional activities.Met CB 7/27/2023     Long Term GOALS: 6  weeks. Pt agrees with goals set.  Patient demonstrates improvement in functional activity tolerance as evidenced by FOTO score of 56 or greater.Met CB 7/27/2023  Patient demonstrates improvement in TUG to 15s or less.Progress CB 7/27/2023  Patient demonstrates improvement in 5x sit to stand test to 20s or less. Progress CB 7/27/2023  Patient demonstrates (B) LE strength improvement by at least 1/2 grade.Progress CB 7/27/2023  Patient ambulates with fewer gait deviations with least restrictive device.Met  7/27/2023  Goals PRN.     Plan     Re-assessment next visit.   Outpatient physical therapy 2 times weekly to include: pt ed, hep, therapeutic exercises, neuromuscular re-education/ balance exercises, manual therapy and modalities prn including but not limited to electrical  stimulation, ultrasound, MHP, CP. Cont PT for  3 weeks. Pt may be seen by PTA as part of the rehabilitation team.   Current Certification dates: 6/27/2023-8/4/2023  New Certification dates: 6/27/2023-8/25/2023    Bernie Williamson, PT

## 2023-08-22 ENCOUNTER — CLINICAL SUPPORT (OUTPATIENT)
Dept: REHABILITATION | Facility: HOSPITAL | Age: 68
End: 2023-08-22
Payer: MEDICARE

## 2023-08-22 DIAGNOSIS — R26.89 OTHER ABNORMALITIES OF GAIT AND MOBILITY: ICD-10-CM

## 2023-08-22 DIAGNOSIS — M62.81 MUSCLE WEAKNESS (GENERALIZED): Primary | ICD-10-CM

## 2023-08-22 PROCEDURE — 97112 NEUROMUSCULAR REEDUCATION: CPT

## 2023-08-22 PROCEDURE — 97530 THERAPEUTIC ACTIVITIES: CPT

## 2023-08-22 NOTE — PROGRESS NOTES
"[facility]   Physical Therapy Treatment Note/Discharge Summary     Name: Kevin Sanon  Clinic Number: [id number]    Therapy Diagnosis:   Encounter Diagnoses   Name Primary?    Muscle weakness (generalized) Yes    Other abnormalities of gait and mobility      Physician: Chencho Frank III, MD    Visit Date: 2023    Visit Number:   Time in: 27  Time out: 1002    Subjective     Pt reports: LBP reported today.  Patient reports trying to schedule follow up with surgeon. Patient reports improvement in overall strength but continued complaints of back pain symptoms.   Pain: 8/10 prior to treatment     Objective      Objective Measures updated at progress report unless specified.     Treatment     Kevin received the treatments listed below:    All BOLD exercises performed today:   therapeutic exercises, Neuromuscular re-education, therapeutic activities:    LE bike 10 minutes no resistance   2x10 TA bracing  2x10 LTR  3x10 glut squeezes  3x10 SLR 2.5#  3x10 bridging with Add  4x30" supine HSS with belt (B)  4x30" Piriformis stretch (B)  3x30 seconds tandem bilaterally   3x30 seconds zero position   3x10 LAQ (B) 2.5#  3x10 supine clams RTB  3x10 seated hip abduction with green Tband  Omnicycle 10 minutes Neuro 4 (L) 45 (R) 55  3x10 2.5# (B) marching supine  3x10 calf raises (B)  3x10 toe taps (B)  3x10 hip flexion(marching)/abduction (B)  3x10 supine RSB rolls flexion  +3x30" (B) tandem stance with gait belt    Manual Therapy: none   Supervised Modalities: MHP on back during  supine exercises for increased pain relief and tissue mobility  gait training to improve functional mobility and safety: none  FOTO: 57 (2023) and 60 (8/3/2023)  see  for details and POC  TU.88s no cane  5x sit to stand: 27.3s     Lower Extremity Strength  Right LE   Left LE     Hip Flexion: 4+/5 Hip Flexion: 4/5   Hip Abduction: 4+/5 Hip Abduction: 4/5   Hip IR: 4+/5 Hip IR: 4/5   Hip " ER: 4+/5 Hip ER: 4/5   Hip Extension: 4+/5 Hip Extension: 4/5   Hip flexion: 4+/5 Hip flexion: 4/5   Knee extension: 4+/5 Knee extension: 4/5   Knee flexion: 4+/5 Knee flexion: 4/5   Ankle dorsiflexion:   4+/5 Ankle dorsiflexion: 4/5   Ankle plantarflexion: 4+/5 Ankle plantarflexion: 4/5        Patient Education and Home Exercises       Education provided: patient educated on continuance of performance of HEP and body mechanics with generalized mobility.     Written Home Exercises Provided: yes. Exercises were reviewed and Kevin was able to demonstrate them prior to the end of the session.  Kevin demonstrated good  understanding of the education provided. See EMR under Patient Instructions for exercises provided during therapy sessions    Assessment     Patient tolerated treatment well. Patient with temporary relief of pain symptoms. Patient has made progress with FOTO score, LE strength, as well as TUG and 5x sit to stand and PT feels patient could be discharged from PT intervention at this time. Patient will follow up with physician due to continued back pain.     Pt prognosis is Excellent.     Pt will continue to benefit from skilled outpatient physical therapy to address the deficits listed in the problem list box on initial evaluation, provide pt/family education and to maximize pt's level of independence in the home and community environment.     Pt's spiritual, cultural and educational needs considered and pt agreeable to plan of care and goals.     Goals:     Short Term GOALS: 3 weeks. Pt agrees with goals set.  Patient demonstrates modified [place] with HEP. Met CB 7/27/2023  Patient demonstrates postural awareness with all activities. Met CB 7/27/2023  Patient reports no falls during functional activities.Met CB 7/27/2023     Long Term GOALS: 6  weeks. Pt agrees with goals set.  Patient demonstrates improvement in functional activity tolerance as evidenced by FOTO score of 56 or greater.Met CB  7/27/2023  Patient demonstrates improvement in TUG to 15s or less.Progress CB 8/22/2023 Progress CB 7/27/2023  Patient demonstrates improvement in 5x sit to stand test to 20s or less. Progress CB 8/22/2023 Progress CB 7/27/2023  Patient demonstrates (B) LE strength improvement by at least 1/2 grade.Met CB 8/22/2023 Progress CB 7/27/2023  Patient ambulates with fewer gait deviations with least restrictive device.Met CB 7/27/2023  Goals PRN.     Plan     Discharge from PT at this time.  Outpatient physical therapy 2 times weekly to include: pt ed, hep, therapeutic exercises, neuromuscular re-education/ balance exercises, manual therapy and modalities prn including but not limited to electrical stimulation, ultrasound, MHP, CP. Cont PT for  3 weeks. Pt may be seen by PTA as part of the rehabilitation team.   Current Certification dates: 6/27/2023-8/4/2023  New Certification dates: 6/27/2023-8/25/2023    Bernei Williamson, PT

## 2023-10-04 ENCOUNTER — PATIENT MESSAGE (OUTPATIENT)
Dept: ADMINISTRATIVE | Facility: OTHER | Age: 68
End: 2023-10-04
Payer: MEDICARE

## 2023-10-11 ENCOUNTER — HOSPITAL ENCOUNTER (EMERGENCY)
Facility: HOSPITAL | Age: 68
Discharge: HOME | End: 2023-10-11
Attending: EMERGENCY MEDICINE
Payer: MEDICARE

## 2023-10-11 VITALS
DIASTOLIC BLOOD PRESSURE: 82 MMHG | HEIGHT: 74 IN | OXYGEN SATURATION: 100 % | TEMPERATURE: 98 F | BODY MASS INDEX: 20.53 KG/M2 | SYSTOLIC BLOOD PRESSURE: 178 MMHG | WEIGHT: 160 LBS | HEART RATE: 71 BPM | RESPIRATION RATE: 18 BRPM

## 2023-10-11 DIAGNOSIS — N18.6 END STAGE RENAL DISEASE ON DIALYSIS: ICD-10-CM

## 2023-10-11 DIAGNOSIS — R53.1 GENERALIZED WEAKNESS: Primary | ICD-10-CM

## 2023-10-11 DIAGNOSIS — K59.00 CONSTIPATION, UNSPECIFIED CONSTIPATION TYPE: ICD-10-CM

## 2023-10-11 DIAGNOSIS — Z99.2 END STAGE RENAL DISEASE ON DIALYSIS: ICD-10-CM

## 2023-10-11 LAB
ALBUMIN SERPL BCP-MCNC: 3.1 G/DL (ref 3.5–5.2)
ALP SERPL-CCNC: 71 U/L (ref 55–135)
ALT SERPL W/O P-5'-P-CCNC: 13 U/L (ref 10–44)
ANION GAP SERPL CALC-SCNC: 3 MMOL/L (ref 3–11)
AST SERPL-CCNC: 14 U/L (ref 10–40)
BASOPHILS # BLD AUTO: 0.01 K/UL (ref 0–0.2)
BASOPHILS NFR BLD: 0.3 % (ref 0–1.9)
BILIRUB SERPL-MCNC: 0.6 MG/DL (ref 0.1–1)
BUN SERPL-MCNC: 29 MG/DL (ref 8–23)
CALCIUM SERPL-MCNC: 8.4 MG/DL (ref 8.7–10.5)
CHLORIDE SERPL-SCNC: 98 MMOL/L (ref 95–110)
CO2 SERPL-SCNC: 34 MMOL/L (ref 23–29)
CREAT SERPL-MCNC: 9 MG/DL (ref 0.5–1.4)
CTP QC/QA: YES
DIFFERENTIAL METHOD: ABNORMAL
EOSINOPHIL # BLD AUTO: 0 K/UL (ref 0–0.5)
EOSINOPHIL NFR BLD: 1.1 % (ref 0–8)
ERYTHROCYTE [DISTWIDTH] IN BLOOD BY AUTOMATED COUNT: 14 % (ref 11.5–14.5)
EST. GFR  (NO RACE VARIABLE): 5.9 ML/MIN/1.73 M^2
GLUCOSE SERPL-MCNC: 90 MG/DL (ref 70–110)
HCT VFR BLD AUTO: 29.4 % (ref 40–54)
HGB BLD-MCNC: 9.8 G/DL (ref 14–18)
IMM GRANULOCYTES # BLD AUTO: 0.02 K/UL (ref 0–0.04)
IMM GRANULOCYTES NFR BLD AUTO: 0.5 % (ref 0–0.5)
LYMPHOCYTES # BLD AUTO: 0.6 K/UL (ref 1–4.8)
LYMPHOCYTES NFR BLD: 15.4 % (ref 18–48)
MCH RBC QN AUTO: 33.7 PG (ref 27–31)
MCHC RBC AUTO-ENTMCNC: 33.3 G/DL (ref 32–36)
MCV RBC AUTO: 101 FL (ref 82–98)
MONOCYTES # BLD AUTO: 0.3 K/UL (ref 0.3–1)
MONOCYTES NFR BLD: 7.6 % (ref 4–15)
NEUTROPHILS # BLD AUTO: 2.8 K/UL (ref 1.8–7.7)
NEUTROPHILS NFR BLD: 75.1 % (ref 38–73)
NRBC BLD-RTO: 0 /100 WBC
PLATELET # BLD AUTO: 97 K/UL (ref 150–450)
PMV BLD AUTO: 11 FL (ref 9.2–12.9)
POTASSIUM SERPL-SCNC: 4.5 MMOL/L (ref 3.5–5.1)
PROT SERPL-MCNC: 7.1 G/DL (ref 6–8.4)
RBC # BLD AUTO: 2.91 M/UL (ref 4.6–6.2)
SARS-COV-2 RDRP RESP QL NAA+PROBE: NEGATIVE
SODIUM SERPL-SCNC: 135 MMOL/L (ref 136–145)
WBC # BLD AUTO: 3.7 K/UL (ref 3.9–12.7)

## 2023-10-11 PROCEDURE — 87635 SARS-COV-2 COVID-19 AMP PRB: CPT | Performed by: EMERGENCY MEDICINE

## 2023-10-11 PROCEDURE — 36415 COLL VENOUS BLD VENIPUNCTURE: CPT | Performed by: EMERGENCY MEDICINE

## 2023-10-11 PROCEDURE — 80053 COMPREHEN METABOLIC PANEL: CPT | Performed by: EMERGENCY MEDICINE

## 2023-10-11 PROCEDURE — 25000003 PHARM REV CODE 250: Performed by: EMERGENCY MEDICINE

## 2023-10-11 PROCEDURE — 85025 COMPLETE CBC W/AUTO DIFF WBC: CPT | Performed by: EMERGENCY MEDICINE

## 2023-10-11 PROCEDURE — 99283 EMERGENCY DEPT VISIT LOW MDM: CPT

## 2023-10-11 RX ORDER — LACTULOSE 10 G/15ML
30 SOLUTION ORAL 3 TIMES DAILY PRN
Qty: 300 ML | Refills: 0 | Status: SHIPPED | OUTPATIENT
Start: 2023-10-11

## 2023-10-11 RX ORDER — HYDRALAZINE HYDROCHLORIDE 25 MG/1
100 TABLET, FILM COATED ORAL
Status: COMPLETED | OUTPATIENT
Start: 2023-10-11 | End: 2023-10-11

## 2023-10-11 RX ADMIN — HYDRALAZINE HYDROCHLORIDE 100 MG: 25 TABLET ORAL at 09:10

## 2023-10-11 NOTE — ED PROVIDER NOTES
Encounter Date: 10/11/2023       History     Chief Complaint   Patient presents with    Weakness     Pt to ED via EMS - stated that for the past 2-3 days has been experiencing generalized weakness/constipation progressing to nausea / vomiting today. Denied chest pain / dyspnea. (Dialysis scheduled for this morning - MWF)     68-year-old male with a history of end-stage renal disease on dialysis, Monday Wednesday Friday dialysis patient, has dialysis this morning.  In the emergency department with generalized weakness and constipation for the past 2-3 days.  Afebrile.  No shortness of breath.  Alert oriented x4, GCS is 15.  He is not ill appearing, speaking in full sentences without issue.  Denies any abdominal pain.  History of this in the past      Review of patient's allergies indicates:  No Known Allergies  Past Medical History:   Diagnosis Date    Chronic kidney disease (CKD) 02/10/2020    CKD (chronic kidney disease) stage 3, GFR 30-59 ml/min 10/16/2014    CKD (chronic kidney disease), stage V 01/20/2020    COPD (chronic obstructive pulmonary disease)     COVID-19 01/03/2022    Diabetes mellitus     Dialysis patient     05/03/2023 LAST    Dysphagia 10/2021    Encounter for blood transfusion     Hypertension     Insomnia     PAD (peripheral artery disease)     Weight loss 10/2021     Past Surgical History:   Procedure Laterality Date    BACK SURGERY      KNEE SURGERY Left     TKR    PLACEMENT OF ARTERIOVENOUS GRAFT Left 02/10/2020    Procedure: INSERTION, GRAFT, ARTERIOVENOUS;  Surgeon: Artie Joshi MD;  Location: [facility];  Service: Cardiovascular;  Laterality: Left;    SPINAL FUSION N/A 11/07/2021    Procedure: FUSION, SPINE;  Surgeon: Vadim Lim MD;  Location: [address];  Service: Orthopedics;  Laterality: N/A;  ORIF T12 Fracture with T10-L2 Fusion, T11 and T12 laminectomy  Arian  SNS: Motors/SSEP  New mali + pads  Craniotome and M8     Family History   Problem Relation Age of Onset     Diabetes Mother 83    No Known Problems Father     No Known Problems Sister     No Known Problems Brother     No Known Problems Brother     No Known Problems Brother     No Known Problems Brother      Social History     Tobacco Use    Smoking status: Former     Current packs/day: 1.00     Average packs/day: 1 pack/day for 20.0 years (20.0 ttl pk-yrs)     Types: Cigars, Cigarettes    Smokeless tobacco: Never    Tobacco comments:     Also reports 5-6 yrs of smoking cigars   Substance Use Topics    Alcohol use: No    Drug use: No     Review of Systems   Constitutional:  Negative for fever.   HENT:  Negative for sore throat.    Respiratory:  Negative for shortness of breath.    Cardiovascular:  Negative for chest pain.   Gastrointestinal:  Positive for constipation. Negative for nausea.   Genitourinary:  Negative for dysuria.   Musculoskeletal:  Negative for back pain.   Skin:  Negative for rash.   Neurological:  Negative for weakness.   Hematological:  Does not bruise/bleed easily.   All other systems reviewed and are negative.      Physical Exam     Initial Vitals   BP Pulse Resp Temp SpO2   10/11/23 0925 10/11/23 0922 10/11/23 0922 10/11/23 0922 10/11/23 0922   (!) 226/90 64 16 97.8 °F (36.6 °C) 100 %      MAP       --                Physical Exam    Nursing note and vitals reviewed.  Constitutional: He appears well-developed and well-nourished. He is not diaphoretic. No distress.   HENT:   Head: Normocephalic and atraumatic.   Mouth/Throat: Oropharynx is clear and moist.   Eyes: Conjunctivae and EOM are normal. Pupils are equal, round, and reactive to light. Right eye exhibits no discharge. Left eye exhibits no discharge. No scleral icterus.   Neck: Neck supple. No JVD present.   Normal range of motion.  Cardiovascular:  Normal rate, regular rhythm, normal heart sounds and intact distal pulses.           No murmur heard.  Pulmonary/Chest: Breath sounds normal. No stridor. No respiratory distress. He has no wheezes.  He has no rhonchi. He has no rales. He exhibits no tenderness.   Abdominal: Abdomen is soft. Bowel sounds are normal. He exhibits no distension and no mass. There is no abdominal tenderness. There is no rebound and no guarding.   Musculoskeletal:         General: No tenderness or edema. Normal range of motion.      Cervical back: Normal range of motion and neck supple.     Neurological: He is alert and oriented to person, place, and time. He has normal strength. GCS score is 15. GCS eye subscore is 4. GCS verbal subscore is 5. GCS motor subscore is 6.   Skin: Skin is warm and dry. Capillary refill takes less than 2 seconds.         ED Course   Procedures  Labs Reviewed   CBC W/ AUTO DIFFERENTIAL - Abnormal; Notable for the following components:       Result Value    WBC 3.70 (*)     RBC 2.91 (*)     Hemoglobin 9.8 (*)     Hematocrit 29.4 (*)      (*)     MCH 33.7 (*)     Platelets 97 (*)     Lymph # 0.6 (*)     Gran % 75.1 (*)     Lymph % 15.4 (*)     All other components within normal limits   COMPREHENSIVE METABOLIC PANEL - Abnormal; Notable for the following components:    Sodium 135 (*)     CO2 34 (*)     BUN 29 (*)     Creatinine 9.0 (*)     Calcium 8.4 (*)     Albumin 3.1 (*)     eGFR 5.9 (*)     All other components within normal limits   SARS-COV-2 RDRP GENE    Narrative:     .This test utilizes isothermal nucleic acid amplification technology to detect the SARS-CoV-2 RdRp nucleic acid segment. The analytical sensitivity (limit of detection) is 500 copies/swab.     A POSITIVE result is indicative of the presence of SARS-CoV-2 RNA; clinical correlation with patient history and other diagnostic information is necessary to determine patient infection status.    A NEGATIVE result means that SARS-CoV-2 nucleic acids are not present above the limit of detection. A NEGATIVE result should be treated as presumptive. It does not rule out the possibility of COVID-19 and should not be the sole basis for treatment  decisions. If COVID-19 is strongly suspected based on clinical and exposure history, re-testing using an alternate molecular assay should be considered.     This test is only for use under the Food and Drug Administration s Emergency Use Authorization (EUA).     Commercial kits are provided by [employer]. Performance characteristics of the EUA have been independently verified by [facility] Department of Pathology and Laboratory Medicine.   _________________________________________________________________   The authorized Fact Sheet for Healthcare Providers and the authorized Fact Sheet for Patients of the ID NOW COVID-19 are available on the FDA website:    https://www.fda.gov/media/052618/download      https://www.fda.gov/media/393846/download              Imaging Results    None          Medications   hydrALAZINE tablet 100 mg (100 mg Oral Given 10/11/23 0946)     Medical Decision Making  Amount and/or Complexity of Data Reviewed  Labs: ordered.    Risk  Prescription drug management.               ED Course as of 10/11/23 0955   Wed Oct 11, 2023   0937 Hemoglobin hematocrit is at baseline [SD]   0952 Generalized weakness, laboratory values are unremarkable, he has dialysis this morning.  Stable for Discharge and follow up [SD]      ED Course User Index  [SD] Chris Santa MD               Medical Decision Making:   Differential Diagnosis:   Weakness, COVID-19, constipation, anemia      Clinical Impression:   Final diagnoses:  [R53.1] Generalized weakness (Primary)  [K59.00] Constipation, unspecified constipation type  [N18.6, Z99.2] End stage renal disease on dialysis        ED Disposition Condition    Discharge Stable          ED Prescriptions       Medication Sig Dispense Start Date End Date Auth. Provider    lactulose (CHRONULAC) 20 gram/30 mL Soln Take 45 mLs (30 g total) by mouth 3 (three) times daily as needed (Constipation). 300 mL 10/11/2023 -- Chris Santa MD           Follow-up Information       Follow up With Specialties Details Why Contact Info Additional Information    Primary care physician  In 2 days       [facility] Emergency Department Emergency Medicine  As needed [address] [facility] [id number] [phone] Floor 1             Chris Santa MD  10/11/23 0925

## 2023-11-13 ENCOUNTER — HOSPITAL ENCOUNTER (EMERGENCY)
Facility: HOSPITAL | Age: 68
Discharge: HOME | End: 2023-11-13
Attending: EMERGENCY MEDICINE
Payer: MEDICARE

## 2023-11-13 VITALS
OXYGEN SATURATION: 97 % | SYSTOLIC BLOOD PRESSURE: 189 MMHG | RESPIRATION RATE: 20 BRPM | DIASTOLIC BLOOD PRESSURE: 78 MMHG | BODY MASS INDEX: 21.82 KG/M2 | HEART RATE: 70 BPM | TEMPERATURE: 99 F | WEIGHT: 170 LBS | HEIGHT: 74 IN
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
## 2023-11-13 DIAGNOSIS — R07.89 CHEST WALL PAIN: ICD-10-CM

## 2023-11-13 PROCEDURE — 99284 EMERGENCY DEPT VISIT MOD MDM: CPT | Mod: 25

## 2023-11-13 PROCEDURE — 25000003 PHARM REV CODE 250: Performed by: EMERGENCY MEDICINE

## 2023-11-13 RX ORDER — OXYCODONE AND ACETAMINOPHEN 10; 325 MG/1; MG/1
1 TABLET ORAL ONCE
Status: COMPLETED | OUTPATIENT
Start: 2023-11-13 | End: 2023-11-13

## 2023-11-13 RX ORDER — LIDOCAINE 50 MG/G
1 PATCH TOPICAL
Status: DISCONTINUED | OUTPATIENT
Start: 2023-11-13 | End: 2023-11-13 | Stop reason: HOSPADM

## 2023-11-13 RX ADMIN — OXYCODONE HYDROCHLORIDE AND ACETAMINOPHEN 1 TABLET: 10; 325 TABLET ORAL at 03:11

## 2023-11-13 RX ADMIN — LIDOCAINE 1 PATCH: 50 PATCH TOPICAL at 04:11

## 2023-11-13 NOTE — ED PROVIDER NOTES
Encounter Date: 11/13/2023       History     Chief Complaint   Patient presents with    Fall     Pt fell at gas station. Pt just finish dialysis and was on transportation bus back home, stopped for gas and pt was going in for some sodas and fell. Pt has history of back surgery from accident about 2 years ago     68-year-old male with a history of chronic kidney disease, on dialysis, diabetes was coming home from dialysis when he stopped at a store to get some cold drank slipped onto some concrete because it was raining.  Denies any loss of consciousness.  Complaining of right-sided chest wall pain with movement and palpation.  No shortness of breath.  Oxygen saturation is 98% on room air.  No alleviating factors.  Alert oriented x4, GCS is 15.  He is pleasant polite.  Speaking full sentences without issue.  History of this in the past      Review of patient's allergies indicates:  No Known Allergies  Past Medical History:   Diagnosis Date    Chronic kidney disease (CKD) 02/10/2020    CKD (chronic kidney disease) stage 3, GFR 30-59 ml/min 10/16/2014    CKD (chronic kidney disease), stage V 01/20/2020    COPD (chronic obstructive pulmonary disease)     COVID-19 01/03/2022    Diabetes mellitus     Dialysis patient     05/03/2023 LAST    Dysphagia 10/2021    Encounter for blood transfusion     Hypertension     Insomnia     PAD (peripheral artery disease)     Weight loss 10/2021     Past Surgical History:   Procedure Laterality Date    BACK SURGERY      KNEE SURGERY Left     TKR    PLACEMENT OF ARTERIOVENOUS GRAFT Left 02/10/2020    Procedure: INSERTION, GRAFT, ARTERIOVENOUS;  Surgeon: Artie Joshi MD;  Location: [facility];  Service: Cardiovascular;  Laterality: Left;    SPINAL FUSION N/A 11/07/2021    Procedure: FUSION, SPINE;  Surgeon: Vadim Lim MD;  Location: [address];  Service: Orthopedics;  Laterality: N/A;  ORIF T12 Fracture with T10-L2 Fusion, T11 and T12 laminectomy  Arian  SNS:  Motors/SSEP  New mali + pads  Craniotome and M8     Family History   Problem Relation Age of Onset    Diabetes Mother 83    No Known Problems Father     No Known Problems Sister     No Known Problems Brother     No Known Problems Brother     No Known Problems Brother     No Known Problems Brother      Social History     Tobacco Use    Smoking status: Former     Current packs/day: 1.00     Average packs/day: 1 pack/day for 20.0 years (20.0 ttl pk-yrs)     Types: Cigars, Cigarettes    Smokeless tobacco: Never    Tobacco comments:     Also reports 5-6 yrs of smoking cigars   Substance Use Topics    Alcohol use: No    Drug use: No     Review of Systems   Constitutional:  Negative for fever.   HENT:  Negative for sore throat.    Respiratory:  Negative for shortness of breath.    Cardiovascular:  Negative for chest pain.   Gastrointestinal:  Negative for nausea.   Genitourinary:  Negative for dysuria.   Musculoskeletal:  Negative for back pain.   Skin:  Negative for rash.   Neurological:  Negative for weakness.   Hematological:  Does not bruise/bleed easily.   All other systems reviewed and are negative.      Physical Exam     Initial Vitals   BP Pulse Resp Temp SpO2   11/13/23 1605 11/13/23 1605 11/13/23 1554 11/13/23 1605 11/13/23 1605   (!) 189/78 70 18 98.9 °F (37.2 °C) 97 %      MAP       --                Physical Exam    Nursing note and vitals reviewed.  Constitutional: He appears well-developed and well-nourished. He is not diaphoretic. No distress.   HENT:   Head: Normocephalic and atraumatic.   Eyes: Conjunctivae and EOM are normal. Pupils are equal, round, and reactive to light. Right eye exhibits no discharge. Left eye exhibits no discharge. No scleral icterus.   Neck: Neck supple. No JVD present.   Normal range of motion.  Cardiovascular:  Normal rate, regular rhythm, normal heart sounds and intact distal pulses.           No murmur heard.  Pulmonary/Chest: Breath sounds normal. No stridor. No respiratory  distress. He has no wheezes. He has no rhonchi. He has no rales. He exhibits tenderness.   Right-sided lateral chest wall tenderness with palpation, no crepitance, no rash   Abdominal: Abdomen is soft. Bowel sounds are normal. He exhibits no distension and no mass. There is no abdominal tenderness. There is no rebound and no guarding.   Musculoskeletal:         General: No tenderness or edema. Normal range of motion.      Cervical back: Normal range of motion and neck supple.     Neurological: He is alert and oriented to person, place, and time. He has normal strength. GCS score is 15. GCS eye subscore is 4. GCS verbal subscore is 5. GCS motor subscore is 6.   Skin: Skin is warm and dry. Capillary refill takes less than 2 seconds.         ED Course   Procedures  Labs Reviewed - No data to display       Imaging Results              X-Ray Chest 1 View (In process)                      X-Ray Hip 2 or 3 views Right (with Pelvis when performed) (In process)                      Medications   oxyCODONE-acetaminophen  mg per tablet 1 tablet (1 tablet Oral Given 11/13/23 1554)     Medical Decision Making  Amount and/or Complexity of Data Reviewed  Radiology: ordered.    Risk  Prescription drug management.               ED Course as of 11/13/23 1617   Mon Nov 13, 2023   1614 Chest x-ray with large left-sided pleural effusion that is consistent with a CT scan that he had this year.  Right hip is negative for acute fracture.  He is stable for discharge with close follow-up.  Alert oriented x4, GCS is 15 [SD]      ED Course User Index  [SD] Chris Santa MD               Medical Decision Making:   Differential Diagnosis:   Fall, chest wall pain, fractured rib      Clinical Impression:   Final diagnoses:  [W19.XXXA] Fall  [W19.XXXA] Fall, initial encounter (Primary)  [R07.89] Chest wall pain        ED Disposition Condition    Discharge Stable          ED Prescriptions    None       Follow-up Information       Follow  up With Specialties Details Why Contact Info Additional Information    Primary care physician  In 2 days       [place] - Emergency Department Emergency Medicine  As needed, If symptoms worsen [address] [facility] [id number] [phone] Floor 1             Chris Santa MD  11/13/23 0035

## 2023-11-14 ENCOUNTER — PATIENT OUTREACH (OUTPATIENT)
Dept: EMERGENCY MEDICINE | Facility: HOSPITAL | Age: 68
End: 2023-11-14
Payer: MEDICARE

## 2023-11-14 NOTE — PROGRESS NOTES
Pt declined assistance with apts.    Dahlia Rolon [facility]  [place] Navigator  [phone]

## 2023-11-17 ENCOUNTER — HOSPITAL ENCOUNTER (OUTPATIENT)
Dept: RADIOLOGY | Facility: HOSPITAL | Age: 68
Discharge: HOME | End: 2023-11-17
Payer: MEDICARE

## 2023-11-17 DIAGNOSIS — R06.02 SOB (SHORTNESS OF BREATH): ICD-10-CM

## 2023-11-17 DIAGNOSIS — R07.9 CHEST PAIN: Primary | ICD-10-CM

## 2023-11-17 DIAGNOSIS — R07.9 CHEST PAIN: ICD-10-CM

## 2023-11-17 PROCEDURE — 71045 X-RAY EXAM CHEST 1 VIEW: CPT | Mod: TC

## 2023-11-18 PROBLEM — R06.02 SHORTNESS OF BREATH: Status: ACTIVE | Noted: 2023-11-18

## 2023-11-18 PROBLEM — J90 PLEURAL EFFUSION: Status: ACTIVE | Noted: 2023-11-18

## 2023-11-18 PROBLEM — R07.89 CHEST WALL PAIN: Status: ACTIVE | Noted: 2023-11-18

## 2023-11-19 PROBLEM — E63.9 INADEQUATE DIETARY ENERGY INTAKE: Status: ACTIVE | Noted: 2023-11-19

## 2023-11-28 ENCOUNTER — OUTPATIENT CASE MANAGEMENT (OUTPATIENT)
Dept: ADMINISTRATIVE | Facility: OTHER | Age: 68
End: 2023-11-28
Payer: MEDICARE

## 2023-11-28 NOTE — PROGRESS NOTES
Outpatient Care Management  Patient Does Not Consent    Patient: Kevin Sanon  MRN:  [id number]  Date of Service:  11/28/2023  Completed by:  Irene Alvarado RN    Chief Complaint   Patient presents with    OPCM Enrollment Call    Case Closure     Able to reach pt on home #, declines OPCM so case will be closed.        Patient Summary           Consent Received:  Decline

## 2023-12-15 ENCOUNTER — HOSPITAL ENCOUNTER (EMERGENCY)
Facility: HOSPITAL | Age: 68
Discharge: HOME | End: 2023-12-15
Attending: EMERGENCY MEDICINE
Payer: MEDICARE

## 2023-12-15 VITALS
DIASTOLIC BLOOD PRESSURE: 56 MMHG | HEART RATE: 61 BPM | WEIGHT: 168 LBS | SYSTOLIC BLOOD PRESSURE: 110 MMHG | TEMPERATURE: 98 F | RESPIRATION RATE: 21 BRPM | BODY MASS INDEX: 22.75 KG/M2 | OXYGEN SATURATION: 92 % | HEIGHT: 72 IN

## 2023-12-15 DIAGNOSIS — N18.6 END STAGE RENAL DISEASE ON DIALYSIS: ICD-10-CM

## 2023-12-15 DIAGNOSIS — R53.1 GENERAL WEAKNESS: ICD-10-CM

## 2023-12-15 DIAGNOSIS — R53.1 GENERALIZED WEAKNESS: Primary | ICD-10-CM

## 2023-12-15 DIAGNOSIS — Z99.2 END STAGE RENAL DISEASE ON DIALYSIS: ICD-10-CM

## 2023-12-15 LAB
ALBUMIN SERPL BCP-MCNC: 2.4 G/DL (ref 3.5–5.2)
ALP SERPL-CCNC: 60 U/L (ref 55–135)
ALT SERPL W/O P-5'-P-CCNC: 18 U/L (ref 10–44)
ANION GAP SERPL CALC-SCNC: 8 MMOL/L (ref 3–11)
AST SERPL-CCNC: 27 U/L (ref 10–40)
BASOPHILS # BLD AUTO: 0.01 K/UL (ref 0–0.2)
BASOPHILS NFR BLD: 0.2 % (ref 0–1.9)
BILIRUB SERPL-MCNC: 0.5 MG/DL (ref 0.1–1)
BUN SERPL-MCNC: 17 MG/DL (ref 8–23)
CALCIUM SERPL-MCNC: 8.1 MG/DL (ref 8.7–10.5)
CHLORIDE SERPL-SCNC: 96 MMOL/L (ref 95–110)
CO2 SERPL-SCNC: 31 MMOL/L (ref 23–29)
CREAT SERPL-MCNC: 6.3 MG/DL (ref 0.5–1.4)
CTP QC/QA: YES
CTP QC/QA: YES
DIFFERENTIAL METHOD: ABNORMAL
EOSINOPHIL # BLD AUTO: 0.1 K/UL (ref 0–0.5)
EOSINOPHIL NFR BLD: 1.2 % (ref 0–8)
ERYTHROCYTE [DISTWIDTH] IN BLOOD BY AUTOMATED COUNT: 14.9 % (ref 11.5–14.5)
EST. GFR  (NO RACE VARIABLE): 9.1 ML/MIN/1.73 M^2
GLUCOSE SERPL-MCNC: 86 MG/DL (ref 70–110)
HCT VFR BLD AUTO: 27.1 % (ref 40–54)
HGB BLD-MCNC: 8.5 G/DL (ref 14–18)
IMM GRANULOCYTES # BLD AUTO: 0.02 K/UL (ref 0–0.04)
IMM GRANULOCYTES NFR BLD AUTO: 0.5 % (ref 0–0.5)
LYMPHOCYTES # BLD AUTO: 0.6 K/UL (ref 1–4.8)
LYMPHOCYTES NFR BLD: 14.6 % (ref 18–48)
MCH RBC QN AUTO: 30.8 PG (ref 27–31)
MCHC RBC AUTO-ENTMCNC: 31.4 G/DL (ref 32–36)
MCV RBC AUTO: 98 FL (ref 82–98)
MONOCYTES # BLD AUTO: 0.5 K/UL (ref 0.3–1)
MONOCYTES NFR BLD: 11.2 % (ref 4–15)
NEUTROPHILS # BLD AUTO: 2.9 K/UL (ref 1.8–7.7)
NEUTROPHILS NFR BLD: 72.3 % (ref 38–73)
NRBC BLD-RTO: 0 /100 WBC
PLATELET # BLD AUTO: 144 K/UL (ref 150–450)
PMV BLD AUTO: 10.6 FL (ref 9.2–12.9)
POC MOLECULAR INFLUENZA A AGN: NEGATIVE
POC MOLECULAR INFLUENZA B AGN: NEGATIVE
POTASSIUM SERPL-SCNC: 4 MMOL/L (ref 3.5–5.1)
PROT SERPL-MCNC: 7 G/DL (ref 6–8.4)
RBC # BLD AUTO: 2.76 M/UL (ref 4.6–6.2)
SARS-COV-2 RDRP RESP QL NAA+PROBE: NEGATIVE
SODIUM SERPL-SCNC: 135 MMOL/L (ref 136–145)
TROPONIN I SERPL DL<=0.01 NG/ML-MCNC: 2126.2 PG/ML (ref 0–60)
TROPONIN I SERPL DL<=0.01 NG/ML-MCNC: 2133.3 PG/ML (ref 0–60)
WBC # BLD AUTO: 4.03 K/UL (ref 3.9–12.7)

## 2023-12-15 PROCEDURE — 87502 INFLUENZA DNA AMP PROBE: CPT

## 2023-12-15 PROCEDURE — 80053 COMPREHEN METABOLIC PANEL: CPT | Performed by: EMERGENCY MEDICINE

## 2023-12-15 PROCEDURE — 85025 COMPLETE CBC W/AUTO DIFF WBC: CPT | Performed by: EMERGENCY MEDICINE

## 2023-12-15 PROCEDURE — 84484 ASSAY OF TROPONIN QUANT: CPT | Mod: 91 | Performed by: EMERGENCY MEDICINE

## 2023-12-15 PROCEDURE — 36415 COLL VENOUS BLD VENIPUNCTURE: CPT | Performed by: EMERGENCY MEDICINE

## 2023-12-15 PROCEDURE — 93005 ELECTROCARDIOGRAM TRACING: CPT

## 2023-12-15 PROCEDURE — 93010 ELECTROCARDIOGRAM REPORT: CPT | Mod: ,,, | Performed by: INTERNAL MEDICINE

## 2023-12-15 PROCEDURE — 99284 EMERGENCY DEPT VISIT MOD MDM: CPT

## 2023-12-15 PROCEDURE — 93010 EKG 12-LEAD: ICD-10-PCS | Mod: ,,, | Performed by: INTERNAL MEDICINE

## 2023-12-15 PROCEDURE — 87635 SARS-COV-2 COVID-19 AMP PRB: CPT | Performed by: EMERGENCY MEDICINE

## 2023-12-15 NOTE — ED PROVIDER NOTES
Encounter Date: 12/15/2023       History     Chief Complaint   Patient presents with    Weakness     Pt to ED via EMS - stated that while receiving dialysis he became weak and not feeling well - found to be hypotensive, systolic 70s. Fluid bolus 850ml given by Dialysis staff prior to arrival.      68-year-old male with chronic kidney disease on dialysis, Monday Wednesday Friday dialysis, was at dialysis this morning, was into our to and he became weak.  Blood pressure had dropped, EMS dispatch and brought to the emergency department.  Here in the emergency depart he states he just feels cold.  He is alert and oriented x4, GCS is 15.  History of this multiple times in the past.  Denies any fever.  No nausea vomiting.  No chest pain      Review of patient's allergies indicates:  No Known Allergies  Past Medical History:   Diagnosis Date    Chronic kidney disease (CKD) 02/10/2020    CKD (chronic kidney disease) stage 3, GFR 30-59 ml/min 10/16/2014    CKD (chronic kidney disease), stage V 01/20/2020    COPD (chronic obstructive pulmonary disease)     COVID-19 01/03/2022    Diabetes mellitus     Dialysis patient     05/03/2023 LAST    Dysphagia 10/2021    Encounter for blood transfusion     Hypertension     Insomnia     PAD (peripheral artery disease)     Weight loss 10/2021     Past Surgical History:   Procedure Laterality Date    BACK SURGERY      KNEE SURGERY Left     TKR    PLACEMENT OF ARTERIOVENOUS GRAFT Left 02/10/2020    Procedure: INSERTION, GRAFT, ARTERIOVENOUS;  Surgeon: Artie Joshi MD;  Location: [facility];  Service: Cardiovascular;  Laterality: Left;    SPINAL FUSION N/A 11/07/2021    Procedure: FUSION, SPINE;  Surgeon: Vadim Lim MD;  Location: [address];  Service: Orthopedics;  Laterality: N/A;  ORIF T12 Fracture with T10-L2 Fusion, T11 and T12 laminectomy  Buffer  SNS: Motors/SSEP  New mali + pads  Craniotome and M8     Family History   Problem Relation Age of Onset    Diabetes  Mother 83    No Known Problems Father     No Known Problems Sister     No Known Problems Brother     No Known Problems Brother     No Known Problems Brother     No Known Problems Brother      Social History     Tobacco Use    Smoking status: Former     Current packs/day: 1.00     Average packs/day: 1 pack/day for 20.0 years (20.0 ttl pk-yrs)     Types: Cigars, Cigarettes    Smokeless tobacco: Never    Tobacco comments:     Also reports 5-6 yrs of smoking cigars   Substance Use Topics    Alcohol use: No    Drug use: No     Review of Systems   Constitutional:  Positive for fatigue. Negative for fever.   HENT:  Negative for sore throat.    Respiratory:  Negative for shortness of breath.    Cardiovascular:  Negative for chest pain.   Gastrointestinal:  Negative for nausea.   Genitourinary:  Negative for dysuria.   Musculoskeletal:  Negative for back pain.   Skin:  Negative for rash.   Neurological:  Negative for weakness.   Hematological:  Does not bruise/bleed easily.   All other systems reviewed and are negative.      Physical Exam     Initial Vitals [12/15/23 1215]   BP Pulse Resp Temp SpO2   131/60 63 (!) 24 -- 97 %      MAP       --         Physical Exam    Nursing note and vitals reviewed.  Constitutional: He appears well-developed and well-nourished. He is not diaphoretic. No distress.   HENT:   Head: Normocephalic and atraumatic.   Mouth/Throat: Oropharynx is clear and moist.   Eyes: Conjunctivae and EOM are normal. Pupils are equal, round, and reactive to light. Right eye exhibits no discharge. Left eye exhibits no discharge. No scleral icterus.   Neck: Neck supple. No JVD present.   Normal range of motion.  Cardiovascular:  Normal rate, regular rhythm, normal heart sounds and intact distal pulses.           No murmur heard.  Pulmonary/Chest: Breath sounds normal. No stridor. No respiratory distress. He has no wheezes. He has no rhonchi. He has no rales. He exhibits no tenderness.   Abdominal: Abdomen is soft.  Bowel sounds are normal. He exhibits no distension and no mass. There is no abdominal tenderness. There is no rebound and no guarding.   Musculoskeletal:         General: No tenderness or edema. Normal range of motion.      Cervical back: Normal range of motion and neck supple.     Neurological: He is alert and oriented to person, place, and time. He has normal strength. GCS score is 15. GCS eye subscore is 4. GCS verbal subscore is 5. GCS motor subscore is 6.   Skin: Skin is warm and dry. Capillary refill takes less than 2 seconds.         ED Course   Procedures  Labs Reviewed   CBC W/ AUTO DIFFERENTIAL - Abnormal; Notable for the following components:       Result Value    RBC 2.76 (*)     Hemoglobin 8.5 (*)     Hematocrit 27.1 (*)     MCHC 31.4 (*)     RDW 14.9 (*)     Platelets 144 (*)     Lymph # 0.6 (*)     Lymph % 14.6 (*)     All other components within normal limits   COMPREHENSIVE METABOLIC PANEL - Abnormal; Notable for the following components:    Sodium 135 (*)     CO2 31 (*)     Creatinine 6.3 (*)     Calcium 8.1 (*)     Albumin 2.4 (*)     eGFR 9.1 (*)     All other components within normal limits   TROPONIN I HIGH SENSITIVITY - Abnormal; Notable for the following components:    Troponin I High Sensitivity 2133.3 (*)     All other components within normal limits   TROPONIN I HIGH SENSITIVITY - Abnormal; Notable for the following components:    Troponin I High Sensitivity 2126.2 (*)     All other components within normal limits   POCT INFLUENZA A/B MOLECULAR   SARS-COV-2 RDRP GENE     EKG Readings: (Independently Interpreted)   Initial Reading: No STEMI. Rhythm: Normal Sinus Rhythm. Heart Rate: 63. Ectopy: No Ectopy. Conduction: Normal. ST Segments: Normal ST Segments. T Waves: Normal. Axis: Normal. Clinical Impression: Normal Sinus Rhythm     ECG Results              EKG 12-lead (Final result)  Result time 12/15/23 12:41:11      Final result by Interface, Lab In [facility] (12/15/23 12:41:11)                    Narrative:    Test Reason : R53.1,    Vent. Rate : 063 BPM     Atrial Rate : 063 BPM     P-R Int : 170 ms          QRS Dur : 086 ms      QT Int : 442 ms       P-R-T Axes : -02 023 137 degrees     QTc Int : 452 ms    Normal sinus rhythm  Low voltage QRS  Possible Inferior infarct ,age undetermined  T wave abnormality, consider lateral ischemia  Abnormal ECG  When compared with ECG of 17-NOV-2023 23:47,  Borderline criteria for Inferior infarct are now Present  Confirmed by Madhuri Meraz MD ([id number]) on 12/15/2023 12:41:08 PM    Referred By: System System           Confirmed By:Madhuri Meraz MD                                  Imaging Results    None          Medications - No data to display  Medical Decision Making  Amount and/or Complexity of Data Reviewed  Labs: ordered.               ED Course as of 12/15/23 1443   Fri Dec 15, 2023   1442 Troponin is elevated and stable consistent with chronic kidney disease.  He has no chest pain.  It was repeated and actually decreased.  He has improved since being here.  Stable for discharge and follow up to primary care physician [SD]      ED Course User Index  [SD] Chris Santa MD               Medical Decision Making:   Differential Diagnosis:   Generalized weakness, hypotensive episode during dialysis             Clinical Impression:  Final diagnoses:  [R53.1] General weakness  [R53.1] Generalized weakness (Primary)  [N18.6, Z99.2] End stage renal disease on dialysis          ED Disposition Condition    Discharge Stable          ED Prescriptions    None       Follow-up Information       Follow up With Specialties Details Why Contact Info Additional Information    Chencho Frank III, MD Internal Medicine In 2 days  [facility] [facility] [id number]  [phone]       [place] - Emergency Department Emergency Medicine  As needed, If symptoms worsen [address] [place] [id number] [phone] Floor 1             Chris Santa,  MD  12/15/23 4372

## 2023-12-15 NOTE — ED NOTES
Spoke with Shantelle RN at  Dialysis. Will call her back with pt report after test results.  Pt will need to drive by to have his needles removed, or if he stays here, she'll send someone to remove them.

## 2023-12-18 ENCOUNTER — PATIENT OUTREACH (OUTPATIENT)
Dept: EMERGENCY MEDICINE | Facility: HOSPITAL | Age: 68
End: 2023-12-18
Payer: MEDICARE

## 2023-12-18 NOTE — PROGRESS NOTES
1st attempt to reach patient to assist in scheduling a post ED 7-day follow up with PCP. Patient requested a call back on Tuesday since he currently in dialysis.

## 2023-12-19 NOTE — PROGRESS NOTES
[facility]  Advanced Heart Failure Inpatient Progress Note    Cayden Quiroz Patient Status:  Inpatient    1948 MRN [id number]   Location [facility] CRITICAL CARE UNIT Attending Tin Burnett MD   Hosp Day # 5 PCP Jassi Sawant MD     Telemetry: Personally reviewed, SB 50s    Assessment:  Dyspnea  Symptomatic SSS/Junctional rhythm s/p Medtronic dual chamber PPM implant 2023  Acute on Chronic HFrEF with LV recovery  CAD s/p PCI  Severe low flow low gradient AS s/p TAVR  HL  DM2  Paresthesia     Plan:  -volume status has improved, perfusion is adequate  -suspect decompensation due to rhythm issues  -no further diuretics needed today- he has been taking torsemide only as needed so far as outpatient  -Unclear why he is on amiodarone, will discontinue  -s/p PPM implant: CXR and device interrogation today  -mildly orthostatic today, hold Entresto and Toprol this AM. Resume Midodrine 5mg TID  -repeat orthostatics this afternoon  -recommend PT/OT  -Follow I/O closely    Cayden is ready for discharge from my viewpoint: No  Workup prior to discharge: orthostasis  Medications changes at discharge:  pending  Follow up appointments needed after discharge:  EP, device clinic  Estimated Date of Discharge documented: 2023      TROY Pollard  [facility]  Heart Failure Service    Physician Note      I saw and examined the patient. The patients symptoms, physical findings, and studies, are as documented above by the Nurse Practitioner;  I discussed the patients treatment plans with the patient and family.  Data Reviewed by me included I personally reviewed all pertinent labs and images done over the past 24 hours     Portions of the History were performed by the Nurse Practitioner and portions of the Physical Examination and complete [facility] Medical Decision Making were done by me.     My assessment and plan are as documented above by the Nurse  ED Navigator attempted to contact patient on 2 separate occasions, patient is unable to reach. ED Navigator to close encounter at this time.      Practitioner, and I have reviewed and edited the above note as needed.     More than 50% of the clinical time and 100% of the medical decision making was performed by me.      S:  S/p PPM. LH feels better. Having some pain in 1st metacarpal joint.      O:  Heart sounds RRR, S1,S2, lungs CTAB, no JVD, no LE edema, a,ox3.     A/P:  Symptomatic bradycardia  Acute on chronic HFrEF     -LVEF remains stable  -Decompensation is likely rhythm related - volume status has improved.  -S/p PPM 5/22  -Gab hold diuretics now. Home GDMT on hold due to orthostatic hypotension. Likely volume mediated.  -Check uric acid  -Rest of plan as above     Plan discussed with nursing staff     Marlon Mendoza MD  Advanced Heart Failure and Pulmonary Hypertension  [facility]          Subjective:  Patient seen this AM feeling well. RN paged with hypotension and dizziness. Holding some meds    OBJECTIVE:  Vitals:  Vitals:    05/23/23 0417 05/23/23 0600 05/23/23 0800 05/23/23 0915   BP:   115/63 94/53   BP Location:   LUE - Left upper extremity    Patient Position:   Semi-Orr's    Pulse:   70 70   Resp:   17 15   Temp: 97 °F (36.1 °C)  96.4 °F (35.8 °C)    TempSrc: Temporal  Temporal    SpO2:   97% 98%   Weight:  88.7 kg (195 lb 8.8 oz)     Height:           Weight:  Weight    05/20/23 0600 05/21/23 0400 05/22/23 0400 05/23/23 0600   Weight: 87.1 kg (192 lb 0.3 oz) 88.3 kg (194 lb 10.7 oz) 88.9 kg (195 lb 15.8 oz) 88.7 kg (195 lb 8.8 oz)       Intake/Output:     Intake/Output Summary (Last 24 hours) at 5/23/2023 1103  Last data filed at 5/23/2023 0625  Gross per 24 hour   Intake 900 ml   Output 1950 ml   Net -1050 ml       Labs:     Recent Labs   Lab 05/23/23  0454   SODIUM 139   POTASSIUM 4.3   CHLORIDE 111*   CO2 24   BUN 36*   CREATININE 1.42*   GLUCOSE 180*   CALCIUM 9.6   WBC 9.2   RBC 3.34*   HGB 10.2*   HCT 32.8*          No results for input(s): RAPDTR, LACTA, PT, INR, BNP in the last 72 hours.    Medications:  Current  Facility-Administered Medications   Medication   • metoPROLOL succinate (TOPROL-XL) ER tablet 25 mg   • acetaminophen-codeine (TYLENOL NO.3) 300-30 MG per tablet 2 tablet   • atorvastatin (LIPITOR) tablet 40 mg   • clopidogrel (PLAVIX) tablet 75 mg   • cholecalciferol (VITAMIN D) tablet 1,000 Units   • dextrose 50 % injection 25 g   • dextrose 50 % injection 12.5 g   • glucagon (GLUCAGEN) injection 1 mg   • dextrose (GLUTOSE) 40 % gel 15 g   • dextrose (GLUTOSE) 40 % gel 30 g   • sodium chloride 0.9 % flush bag 25 mL   • sodium chloride (PF) 0.9 % injection 2 mL   • sodium chloride 0.9 % flush bag 25 mL   • sodium chloride (NORMAL SALINE) 0.9 % bolus 500 mL   • heparin (porcine) injection 5,000 Units   • insulin lispro (ADMELOG,HumaLOG) - Correction Dose   • insulin lispro (ADMELOG,HumaLOG) - Correction Dose   • Potassium Standard Replacement Protocol (Levels 3.5 and lower)   • Magnesium Standard Replacement Protocol   • ondansetron (ZOFRAN) injection 4 mg   • prochlorperazine (COMPAZINE) tablet 5 mg   • acetaminophen (TYLENOL) tablet 650 mg   • polyethylene glycol (MIRALAX) packet 17 g   • docusate sodium-sennosides (SENOKOT S) 50-8.6 MG 2 tablet   • magnesium hydroxide (MILK OF MAGNESIA) 400 MG/5ML suspension 30 mL   • aluminum-magnesium hydroxide-simethicone (MAALOX) 200-200-20 MG/5ML suspension 30 mL   • sacubitril-valsartan (ENTRESTO) 24-26 MG per tablet 1 tablet   • empagliflozin (JARDIANCE) tablet 10 mg   • atropine injection 0.5 mg        Physical Exam:  General: Alert and Oriented x3  HEENT: Normocephalic, anicteric sclera, neck supple.    Neck: no JVD  Cardiac: bradycardic, S1, S2 normal, no S3. No G/R/M  Lungs: CTA without wheezes, rales, or rhonchi  Abdomen: Soft, non-tender. BS-present.  Extremities: Without edema.    Neurologic: Non-focal  Skin: Warm and dry, intact

## 2024-01-01 ENCOUNTER — HOSPITAL ENCOUNTER (INPATIENT)
Facility: HOSPITAL | Age: 69
LOS: 1 days | Discharge: ANOTHER HOSPITAL | DRG: 682 | End: 2024-02-10
Attending: EMERGENCY MEDICINE | Admitting: INTERNAL MEDICINE
Payer: MEDICARE

## 2024-01-01 ENCOUNTER — HOSPITAL ENCOUNTER (EMERGENCY)
Facility: HOSPITAL | Age: 69
Discharge: HOME | End: 2024-04-14
Attending: EMERGENCY MEDICINE
Payer: MEDICARE

## 2024-01-01 ENCOUNTER — CLINICAL SUPPORT (OUTPATIENT)
Dept: CARDIOLOGY | Facility: HOSPITAL | Age: 69
DRG: 682 | End: 2024-01-01
Attending: EMERGENCY MEDICINE
Payer: MEDICARE

## 2024-01-01 VITALS
BODY MASS INDEX: 18.96 KG/M2 | RESPIRATION RATE: 19 BRPM | DIASTOLIC BLOOD PRESSURE: 78 MMHG | WEIGHT: 140 LBS | SYSTOLIC BLOOD PRESSURE: 185 MMHG | HEIGHT: 72 IN | OXYGEN SATURATION: 99 % | HEART RATE: 74 BPM | TEMPERATURE: 97 F

## 2024-01-01 VITALS
HEIGHT: 72 IN | WEIGHT: 168.63 LBS | DIASTOLIC BLOOD PRESSURE: 65 MMHG | SYSTOLIC BLOOD PRESSURE: 139 MMHG | RESPIRATION RATE: 18 BRPM | HEART RATE: 79 BPM | BODY MASS INDEX: 22.84 KG/M2 | TEMPERATURE: 98 F | OXYGEN SATURATION: 97 %

## 2024-01-01 VITALS
BODY MASS INDEX: 22.75 KG/M2 | HEIGHT: 72 IN | SYSTOLIC BLOOD PRESSURE: 210 MMHG | DIASTOLIC BLOOD PRESSURE: 100 MMHG | WEIGHT: 168 LBS

## 2024-01-01 DIAGNOSIS — D64.9 ANEMIA, UNSPECIFIED TYPE: Primary | ICD-10-CM

## 2024-01-01 DIAGNOSIS — N18.6 ANEMIA IN ESRD (END-STAGE RENAL DISEASE): ICD-10-CM

## 2024-01-01 DIAGNOSIS — R06.02 SOB (SHORTNESS OF BREATH): ICD-10-CM

## 2024-01-01 DIAGNOSIS — Z99.2 DIALYSIS PATIENT: ICD-10-CM

## 2024-01-01 DIAGNOSIS — R07.9 CHEST PAIN: ICD-10-CM

## 2024-01-01 DIAGNOSIS — F41.9 ANXIETY: ICD-10-CM

## 2024-01-01 DIAGNOSIS — D63.1 ANEMIA IN ESRD (END-STAGE RENAL DISEASE): ICD-10-CM

## 2024-01-01 DIAGNOSIS — J90 PLEURAL EFFUSION ON LEFT: ICD-10-CM

## 2024-01-01 DIAGNOSIS — E63.9 INADEQUATE DIETARY ENERGY INTAKE: ICD-10-CM

## 2024-01-01 DIAGNOSIS — R06.02 SOBOE (SHORTNESS OF BREATH ON EXERTION): ICD-10-CM

## 2024-01-01 DIAGNOSIS — J96.10 CHRONIC RESPIRATORY FAILURE, UNSPECIFIED WHETHER WITH HYPOXIA OR HYPERCAPNIA: Primary | ICD-10-CM

## 2024-01-01 DIAGNOSIS — J90 BILATERAL PLEURAL EFFUSION: ICD-10-CM

## 2024-01-01 LAB
ABO + RH BLD: NORMAL
ALBUMIN SERPL BCP-MCNC: 2.2 G/DL (ref 3.5–5.2)
ALBUMIN SERPL BCP-MCNC: 2.4 G/DL (ref 3.5–5.2)
ALBUMIN SERPL BCP-MCNC: 2.4 G/DL (ref 3.5–5.2)
ALP SERPL-CCNC: 62 U/L (ref 55–135)
ALP SERPL-CCNC: 69 U/L (ref 55–135)
ALP SERPL-CCNC: 69 U/L (ref 55–135)
ALT SERPL W/O P-5'-P-CCNC: 17 U/L (ref 10–44)
ALT SERPL W/O P-5'-P-CCNC: 19 U/L (ref 10–44)
ALT SERPL W/O P-5'-P-CCNC: 20 U/L (ref 10–44)
ANION GAP SERPL CALC-SCNC: 4 MMOL/L (ref 3–11)
ANION GAP SERPL CALC-SCNC: 5 MMOL/L (ref 3–11)
ANION GAP SERPL CALC-SCNC: 5 MMOL/L (ref 3–11)
ANION GAP SERPL CALC-SCNC: 6 MMOL/L (ref 3–11)
AORTIC ROOT ANNULUS: 3.72 CM
AORTIC VALVE CUSP SEPERATION: 1.85 CM
AST SERPL-CCNC: 31 U/L (ref 10–40)
AST SERPL-CCNC: 34 U/L (ref 10–40)
AST SERPL-CCNC: 34 U/L (ref 10–40)
BASOPHILS # BLD AUTO: 0.01 K/UL (ref 0–0.2)
BASOPHILS # BLD AUTO: 0.01 K/UL (ref 0–0.2)
BASOPHILS # BLD AUTO: 0.02 K/UL (ref 0–0.2)
BASOPHILS # BLD AUTO: 0.03 K/UL (ref 0–0.2)
BASOPHILS NFR BLD: 0.2 % (ref 0–1.9)
BASOPHILS NFR BLD: 0.5 % (ref 0–1.9)
BILIRUB SERPL-MCNC: 0.5 MG/DL (ref 0.1–1)
BILIRUB SERPL-MCNC: 0.7 MG/DL (ref 0.1–1)
BILIRUB SERPL-MCNC: 0.8 MG/DL (ref 0.1–1)
BLD GP AB SCN CELLS X3 SERPL QL: NORMAL
BLD PROD TYP BPU: NORMAL
BLD PROD TYP BPU: NORMAL
BLOOD UNIT EXPIRATION DATE: NORMAL
BLOOD UNIT EXPIRATION DATE: NORMAL
BLOOD UNIT TYPE CODE: 9500
BLOOD UNIT TYPE CODE: 9500
BLOOD UNIT TYPE: NORMAL
BLOOD UNIT TYPE: NORMAL
BSA FOR ECHO PROCEDURE: 1.97 M2
BUN SERPL-MCNC: 16 MG/DL (ref 8–23)
BUN SERPL-MCNC: 17 MG/DL (ref 8–23)
BUN SERPL-MCNC: 19 MG/DL (ref 8–23)
BUN SERPL-MCNC: 26 MG/DL (ref 8–23)
CALCIUM SERPL-MCNC: 7.9 MG/DL (ref 8.7–10.5)
CALCIUM SERPL-MCNC: 7.9 MG/DL (ref 8.7–10.5)
CALCIUM SERPL-MCNC: 8.2 MG/DL (ref 8.7–10.5)
CALCIUM SERPL-MCNC: 9.6 MG/DL (ref 8.7–10.5)
CHLORIDE SERPL-SCNC: 95 MMOL/L (ref 95–110)
CHLORIDE SERPL-SCNC: 96 MMOL/L (ref 95–110)
CHLORIDE SERPL-SCNC: 98 MMOL/L (ref 95–110)
CHLORIDE SERPL-SCNC: 99 MMOL/L (ref 95–110)
CO2 SERPL-SCNC: 29 MMOL/L (ref 23–29)
CO2 SERPL-SCNC: 31 MMOL/L (ref 23–29)
CO2 SERPL-SCNC: 31 MMOL/L (ref 23–29)
CO2 SERPL-SCNC: 32 MMOL/L (ref 23–29)
CODING SYSTEM: NORMAL
CODING SYSTEM: NORMAL
CREAT SERPL-MCNC: 4.2 MG/DL (ref 0.5–1.4)
CREAT SERPL-MCNC: 5.3 MG/DL (ref 0.5–1.4)
CREAT SERPL-MCNC: 5.5 MG/DL (ref 0.5–1.4)
CREAT SERPL-MCNC: 6.3 MG/DL (ref 0.5–1.4)
CROSSMATCH INTERPRETATION: NORMAL
CROSSMATCH INTERPRETATION: NORMAL
CV ECHO LV RWT: 0.57 CM
DIFFERENTIAL METHOD BLD: ABNORMAL
DISPENSE STATUS: NORMAL
DISPENSE STATUS: NORMAL
DOP CALC LVOT AREA: 3.5 CM2
DOP CALC LVOT DIAMETER: 2.12 CM
ECHO LV POSTERIOR WALL: 1.28 CM (ref 0.6–1.1)
EOSINOPHIL # BLD AUTO: 0 K/UL (ref 0–0.5)
EOSINOPHIL # BLD AUTO: 0 K/UL (ref 0–0.5)
EOSINOPHIL # BLD AUTO: 0.1 K/UL (ref 0–0.5)
EOSINOPHIL # BLD AUTO: 0.1 K/UL (ref 0–0.5)
EOSINOPHIL NFR BLD: 0.5 % (ref 0–8)
EOSINOPHIL NFR BLD: 0.5 % (ref 0–8)
EOSINOPHIL NFR BLD: 0.7 % (ref 0–8)
EOSINOPHIL NFR BLD: 1.2 % (ref 0–8)
ERYTHROCYTE [DISTWIDTH] IN BLOOD BY AUTOMATED COUNT: 14.6 % (ref 11.5–14.5)
ERYTHROCYTE [DISTWIDTH] IN BLOOD BY AUTOMATED COUNT: 15.8 % (ref 11.5–14.5)
ERYTHROCYTE [DISTWIDTH] IN BLOOD BY AUTOMATED COUNT: 16.4 % (ref 11.5–14.5)
ERYTHROCYTE [DISTWIDTH] IN BLOOD BY AUTOMATED COUNT: 16.7 % (ref 11.5–14.5)
EST. GFR  (NO RACE VARIABLE): 10.7 ML/MIN/1.73 M^2
EST. GFR  (NO RACE VARIABLE): 11.2 ML/MIN/1.73 M^2
EST. GFR  (NO RACE VARIABLE): 14.7 ML/MIN/1.73 M^2
EST. GFR  (NO RACE VARIABLE): 9.1 ML/MIN/1.73 M^2
FRACTIONAL SHORTENING: 31 % (ref 28–44)
GLUCOSE SERPL-MCNC: 104 MG/DL (ref 70–110)
GLUCOSE SERPL-MCNC: 107 MG/DL (ref 70–110)
GLUCOSE SERPL-MCNC: 79 MG/DL (ref 70–110)
GLUCOSE SERPL-MCNC: 93 MG/DL (ref 70–110)
HCT VFR BLD AUTO: 22 % (ref 40–54)
HCT VFR BLD AUTO: 28.5 % (ref 40–54)
HCT VFR BLD AUTO: 29.5 % (ref 40–54)
HCT VFR BLD AUTO: 31.9 % (ref 40–54)
HGB BLD-MCNC: 10.5 G/DL (ref 14–18)
HGB BLD-MCNC: 6.9 G/DL (ref 14–18)
HGB BLD-MCNC: 9 G/DL (ref 14–18)
HGB BLD-MCNC: 9.4 G/DL (ref 14–18)
IMM GRANULOCYTES # BLD AUTO: 0.03 K/UL (ref 0–0.04)
IMM GRANULOCYTES # BLD AUTO: 0.03 K/UL (ref 0–0.04)
IMM GRANULOCYTES # BLD AUTO: 0.07 K/UL (ref 0–0.04)
IMM GRANULOCYTES # BLD AUTO: 0.14 K/UL (ref 0–0.04)
IMM GRANULOCYTES NFR BLD AUTO: 0.7 % (ref 0–0.5)
IMM GRANULOCYTES NFR BLD AUTO: 0.7 % (ref 0–0.5)
IMM GRANULOCYTES NFR BLD AUTO: 1.1 % (ref 0–0.5)
IMM GRANULOCYTES NFR BLD AUTO: 1.3 % (ref 0–0.5)
INTERVENTRICULAR SEPTUM: 1.61 CM (ref 0.6–1.1)
LEFT INTERNAL DIMENSION IN SYSTOLE: 3.11 CM (ref 2.1–4)
LEFT VENTRICLE DIASTOLIC VOLUME INDEX: 46.6 ML/M2
LEFT VENTRICLE DIASTOLIC VOLUME: 92.27 ML
LEFT VENTRICLE MASS INDEX: 132 G/M2
LEFT VENTRICLE SYSTOLIC VOLUME INDEX: 19.2 ML/M2
LEFT VENTRICLE SYSTOLIC VOLUME: 38.11 ML
LEFT VENTRICULAR INTERNAL DIMENSION IN DIASTOLE: 4.5 CM (ref 3.5–6)
LEFT VENTRICULAR MASS: 260.57 G
LYMPHOCYTES # BLD AUTO: 0.5 K/UL (ref 1–4.8)
LYMPHOCYTES # BLD AUTO: 0.6 K/UL (ref 1–4.8)
LYMPHOCYTES # BLD AUTO: 0.7 K/UL (ref 1–4.8)
LYMPHOCYTES # BLD AUTO: 0.9 K/UL (ref 1–4.8)
LYMPHOCYTES NFR BLD: 14.7 % (ref 18–48)
LYMPHOCYTES NFR BLD: 15.8 % (ref 18–48)
LYMPHOCYTES NFR BLD: 7.1 % (ref 18–48)
LYMPHOCYTES NFR BLD: 8.5 % (ref 18–48)
MAGNESIUM SERPL-MCNC: 1.8 MG/DL (ref 1.6–2.6)
MAGNESIUM SERPL-MCNC: 1.9 MG/DL (ref 1.6–2.6)
MCH RBC QN AUTO: 30.2 PG (ref 27–31)
MCH RBC QN AUTO: 30.2 PG (ref 27–31)
MCH RBC QN AUTO: 30.5 PG (ref 27–31)
MCH RBC QN AUTO: 31 PG (ref 27–31)
MCHC RBC AUTO-ENTMCNC: 31.4 G/DL (ref 32–36)
MCHC RBC AUTO-ENTMCNC: 31.6 G/DL (ref 32–36)
MCHC RBC AUTO-ENTMCNC: 31.9 G/DL (ref 32–36)
MCHC RBC AUTO-ENTMCNC: 32.9 G/DL (ref 32–36)
MCV RBC AUTO: 94 FL (ref 82–98)
MCV RBC AUTO: 95 FL (ref 82–98)
MCV RBC AUTO: 96 FL (ref 82–98)
MCV RBC AUTO: 97 FL (ref 82–98)
MONOCYTES # BLD AUTO: 0.4 K/UL (ref 0.3–1)
MONOCYTES # BLD AUTO: 0.5 K/UL (ref 0.3–1)
MONOCYTES # BLD AUTO: 0.7 K/UL (ref 0.3–1)
MONOCYTES # BLD AUTO: 0.8 K/UL (ref 0.3–1)
MONOCYTES NFR BLD: 12 % (ref 4–15)
MONOCYTES NFR BLD: 12.4 % (ref 4–15)
MONOCYTES NFR BLD: 6.7 % (ref 4–15)
MONOCYTES NFR BLD: 8.4 % (ref 4–15)
NEUTROPHILS # BLD AUTO: 10.3 K/UL (ref 1.8–7.7)
NEUTROPHILS # BLD AUTO: 3 K/UL (ref 1.8–7.7)
NEUTROPHILS # BLD AUTO: 3.2 K/UL (ref 1.8–7.7)
NEUTROPHILS # BLD AUTO: 4.3 K/UL (ref 1.8–7.7)
NEUTROPHILS NFR BLD: 70.8 % (ref 38–73)
NEUTROPHILS NFR BLD: 74.1 % (ref 38–73)
NEUTROPHILS NFR BLD: 77.5 % (ref 38–73)
NEUTROPHILS NFR BLD: 84.2 % (ref 38–73)
NRBC BLD-RTO: 0 /100 WBC
NT-PROBNP SERPL-MCNC: ABNORMAL PG/ML (ref 5–900)
NUM UNITS TRANS PACKED RBC: NORMAL
NUM UNITS TRANS PACKED RBC: NORMAL
OHS QRS DURATION: 82 MS
OHS QRS DURATION: 84 MS
OHS QRS DURATION: 88 MS
OHS QRS DURATION: 88 MS
OHS QTC CALCULATION: 443 MS
OHS QTC CALCULATION: 447 MS
OHS QTC CALCULATION: 449 MS
OHS QTC CALCULATION: 453 MS
PLATELET # BLD AUTO: 145 K/UL (ref 150–450)
PLATELET # BLD AUTO: 146 K/UL (ref 150–450)
PLATELET # BLD AUTO: 152 K/UL (ref 150–450)
PLATELET # BLD AUTO: 154 K/UL (ref 150–450)
PMV BLD AUTO: 10.4 FL (ref 9.2–12.9)
PMV BLD AUTO: 10.6 FL (ref 9.2–12.9)
PMV BLD AUTO: 10.7 FL (ref 9.2–12.9)
PMV BLD AUTO: 11.4 FL (ref 9.2–12.9)
POCT GLUCOSE: 103 MG/DL (ref 70–110)
POCT GLUCOSE: 74 MG/DL (ref 70–110)
POCT GLUCOSE: 78 MG/DL (ref 70–110)
POTASSIUM SERPL-SCNC: 3.4 MMOL/L (ref 3.5–5.1)
POTASSIUM SERPL-SCNC: 3.7 MMOL/L (ref 3.5–5.1)
PROT SERPL-MCNC: 7.7 G/DL (ref 6–8.4)
PROT SERPL-MCNC: 7.8 G/DL (ref 6–8.4)
PROT SERPL-MCNC: 8.2 G/DL (ref 6–8.4)
RBC # BLD AUTO: 2.26 M/UL (ref 4.6–6.2)
RBC # BLD AUTO: 2.98 M/UL (ref 4.6–6.2)
RBC # BLD AUTO: 3.11 M/UL (ref 4.6–6.2)
RBC # BLD AUTO: 3.39 M/UL (ref 4.6–6.2)
RIGHT VENTRICULAR END-DIASTOLIC DIMENSION: 2.49 CM
SODIUM SERPL-SCNC: 129 MMOL/L (ref 136–145)
SODIUM SERPL-SCNC: 133 MMOL/L (ref 136–145)
SODIUM SERPL-SCNC: 134 MMOL/L (ref 136–145)
SODIUM SERPL-SCNC: 135 MMOL/L (ref 136–145)
SPECIMEN OUTDATE: NORMAL
TROPONIN I SERPL DL<=0.01 NG/ML-MCNC: 433.8 PG/ML (ref 0–60)
TROPONIN I SERPL DL<=0.01 NG/ML-MCNC: 458 PG/ML (ref 0–60)
TROPONIN I SERPL DL<=0.01 NG/ML-MCNC: 459 PG/ML (ref 0–60)
WBC # BLD AUTO: 12.21 K/UL (ref 3.9–12.7)
WBC # BLD AUTO: 4.21 K/UL (ref 3.9–12.7)
WBC # BLD AUTO: 4.31 K/UL (ref 3.9–12.7)
WBC # BLD AUTO: 5.51 K/UL (ref 3.9–12.7)
Z-SCORE OF LEFT VENTRICULAR DIMENSION IN END DIASTOLE: -2.39
Z-SCORE OF LEFT VENTRICULAR DIMENSION IN END SYSTOLE: -0.96

## 2024-01-01 PROCEDURE — 63600175 PHARM REV CODE 636 W HCPCS: Performed by: INTERNAL MEDICINE

## 2024-01-01 PROCEDURE — 83880 ASSAY OF NATRIURETIC PEPTIDE: CPT | Performed by: EMERGENCY MEDICINE

## 2024-01-01 PROCEDURE — 36415 COLL VENOUS BLD VENIPUNCTURE: CPT

## 2024-01-01 PROCEDURE — 27000190 HC CPAP FULL FACE MASK W/VALVE

## 2024-01-01 PROCEDURE — 25000003 PHARM REV CODE 250: Performed by: EMERGENCY MEDICINE

## 2024-01-01 PROCEDURE — 94660 CPAP INITIATION&MGMT: CPT

## 2024-01-01 PROCEDURE — 63600175 PHARM REV CODE 636 W HCPCS

## 2024-01-01 PROCEDURE — 93010 ELECTROCARDIOGRAM REPORT: CPT | Mod: ,,, | Performed by: INTERNAL MEDICINE

## 2024-01-01 PROCEDURE — 85025 COMPLETE CBC W/AUTO DIFF WBC: CPT | Performed by: EMERGENCY MEDICINE

## 2024-01-01 PROCEDURE — G0378 HOSPITAL OBSERVATION PER HR: HCPCS

## 2024-01-01 PROCEDURE — 84484 ASSAY OF TROPONIN QUANT: CPT | Mod: 91

## 2024-01-01 PROCEDURE — 94761 N-INVAS EAR/PLS OXIMETRY MLT: CPT

## 2024-01-01 PROCEDURE — 80053 COMPREHEN METABOLIC PANEL: CPT | Performed by: EMERGENCY MEDICINE

## 2024-01-01 PROCEDURE — 25000003 PHARM REV CODE 250: Performed by: INTERNAL MEDICINE

## 2024-01-01 PROCEDURE — 93010 ELECTROCARDIOGRAM REPORT: CPT | Mod: 76,,, | Performed by: INTERNAL MEDICINE

## 2024-01-01 PROCEDURE — 93308 TTE F-UP OR LMTD: CPT

## 2024-01-01 PROCEDURE — 36415 COLL VENOUS BLD VENIPUNCTURE: CPT | Mod: XB

## 2024-01-01 PROCEDURE — 25000003 PHARM REV CODE 250

## 2024-01-01 PROCEDURE — 93005 ELECTROCARDIOGRAM TRACING: CPT

## 2024-01-01 PROCEDURE — 84484 ASSAY OF TROPONIN QUANT: CPT | Performed by: INTERNAL MEDICINE

## 2024-01-01 PROCEDURE — 25000242 PHARM REV CODE 250 ALT 637 W/ HCPCS: Performed by: EMERGENCY MEDICINE

## 2024-01-01 PROCEDURE — 83735 ASSAY OF MAGNESIUM: CPT

## 2024-01-01 PROCEDURE — 94640 AIRWAY INHALATION TREATMENT: CPT

## 2024-01-01 PROCEDURE — 21400001 HC TELEMETRY ROOM

## 2024-01-01 PROCEDURE — 93010 ELECTROCARDIOGRAM REPORT: CPT | Mod: 77,,, | Performed by: INTERNAL MEDICINE

## 2024-01-01 PROCEDURE — 99900035 HC TECH TIME PER 15 MIN (STAT)

## 2024-01-01 PROCEDURE — 86850 RBC ANTIBODY SCREEN: CPT | Performed by: EMERGENCY MEDICINE

## 2024-01-01 PROCEDURE — 27100171 HC OXYGEN HIGH FLOW UP TO 24 HOURS

## 2024-01-01 PROCEDURE — 99900031 HC PATIENT EDUCATION (STAT)

## 2024-01-01 PROCEDURE — G0257 UNSCHED DIALYSIS ESRD PT HOS: HCPCS

## 2024-01-01 PROCEDURE — 30233N1 TRANSFUSION OF NONAUTOLOGOUS RED BLOOD CELLS INTO PERIPHERAL VEIN, PERCUTANEOUS APPROACH: ICD-10-PCS | Performed by: EMERGENCY MEDICINE

## 2024-01-01 PROCEDURE — 36415 COLL VENOUS BLD VENIPUNCTURE: CPT | Performed by: EMERGENCY MEDICINE

## 2024-01-01 PROCEDURE — 63600175 PHARM REV CODE 636 W HCPCS: Mod: JZ | Performed by: INTERNAL MEDICINE

## 2024-01-01 PROCEDURE — 80053 COMPREHEN METABOLIC PANEL: CPT

## 2024-01-01 PROCEDURE — 27000221 HC OXYGEN, UP TO 24 HOURS

## 2024-01-01 PROCEDURE — 84484 ASSAY OF TROPONIN QUANT: CPT | Performed by: EMERGENCY MEDICINE

## 2024-01-01 PROCEDURE — 99285 EMERGENCY DEPT VISIT HI MDM: CPT | Mod: 25

## 2024-01-01 PROCEDURE — 80048 BASIC METABOLIC PNL TOTAL CA: CPT | Performed by: EMERGENCY MEDICINE

## 2024-01-01 PROCEDURE — 36430 TRANSFUSION BLD/BLD COMPNT: CPT

## 2024-01-01 PROCEDURE — 80100016 HC MAINTENANCE HEMODIALYSIS

## 2024-01-01 PROCEDURE — 85025 COMPLETE CBC W/AUTO DIFF WBC: CPT

## 2024-01-01 PROCEDURE — 5A1D70Z PERFORMANCE OF URINARY FILTRATION, INTERMITTENT, LESS THAN 6 HOURS PER DAY: ICD-10-PCS | Performed by: EMERGENCY MEDICINE

## 2024-01-01 PROCEDURE — P9016 RBC LEUKOCYTES REDUCED: HCPCS | Performed by: EMERGENCY MEDICINE

## 2024-01-01 PROCEDURE — 86920 COMPATIBILITY TEST SPIN: CPT | Performed by: EMERGENCY MEDICINE

## 2024-01-01 PROCEDURE — 63600175 PHARM REV CODE 636 W HCPCS: Performed by: EMERGENCY MEDICINE

## 2024-01-01 RX ORDER — LORAZEPAM 2 MG/ML
0.5 INJECTION INTRAMUSCULAR EVERY 6 HOURS PRN
Status: CANCELLED | OUTPATIENT
Start: 2024-01-01

## 2024-01-01 RX ORDER — ZOLPIDEM TARTRATE 10 MG/1
5 TABLET ORAL NIGHTLY PRN
COMMUNITY

## 2024-01-01 RX ORDER — TALC
6 POWDER (GRAM) TOPICAL NIGHTLY PRN
Status: CANCELLED | OUTPATIENT
Start: 2024-01-01

## 2024-01-01 RX ORDER — FAMOTIDINE 20 MG/1
20 TABLET, FILM COATED ORAL DAILY
Status: CANCELLED | OUTPATIENT
Start: 2024-01-01

## 2024-01-01 RX ORDER — GLUCAGON 1 MG
1 KIT INJECTION
Status: DISCONTINUED | OUTPATIENT
Start: 2024-01-01 | End: 2024-01-01 | Stop reason: HOSPADM

## 2024-01-01 RX ORDER — LORAZEPAM 2 MG/ML
0.5 INJECTION INTRAMUSCULAR EVERY 6 HOURS PRN
Status: DISCONTINUED | OUTPATIENT
Start: 2024-01-01 | End: 2024-01-01 | Stop reason: HOSPADM

## 2024-01-01 RX ORDER — HYDRALAZINE HYDROCHLORIDE 10 MG/1
10 TABLET, FILM COATED ORAL EVERY 6 HOURS PRN
Status: DISCONTINUED | OUTPATIENT
Start: 2024-01-01 | End: 2024-01-01 | Stop reason: HOSPADM

## 2024-01-01 RX ORDER — ATORVASTATIN CALCIUM 40 MG/1
TABLET, FILM COATED ORAL
COMMUNITY
Start: 2023-01-01

## 2024-01-01 RX ORDER — GLUCAGON 1 MG
1 KIT INJECTION
Status: CANCELLED | OUTPATIENT
Start: 2024-01-01

## 2024-01-01 RX ORDER — IBUPROFEN 200 MG
16 TABLET ORAL
Status: DISCONTINUED | OUTPATIENT
Start: 2024-01-01 | End: 2024-01-01 | Stop reason: HOSPADM

## 2024-01-01 RX ORDER — METOPROLOL TARTRATE 25 MG/1
25 TABLET, FILM COATED ORAL 2 TIMES DAILY
Status: DISCONTINUED | OUTPATIENT
Start: 2024-01-01 | End: 2024-01-01 | Stop reason: HOSPADM

## 2024-01-01 RX ORDER — HYDROCODONE BITARTRATE AND ACETAMINOPHEN 7.5; 325 MG/1; MG/1
1 TABLET ORAL EVERY 6 HOURS PRN
Status: DISCONTINUED | OUTPATIENT
Start: 2024-01-01 | End: 2024-01-01 | Stop reason: HOSPADM

## 2024-01-01 RX ORDER — ONDANSETRON 4 MG/1
4 TABLET, FILM COATED ORAL EVERY 8 HOURS PRN
COMMUNITY

## 2024-01-01 RX ORDER — LORAZEPAM 0.5 MG/1
0.5 TABLET ORAL 3 TIMES DAILY PRN
Status: DISCONTINUED | OUTPATIENT
Start: 2024-01-01 | End: 2024-01-01 | Stop reason: HOSPADM

## 2024-01-01 RX ORDER — MUPIROCIN 20 MG/G
OINTMENT TOPICAL 2 TIMES DAILY
Status: CANCELLED | OUTPATIENT
Start: 2024-01-01 | End: 2024-01-01

## 2024-01-01 RX ORDER — INSULIN ASPART 100 [IU]/ML
0-5 INJECTION, SOLUTION INTRAVENOUS; SUBCUTANEOUS
Status: CANCELLED | OUTPATIENT
Start: 2024-01-01

## 2024-01-01 RX ORDER — HYDRALAZINE HYDROCHLORIDE 50 MG/1
50 TABLET, FILM COATED ORAL EVERY 12 HOURS
Status: DISCONTINUED | OUTPATIENT
Start: 2024-01-01 | End: 2024-01-01 | Stop reason: HOSPADM

## 2024-01-01 RX ORDER — MUPIROCIN 20 MG/G
OINTMENT TOPICAL 2 TIMES DAILY
Status: DISCONTINUED | OUTPATIENT
Start: 2024-01-01 | End: 2024-01-01 | Stop reason: HOSPADM

## 2024-01-01 RX ORDER — TALC
6 POWDER (GRAM) TOPICAL NIGHTLY PRN
Status: DISCONTINUED | OUTPATIENT
Start: 2024-01-01 | End: 2024-01-01 | Stop reason: HOSPADM

## 2024-01-01 RX ORDER — HYDRALAZINE HYDROCHLORIDE 50 MG/1
50 TABLET, FILM COATED ORAL EVERY 12 HOURS
Status: CANCELLED | OUTPATIENT
Start: 2024-01-01

## 2024-01-01 RX ORDER — ACETAMINOPHEN 325 MG/1
650 TABLET ORAL EVERY 8 HOURS PRN
Status: DISCONTINUED | OUTPATIENT
Start: 2024-01-01 | End: 2024-01-01 | Stop reason: HOSPADM

## 2024-01-01 RX ORDER — PROCHLORPERAZINE EDISYLATE 5 MG/ML
2.5 INJECTION INTRAMUSCULAR; INTRAVENOUS EVERY 6 HOURS PRN
Status: CANCELLED | OUTPATIENT
Start: 2024-01-01

## 2024-01-01 RX ORDER — HYDRALAZINE HYDROCHLORIDE 20 MG/ML
10 INJECTION INTRAMUSCULAR; INTRAVENOUS ONCE
Status: COMPLETED | OUTPATIENT
Start: 2024-01-01 | End: 2024-01-01

## 2024-01-01 RX ORDER — PROCHLORPERAZINE EDISYLATE 5 MG/ML
2.5 INJECTION INTRAMUSCULAR; INTRAVENOUS EVERY 6 HOURS PRN
Status: DISCONTINUED | OUTPATIENT
Start: 2024-01-01 | End: 2024-01-01 | Stop reason: HOSPADM

## 2024-01-01 RX ORDER — HYDRALAZINE HYDROCHLORIDE 20 MG/ML
10 INJECTION INTRAMUSCULAR; INTRAVENOUS EVERY 6 HOURS PRN
Status: DISCONTINUED | OUTPATIENT
Start: 2024-01-01 | End: 2024-01-01 | Stop reason: HOSPADM

## 2024-01-01 RX ORDER — SODIUM CHLORIDE 0.9 % (FLUSH) 0.9 %
10 SYRINGE (ML) INJECTION
Status: CANCELLED | OUTPATIENT
Start: 2024-01-01

## 2024-01-01 RX ORDER — HYDROCODONE BITARTRATE AND ACETAMINOPHEN 5; 325 MG/1; MG/1
1 TABLET ORAL
Status: COMPLETED | OUTPATIENT
Start: 2024-01-01 | End: 2024-01-01

## 2024-01-01 RX ORDER — HYDROCODONE BITARTRATE AND ACETAMINOPHEN 500; 5 MG/1; MG/1
TABLET ORAL
Status: DISCONTINUED | OUTPATIENT
Start: 2024-01-01 | End: 2024-01-01 | Stop reason: HOSPADM

## 2024-01-01 RX ORDER — FAMOTIDINE 20 MG/1
20 TABLET, FILM COATED ORAL DAILY
Status: DISCONTINUED | OUTPATIENT
Start: 2024-01-01 | End: 2024-01-01 | Stop reason: HOSPADM

## 2024-01-01 RX ORDER — ACETAMINOPHEN 325 MG/1
650 TABLET ORAL EVERY 8 HOURS PRN
Status: CANCELLED | OUTPATIENT
Start: 2024-01-01

## 2024-01-01 RX ORDER — LACTULOSE 10 G/15ML
SOLUTION ORAL; RECTAL DAILY
COMMUNITY

## 2024-01-01 RX ORDER — ONDANSETRON HYDROCHLORIDE 2 MG/ML
4 INJECTION, SOLUTION INTRAVENOUS EVERY 8 HOURS PRN
Status: DISCONTINUED | OUTPATIENT
Start: 2024-01-01 | End: 2024-01-01 | Stop reason: HOSPADM

## 2024-01-01 RX ORDER — SODIUM CHLORIDE 0.9 % (FLUSH) 0.9 %
10 SYRINGE (ML) INJECTION
Status: DISCONTINUED | OUTPATIENT
Start: 2024-01-01 | End: 2024-01-01 | Stop reason: HOSPADM

## 2024-01-01 RX ORDER — IPRATROPIUM BROMIDE AND ALBUTEROL SULFATE 2.5; .5 MG/3ML; MG/3ML
3 SOLUTION RESPIRATORY (INHALATION) ONCE
Status: COMPLETED | OUTPATIENT
Start: 2024-01-01 | End: 2024-01-01

## 2024-01-01 RX ORDER — HYDROCODONE BITARTRATE AND ACETAMINOPHEN 7.5; 325 MG/1; MG/1
1 TABLET ORAL EVERY 6 HOURS PRN
Status: CANCELLED | OUTPATIENT
Start: 2024-01-01

## 2024-01-01 RX ORDER — TRAZODONE HYDROCHLORIDE 50 MG/1
50 TABLET ORAL NIGHTLY
COMMUNITY

## 2024-01-01 RX ORDER — IBUPROFEN 200 MG
24 TABLET ORAL
Status: DISCONTINUED | OUTPATIENT
Start: 2024-01-01 | End: 2024-01-01 | Stop reason: HOSPADM

## 2024-01-01 RX ORDER — AMLODIPINE BESYLATE 10 MG/1
10 TABLET ORAL DAILY
Status: DISCONTINUED | OUTPATIENT
Start: 2024-01-01 | End: 2024-01-01 | Stop reason: HOSPADM

## 2024-01-01 RX ORDER — INSULIN ASPART 100 [IU]/ML
0-5 INJECTION, SOLUTION INTRAVENOUS; SUBCUTANEOUS
Status: DISCONTINUED | OUTPATIENT
Start: 2024-01-01 | End: 2024-01-01 | Stop reason: HOSPADM

## 2024-01-01 RX ORDER — HYDROCODONE BITARTRATE AND ACETAMINOPHEN 500; 5 MG/1; MG/1
TABLET ORAL
Status: CANCELLED | OUTPATIENT
Start: 2024-01-01

## 2024-01-01 RX ORDER — ONDANSETRON HYDROCHLORIDE 2 MG/ML
4 INJECTION, SOLUTION INTRAVENOUS EVERY 8 HOURS PRN
Status: CANCELLED | OUTPATIENT
Start: 2024-01-01

## 2024-01-01 RX ORDER — ASCORBIC ACID 500 MG
500 TABLET ORAL DAILY
COMMUNITY

## 2024-01-01 RX ORDER — HYDRALAZINE HYDROCHLORIDE 20 MG/ML
10 INJECTION INTRAMUSCULAR; INTRAVENOUS EVERY 6 HOURS PRN
Status: CANCELLED | OUTPATIENT
Start: 2024-01-01

## 2024-01-01 RX ORDER — SODIUM CHLORIDE 9 MG/ML
INJECTION, SOLUTION INTRAVENOUS ONCE
Status: CANCELLED | OUTPATIENT
Start: 2024-01-01 | End: 2024-01-01

## 2024-01-01 RX ORDER — VALSARTAN 80 MG/1
320 TABLET ORAL DAILY
Status: CANCELLED | OUTPATIENT
Start: 2024-01-01

## 2024-01-01 RX ORDER — AMLODIPINE BESYLATE 10 MG/1
10 TABLET ORAL DAILY
Status: CANCELLED | OUTPATIENT
Start: 2024-01-01

## 2024-01-01 RX ORDER — IBUPROFEN 200 MG
16 TABLET ORAL
Status: CANCELLED | OUTPATIENT
Start: 2024-01-01

## 2024-01-01 RX ORDER — IBUPROFEN 200 MG
24 TABLET ORAL
Status: CANCELLED | OUTPATIENT
Start: 2024-01-01

## 2024-01-01 RX ORDER — VALSARTAN 80 MG/1
320 TABLET ORAL DAILY
Status: DISCONTINUED | OUTPATIENT
Start: 2024-01-01 | End: 2024-01-01 | Stop reason: HOSPADM

## 2024-01-01 RX ADMIN — FAMOTIDINE 20 MG: 20 TABLET ORAL at 08:02

## 2024-01-01 RX ADMIN — NITROGLYCERIN 0.5 INCH: 20 OINTMENT TOPICAL at 12:02

## 2024-01-01 RX ADMIN — NITROGLYCERIN 1 INCH: 20 OINTMENT TOPICAL at 10:02

## 2024-01-01 RX ADMIN — HYDRALAZINE HYDROCHLORIDE 50 MG: 50 TABLET, FILM COATED ORAL at 11:02

## 2024-01-01 RX ADMIN — LORAZEPAM 0.5 MG: 2 INJECTION INTRAMUSCULAR; INTRAVENOUS at 06:02

## 2024-01-01 RX ADMIN — HYDRALAZINE HYDROCHLORIDE 50 MG: 50 TABLET, FILM COATED ORAL at 08:02

## 2024-01-01 RX ADMIN — VALSARTAN 320 MG: 80 TABLET, FILM COATED ORAL at 08:02

## 2024-01-01 RX ADMIN — MUPIROCIN: 20 OINTMENT TOPICAL at 08:02

## 2024-01-01 RX ADMIN — AMLODIPINE BESYLATE 10 MG: 10 TABLET ORAL at 08:02

## 2024-01-01 RX ADMIN — NITROGLYCERIN 0.5 INCH: 20 OINTMENT TOPICAL at 06:02

## 2024-01-01 RX ADMIN — LORAZEPAM 0.5 MG: 2 INJECTION INTRAMUSCULAR; INTRAVENOUS at 12:02

## 2024-01-01 RX ADMIN — Medication 6 MG: at 11:02

## 2024-01-01 RX ADMIN — HYDRALAZINE HYDROCHLORIDE 10 MG: 20 INJECTION, SOLUTION INTRAMUSCULAR; INTRAVENOUS at 12:02

## 2024-01-01 RX ADMIN — HYDROCODONE BITARTRATE AND ACETAMINOPHEN 1 TABLET: 7.5; 325 TABLET ORAL at 04:02

## 2024-01-01 RX ADMIN — IRON SUCROSE 100 MG: 20 INJECTION, SOLUTION INTRAVENOUS at 01:02

## 2024-01-01 RX ADMIN — HYDROCODONE BITARTRATE AND ACETAMINOPHEN 1 TABLET: 7.5; 325 TABLET ORAL at 08:02

## 2024-01-01 RX ADMIN — NITROGLYCERIN 0.5 INCH: 20 OINTMENT TOPICAL at 11:02

## 2024-01-01 RX ADMIN — IPRATROPIUM BROMIDE AND ALBUTEROL SULFATE 3 ML: .5; 3 SOLUTION RESPIRATORY (INHALATION) at 09:04

## 2024-01-01 RX ADMIN — ERYTHROPOIETIN 10000 UNITS: 10000 INJECTION, SOLUTION INTRAVENOUS; SUBCUTANEOUS at 01:02

## 2024-01-01 RX ADMIN — PROCHLORPERAZINE EDISYLATE 2.5 MG: 5 INJECTION INTRAMUSCULAR; INTRAVENOUS at 09:02

## 2024-01-01 RX ADMIN — NITROGLYCERIN 0.5 INCH: 20 OINTMENT TOPICAL at 05:02

## 2024-01-01 RX ADMIN — MUPIROCIN: 20 OINTMENT TOPICAL at 09:02

## 2024-01-01 RX ADMIN — METOPROLOL TARTRATE 25 MG: 25 TABLET, FILM COATED ORAL at 09:04

## 2024-01-01 RX ADMIN — HYDROCODONE BITARTRATE AND ACETAMINOPHEN 1 TABLET: 5; 325 TABLET ORAL at 01:04

## 2024-01-01 RX ADMIN — HYDRALAZINE HYDROCHLORIDE 10 MG: 20 INJECTION INTRAMUSCULAR; INTRAVENOUS at 11:02

## 2024-01-01 RX ADMIN — ONDANSETRON 4 MG: 2 INJECTION INTRAMUSCULAR; INTRAVENOUS at 03:02

## 2024-01-01 RX ADMIN — LORAZEPAM 0.5 MG: 0.5 TABLET ORAL at 09:04

## 2024-01-01 RX ADMIN — HYDRALAZINE HYDROCHLORIDE 10 MG: 10 TABLET, FILM COATED ORAL at 09:04

## 2024-01-01 RX ADMIN — HYDRALAZINE HYDROCHLORIDE 10 MG: 20 INJECTION, SOLUTION INTRAMUSCULAR; INTRAVENOUS at 07:02

## 2024-01-01 RX ADMIN — HYDRALAZINE HYDROCHLORIDE 10 MG: 20 INJECTION, SOLUTION INTRAMUSCULAR; INTRAVENOUS at 05:02

## 2024-02-08 NOTE — ED PROVIDER NOTES
"Encounter Date: 2/8/2024       History     Chief Complaint   Patient presents with    Chest Pain     Pt stated that he has been experiencing intermittent chest pain for the past couple weeks. Stents placed approx month ago. Dialysis patient.      Pt presents to the ED for for SOB.  He states that this has been ongoing for them past 3 weeks.  He is on dialysis MWF.  Pt has not had a change in his sxs.  He tells me that he has "fluid" around his lung that has been there for several months.  He does not need to use O2 at home.  Pt has not had a fever.  He denies any cough.  Pt reports nausea w/o vomiting at this time.       The history is provided by the patient.     Review of patient's allergies indicates:  No Known Allergies  Past Medical History:   Diagnosis Date    Chronic kidney disease (CKD) 02/10/2020    CKD (chronic kidney disease) stage 3, GFR 30-59 ml/min 10/16/2014    CKD (chronic kidney disease), stage V 01/20/2020    COPD (chronic obstructive pulmonary disease)     Coronary artery disease     COVID-19 01/03/2022    Diabetes mellitus     Dialysis patient     05/03/2023 LAST    Dysphagia 10/2021    Encounter for blood transfusion     Hypertension     Insomnia     PAD (peripheral artery disease)     Weight loss 10/2021     Past Surgical History:   Procedure Laterality Date    BACK SURGERY      KNEE SURGERY Left     TKR    PLACEMENT OF ARTERIOVENOUS GRAFT Left 02/10/2020    Procedure: INSERTION, GRAFT, ARTERIOVENOUS;  Surgeon: Artie Joshi MD;  Location: [facility];  Service: Cardiovascular;  Laterality: Left;    SPINAL FUSION N/A 11/07/2021    Procedure: FUSION, SPINE;  Surgeon: Vadim Lim MD;  Location: [address];  Service: Orthopedics;  Laterality: N/A;  ORIF T12 Fracture with T10-L2 Fusion, T11 and T12 laminectomy  [employer]  SNS: [employer]/SSE[employer]  [place] + pads  Craniotome and M8     Family History   Problem Relation Age of Onset    Diabetes Mother 83    No Known Problems Father     No Known " Problems Sister     No Known Problems Brother     No Known Problems Brother     No Known Problems Brother     No Known Problems Brother      Social History     Tobacco Use    Smoking status: Former     Current packs/day: 1.00     Average packs/day: 1 pack/day for 20.0 years (20.0 ttl pk-yrs)     Types: Cigars, Cigarettes    Smokeless tobacco: Never    Tobacco comments:     Also reports 5-6 yrs of smoking cigars   Substance Use Topics    Alcohol use: No    Drug use: No     Review of Systems   Constitutional:  Positive for fatigue. Negative for chills and fever.   Respiratory:  Positive for shortness of breath. Negative for cough.    Cardiovascular:  Negative for chest pain.   Gastrointestinal:  Positive for nausea. Negative for anal bleeding and vomiting.   All other systems reviewed and are negative.      Physical Exam     Initial Vitals   BP Pulse Resp Temp SpO2   02/08/24 1607 02/08/24 1607 02/08/24 1607 02/08/24 1613 02/08/24 1613   (!) 140/65 70 (!) 29 98.6 °F (37 °C) (!) 94 %      MAP       --                Physical Exam    Nursing note and vitals reviewed.  Constitutional: He appears well-developed and well-nourished. No distress.   HENT:   Head: Normocephalic and atraumatic.   Eyes: EOM are normal. Pupils are equal, round, and reactive to light.   Neck: Neck supple.   Normal range of motion.  Cardiovascular:  Normal rate, regular rhythm and normal heart sounds.           No murmur heard.  Pulmonary/Chest: Tachypnea noted. No respiratory distress. He has decreased breath sounds in the left upper field, the left middle field and the left lower field. He has no wheezes. He has no rhonchi. He has no rales.   Patient is using pursed lip breathing.   Abdominal: Abdomen is soft. Bowel sounds are normal. He exhibits no distension. There is no abdominal tenderness. There is no rebound.   Musculoskeletal:         General: Normal range of motion.      Cervical back: Normal range of motion and neck supple.      Neurological: He is alert and oriented to person, place, and time. GCS score is 15. GCS eye subscore is 4. GCS verbal subscore is 5. GCS motor subscore is 6.   Skin: Skin is warm and dry. Capillary refill takes less than 2 seconds.   Psychiatric: He has a normal mood and affect. His behavior is normal. Thought content normal.         ED Course   Procedures  Labs Reviewed   CBC W/ AUTO DIFFERENTIAL - Abnormal; Notable for the following components:       Result Value    RBC 2.26 (*)     Hemoglobin 6.9 (*)     Hematocrit 22.0 (*)     MCHC 31.4 (*)     RDW 15.8 (*)     Platelets 146 (*)     Immature Granulocytes 0.7 (*)     Lymph # 0.6 (*)     Lymph % 14.7 (*)     All other components within normal limits   COMPREHENSIVE METABOLIC PANEL - Abnormal; Notable for the following components:    Sodium 135 (*)     Potassium 3.4 (*)     CO2 32 (*)     Creatinine 5.5 (*)     Calcium 7.9 (*)     Albumin 2.4 (*)     eGFR 10.7 (*)     All other components within normal limits   NT-PRO NATRIURETIC PEPTIDE - Abnormal; Notable for the following components:    NT-proBNP 11227 (*)     All other components within normal limits   TROPONIN I HIGH SENSITIVITY - Abnormal; Notable for the following components:    Troponin I High Sensitivity 433.8 (*)     All other components within normal limits   TYPE & SCREEN     EKG Readings: (Independently Interpreted)   Initial Reading: No STEMI. Rhythm: Normal Sinus Rhythm. Heart Rate: 70. Ectopy: No Ectopy. Conduction: Normal. T Waves Flipped: V4, V5 and V6. Axis: Normal. Clinical Impression: Normal Sinus Rhythm Other Impression: Abnormal without acute findings       Imaging Results              X-Ray Chest 1 View (In process)                   X-Rays:   Independently Interpreted Readings:   Other Readings:  Large left-sided pleural effusion    Medications - No data to display  Medical Decision Making  Amount and/or Complexity of Data Reviewed  Labs: ordered.  Radiology: ordered.               ED  Course as of 02/08/24 1845   Thu Feb 08, 2024 1825 Hand off to Dr. Santa.  We have discussed the pt and continued treatment plan.    [PM]      ED Course User Index  [PM] Ag Ward MD               Medical Decision Making:   Differential Diagnosis:   Discussed case with mendez - will admit for 2 units of packed red blood cells, surgical consult for effusion             Clinical Impression:  Final diagnoses:  [R06.02] SOB (shortness of breath)  [J90] Pleural effusion on left  [D64.9] Anemia, unspecified type (Primary)          ED Disposition Condition    Observation Stable                Chris Santa MD  02/08/24 6126

## 2024-02-09 NOTE — ASSESSMENT & PLAN NOTE
Patient's anemia is currently uncontrolled. Has received 1 of 2 units of PRBCs on 02/09/2024 . Etiology likely d/t chronic disease due to Chronic Kidney Disease/ESRD  Current CBC reviewed-   Lab Results   Component Value Date    HGB 6.9 (L) 02/08/2024    HCT 22.0 (L) 02/08/2024     Monitor serial CBC and transfuse if patient becomes hemodynamically unstable, symptomatic or H/H drops below 7/21.

## 2024-02-09 NOTE — SUBJECTIVE & OBJECTIVE
Past Medical History:   Diagnosis Date    Chronic kidney disease (CKD) 02/10/2020    CKD (chronic kidney disease) stage 3, GFR 30-59 ml/min 10/16/2014    CKD (chronic kidney disease), stage V 01/20/2020    COPD (chronic obstructive pulmonary disease)     Coronary artery disease     COVID-19 01/03/2022    Diabetes mellitus     Dialysis patient     05/03/2023 LAST    Dysphagia 10/2021    Encounter for blood transfusion     Hypertension     Insomnia     PAD (peripheral artery disease)     Weight loss 10/2021       Past Surgical History:   Procedure Laterality Date    BACK SURGERY      KNEE SURGERY Left     TKR    PLACEMENT OF ARTERIOVENOUS GRAFT Left 02/10/2020    Procedure: INSERTION, GRAFT, ARTERIOVENOUS;  Surgeon: Artie Joshi MD;  Location: [facility] OR;  Service: Cardiovascular;  Laterality: Left;    SPINAL FUSION N/A 11/07/2021    Procedure: FUSION, SPINE;  Surgeon: Vadim Lim MD;  [facility]: [facility] OR [facility];  Service: Orthopedics;  Laterality: N/A;  ORIF T12 Fracture with T10-L2 Fusion, T11 and T12 laminectomy  [facility]  SNS: [employer]  [place] + pads  Craniotome and M8       Review of patient's allergies indicates:  No Known Allergies    Current Facility-Administered Medications on File Prior to Encounter   Medication    0.9%  NaCl infusion    lidocaine (PF) 10 mg/ml (1%) injection 10 mg    ondansetron injection 4 mg     Current Outpatient Medications on File Prior to Encounter   Medication Sig    amLODIPine (NORVASC) 10 MG tablet Take 1 tablet (10 mg total) by mouth once daily.    hydrALAZINE (APRESOLINE) 50 MG tablet Take 1 tablet (50 mg total) by mouth every 12 (twelve) hours.    HYDROcodone-acetaminophen (NORCO) 7.5-325 mg per tablet Take 1 tablet by mouth every 6 (six) hours as needed for Pain.    lactulose (CHRONULAC) 20 gram/30 mL Soln Take 45 mLs (30 g total) by mouth 3 (three) times daily as needed (Constipation).    LIDOcaine (LIDODERM) 5 % Place 1 patch onto the skin once daily. Remove &  Discard patch within 12 hours or as directed by MD    nebivoloL (BYSTOLIC) 10 MG Tab Take 10 mg by mouth once daily.    olmesartan (BENICAR) 40 MG tablet Take 40 mg by mouth.    polyethylene glycol (GLYCOLAX) 17 gram PwPk Take 17 g by mouth once daily.    zolpidem (AMBIEN) 10 mg Tab TAKE 1 TABLET BY MOUTH EVERY DAY AT BEDTIME AS NEEDED    HYDROcodone-acetaminophen (NORCO)  mg per tablet Take 1 tablet by mouth every 6 (six) hours as needed for Pain.     Family History       Problem Relation (Age of Onset)    Diabetes Mother (83)    No Known Problems Father, Sister, Brother, Brother, Brother, Brother          Tobacco Use    Smoking status: Former     Current packs/day: 1.00     Average packs/day: 1 pack/day for 20.0 years (20.0 ttl pk-yrs)     Types: Cigars, Cigarettes    Smokeless tobacco: Never    Tobacco comments:     Also reports 5-6 yrs of smoking cigars   Substance and Sexual Activity    Alcohol use: No    Drug use: No    Sexual activity: Yes     Partners: Female     Review of Systems   Constitutional:  Positive for activity change and fatigue. Negative for appetite change, chills, diaphoresis and fever.   HENT:  Negative for congestion, ear pain, facial swelling, postnasal drip, rhinorrhea, sinus pain, sore throat and trouble swallowing.    Eyes:  Negative for photophobia, pain, redness and visual disturbance.   Respiratory:  Positive for shortness of breath. Negative for cough, chest tightness and wheezing.    Cardiovascular:  Negative for chest pain, palpitations and leg swelling.   Gastrointestinal:  Positive for nausea. Negative for abdominal distention, abdominal pain, blood in stool, constipation, diarrhea and vomiting.   Endocrine: Negative for polydipsia, polyphagia and polyuria.   Genitourinary:  Negative for difficulty urinating, dysuria, flank pain, frequency, hematuria, penile pain, testicular pain and urgency.   Musculoskeletal:  Negative for arthralgias, myalgias and neck stiffness.   Skin:   Negative for pallor, rash and wound.   Allergic/Immunologic: Negative.    Neurological:  Negative for dizziness, tremors, seizures, syncope, weakness, light-headedness and headaches.   Hematological: Negative.    Psychiatric/Behavioral:  Negative for agitation, confusion and sleep disturbance. The patient is not nervous/anxious.      Objective:     Vital Signs (Most Recent):  Temp: 97.6 °F (36.4 °C) (02/09/24 0719)  Pulse: 63 (02/09/24 0835)  Resp: 20 (02/09/24 0825)  BP: (!) 177/82 (02/09/24 0719)  SpO2: 100 % (02/09/24 0825) Vital Signs (24h Range):  Temp:  [97.5 °F (36.4 °C)-98.7 °F (37.1 °C)] 97.6 °F (36.4 °C)  Pulse:  [60-70] 63  Resp:  [19-32] 20  SpO2:  [92 %-100 %] 100 %  BP: (120-188)/(60-90) 177/82     Weight: 76.5 kg (168 lb 9.6 oz)  Body mass index is 22.87 kg/m².     Physical Exam  Vitals and nursing note reviewed.   Constitutional:       General: He is not in acute distress.     Appearance: Normal appearance. He is ill-appearing.   HENT:      Head: Normocephalic and atraumatic.      Nose: Nose normal. No congestion or rhinorrhea.      Mouth/Throat:      Mouth: Mucous membranes are moist.      Pharynx: Oropharynx is clear. No oropharyngeal exudate or posterior oropharyngeal erythema.   Eyes:      General: No scleral icterus.     Extraocular Movements: Extraocular movements intact.      Conjunctiva/sclera: Conjunctivae normal.      Pupils: Pupils are equal, round, and reactive to light.   Neck:      Vascular: JVD present. No carotid bruit.   Cardiovascular:      Rate and Rhythm: Normal rate and regular rhythm.      Pulses: Normal pulses.      Heart sounds: Normal heart sounds. No murmur heard.  Pulmonary:      Effort: Pulmonary effort is normal. Tachypnea present. No respiratory distress.      Breath sounds: Examination of the left-upper field reveals decreased breath sounds. Examination of the left-middle field reveals decreased breath sounds. Examination of the left-lower field reveals decreased breath  sounds. Decreased breath sounds present. No wheezing, rhonchi or rales.      Comments: Pursed lip breathing.  Speaking broken sentences.   Abdominal:      General: Bowel sounds are normal. There is no distension.      Palpations: Abdomen is soft.      Tenderness: There is no abdominal tenderness. There is no guarding or rebound.   Musculoskeletal:         General: Normal range of motion.      Cervical back: Normal range of motion and neck supple.      Right lower leg: No edema.      Left lower leg: No edema.   Lymphadenopathy:      Cervical: No cervical adenopathy.   Skin:     General: Skin is warm and dry.      Capillary Refill: Capillary refill takes less than 2 seconds.   Neurological:      General: No focal deficit present.      Mental Status: He is alert and oriented to person, place, and time.      Cranial Nerves: No cranial nerve deficit.      Motor: No weakness.   Psychiatric:         Mood and Affect: Mood normal.         Behavior: Behavior normal.         Thought Content: Thought content normal.         Judgment: Judgment normal.              CRANIAL NERVES     CN III, IV, VI   Pupils are equal, round, and reactive to light.       Significant Labs: All pertinent labs within the past 24 hours have been reviewed.  Recent Lab Results         02/08/24  1802   02/08/24  1801   02/08/24  1709        Unit Blood Type Code 9500  [P]            9500  [P]           Unit Expiration [id number]  [P]            [id number]  [P]           Unit Blood Type O NEG  [P]            O NEG  [P]           Albumin     2.4       ALP     62       ALT     20       Anion Gap     4       AST     34       Baso #     0.01       Basophil %     0.2       BILIRUBIN TOTAL     0.5  Comment: For infants and newborns, interpretation of results should be based  on gestational age, weight and in agreement with clinical  observations.    Premature Infant recommended reference ranges:  Up to 24 hours.............<8.0 mg/dL  Up to 48  hours............<12.0 mg/dL  3-5 days..................<15.0 mg/dL  6-29 days.................<15.0 mg/dL    For patients on Eltrombopag therapy, use of Dimension [place] TBIL is   not   recommended.         BUN     17       Calcium     7.9       Chloride     99       CO2     32       CODING SYSTEM IPOP527  [P]            [id number]  [P]           Creatinine     5.5       Crossmatch Interpretation Compatible  [P]            Compatible  [P]           Differential Method     Automated       DISPENSE STATUS ISSUED  [P]            CROSSMATCHED  [P]           eGFR     10.7       Eos #     0.1       Eos %     1.2       Glucose     104       Gran # (ANC)     3.0       Gran %     70.8       Group & Rh O NEG           Hematocrit     22.0       Hemoglobin     6.9       Immature Grans (Abs)     0.03  Comment: Mild elevation in immature granulocytes is non specific and   can be seen in a variety of conditions including stress response,   acute inflammation, trauma and pregnancy. Correlation with other   laboratory and clinical findings is essential.         Immature Granulocytes     0.7       INDIRECT KIMBERLYN NEG           Lymph #     0.6       Lymph %     14.7       MCH     30.5       MCHC     31.4       MCV     97       Mono #     0.5       Mono %     12.4       MPV     10.6       nRBC     0       NT-proBNP     47356       Platelet Count     146       Potassium     3.4       Product Code I9919A70  [P]            [id number]  [P]           PROTEIN TOTAL     7.8       RBC     2.26       RDW     15.8       Sodium     135       Specimen Outdate 02/11/2024 23:59           Troponin I High Sensitivity   433.8         UNIT NUMBER [id number]  [P]            [id number]  [P]           WBC     4.21                [P] - Preliminary Result               Significant Imaging: I have reviewed all pertinent imaging results/findings within the past 24 hours.

## 2024-02-09 NOTE — HPI
"ED HPI:    Chief Complaint   Patient presents with    Chest Pain       Pt stated that he has been experiencing intermittent chest pain for the past couple weeks. Stents placed approx month ago. Dialysis patient.       Pt presents to the ED for for SOB.  He states that this has been ongoing for them past 3 weeks.  He is on dialysis MWF.  Pt has not had a change in his sxs.  He tells me that he has "fluid" around his lung that has been there for several months.  He does not need to use O2 at home.  Pt has not had a fever.  He denies any cough.  Pt reports nausea w/o vomiting at this time.    IM HPI:  Agree with above.  Patient with increased shortness of breath this morning.  He has received 1 of 2 units PRBC.  We will receive 2nd unit in dialysis today.  Nephrology consulted for continued hemodialysis during admission.  Appreciate recs.  Preliminary chest X ray review findings revealed possible large left pleural effusion.  Waiting for official read.  We will review patient's chart determine if new or worsening.  Patient with worsening shortness a breath.  Speaking in broken sentences.  We obtain CT chest and echocardiogram today.  Cardiology consulted.  Appreciate recs.  General surgery consulted for pleural effusion management.  Continue supplemental O2 via nasal cannula.  Creatinine slightly elevated above baseline.  Plan for dialysis today.  Patient noted to elevated blood pressure.  We will resume home medications post dialysis.  We will repeat CBC post transfusion.  "

## 2024-02-09 NOTE — CONSULTS
[facility] Surg  Cardiology  Consult Note    Patient Name: Kevin Sanon  MRN: [id number]  Admission Date: 2/8/2024  Hospital Length of Stay: 0 days  Code Status: Full Code   Attending Provider: Chencho Frank III, MD   Consulting Provider: Franklin Plata MD  Primary Care Physician: Chencho Frank III, MD  Principal Problem:Anemia in ESRD (end-stage renal disease)    Patient information was obtained from patient, past medical records, and ER records.     Inpatient consult to Cardiology  Consult performed by: Shantal Cervantes PA-C  Consult ordered by: Meena Tripathi NP      Consult performed by: Franklin Plata MD  Subjective:     Chief Complaint:  shortness of breath      HPI:   This is a 69 yo male with pmhx CAD s/p PCI 11/2023, PAD, ESRD on HD, HTN, HLD.    He presented to the emergency department with worsening shortness of breath over the last 3 weeks. On admission, he was noted to be anemic and received 2 units pRBCs. Nephrology consulted and following for ESRD on HD - pt is currently receiving dialysis during evaluation. General surgery has also been consulted to evaluate pleural effusion for management and treatment options - pt has been referred to a pulmonologist for this previously by is [facility] team; however, he states the appointment is not until mid-end of March.    He voices he concerns stating he is feeling chest pain that feels like indigestion and is short of breath with conversation. He is skipping words when speaking to catch his breath. STAT 12 lead EKG and HS troponin ordered. 12-lead EKG revealing no acute ST changes. Previous drawn troponin elevated at 459 - will repeat given his concerning chest pain at present. His antihypertensives will be resumed after dialysis completion.     Of note, pt had angiogram with intervention in November 2023. Prior to the angiogram, he had a fall which was evaluate with a CXR showing a left pleural effusion that was stable. He has been following  with [facility] team for renal transplant who referred him to pulmonologist for further evaluation and treatment of his chronic pulmonary issues.     At his post angiogram visit (12/2023), he c/o hypotensive episodes after taking all his antihypertensives together. So, his regimen was adjusted to include:   Olmesartan 40 mg po qd   Nifedipine 90 mg po qd   Nebivolol 20 mg po qd   Hydralazine was held.   Amlodipine was discontinues as he was also taking nifedipine.     Past Medical History:   Diagnosis Date    Chronic kidney disease (CKD) 02/10/2020    CKD (chronic kidney disease) stage 3, GFR 30-59 ml/min 10/16/2014    CKD (chronic kidney disease), stage V 01/20/2020    COPD (chronic obstructive pulmonary disease)     Coronary artery disease     COVID-19 01/03/2022    Diabetes mellitus     Dialysis patient     05/03/2023 LAST    Dysphagia 10/2021    Encounter for blood transfusion     Hypertension     Insomnia     PAD (peripheral artery disease)     Weight loss 10/2021       Past Surgical History:   Procedure Laterality Date    BACK SURGERY      KNEE SURGERY Left     TKR    PLACEMENT OF ARTERIOVENOUS GRAFT Left 02/10/2020    Procedure: INSERTION, GRAFT, ARTERIOVENOUS;  Surgeon: Artie Joshi MD;  Location: [facility];  Service: Cardiovascular;  Laterality: Left;    SPINAL FUSION N/A 11/07/2021    Procedure: FUSION, SPINE;  Surgeon: Vadim Lim MD;  Location: [address];  Service: Orthopedics;  Laterality: N/A;  ORIF T12 Fracture with T10-L2 Fusion, T11 and T12 laminectomy  [facility]  SNS: [employer]  [place] + pads  Craniotome and M8       Review of patient's allergies indicates:  No Known Allergies    Current Facility-Administered Medications on File Prior to Encounter   Medication    0.9%  NaCl infusion    lidocaine (PF) 10 mg/ml (1%) injection 10 mg    ondansetron injection 4 mg     Current Outpatient Medications on File Prior to Encounter   Medication Sig    amLODIPine (NORVASC) 10 MG tablet Take 1  tablet (10 mg total) by mouth once daily.    hydrALAZINE (APRESOLINE) 50 MG tablet Take 1 tablet (50 mg total) by mouth every 12 (twelve) hours.    HYDROcodone-acetaminophen (NORCO) 7.5-325 mg per tablet Take 1 tablet by mouth every 6 (six) hours as needed for Pain.    lactulose (CHRONULAC) 20 gram/30 mL Soln Take 45 mLs (30 g total) by mouth 3 (three) times daily as needed (Constipation).    LIDOcaine (LIDODERM) 5 % Place 1 patch onto the skin once daily. Remove & Discard patch within 12 hours or as directed by MD    nebivoloL (BYSTOLIC) 10 MG Tab Take 10 mg by mouth once daily.    olmesartan (BENICAR) 40 MG tablet Take 40 mg by mouth.    polyethylene glycol (GLYCOLAX) 17 gram PwPk Take 17 g by mouth once daily.    zolpidem (AMBIEN) 10 mg Tab TAKE 1 TABLET BY MOUTH EVERY DAY AT BEDTIME AS NEEDED    HYDROcodone-acetaminophen (NORCO)  mg per tablet Take 1 tablet by mouth every 6 (six) hours as needed for Pain.     Family History       Problem Relation (Age of Onset)    Diabetes Mother (83)    No Known Problems Father, Sister, Brother, Brother, Brother, Brother          Tobacco Use    Smoking status: Former     Current packs/day: 1.00     Average packs/day: 1 pack/day for 20.0 years (20.0 ttl pk-yrs)     Types: Cigars, Cigarettes    Smokeless tobacco: Never    Tobacco comments:     Also reports 5-6 yrs of smoking cigars   Substance and Sexual Activity    Alcohol use: No    Drug use: No    Sexual activity: Yes     Partners: Female     Review of Systems   Constitutional: Positive for malaise/fatigue.   Cardiovascular:  Positive for chest pain and dyspnea on exertion. Negative for irregular heartbeat and leg swelling.   Respiratory:  Positive for shortness of breath.    Gastrointestinal:  Negative for nausea and vomiting.   Neurological:  Negative for light-headedness and weakness.   Psychiatric/Behavioral:  Negative for altered mental status. The patient is nervous/anxious.    All other systems reviewed and are  negative.    Objective:     Vital Signs (Most Recent):  Temp: (P) 97.6 °F (36.4 °C) (02/09/24 1345)  Pulse: 64 (02/09/24 1509)  Resp: (P) 18 (02/09/24 1345)  BP: (!) 198/82 (02/09/24 1315)  SpO2: 100 % (02/09/24 1019) Vital Signs (24h Range):  Temp:  [97.3 °F (36.3 °C)-98.7 °F (37.1 °C)] (P) 97.6 °F (36.4 °C)  Pulse:  [38-78] 64  Resp:  [18-32] (P) 18  SpO2:  [92 %-100 %] 100 %  BP: (120-210)/() 198/82     Weight: 76.5 kg (168 lb 9.6 oz)  Body mass index is 22.87 kg/m².    SpO2: 100 %         Intake/Output Summary (Last 24 hours) at 2/9/2024 1538  Last data filed at 2/9/2024 1345  Gross per 24 hour   Intake 1867.92 ml   Output 3400 ml   Net -1532.08 ml       Lines/Drains/Airways       Peripheral Intravenous Line  Duration                  Hemodialysis AV Fistula Left forearm -- days         Peripheral IV - Single Lumen 02/09/24 0332 20 G Anterior;Right Forearm <1 day                    Physical Exam  Vitals and nursing note reviewed.   Constitutional:       Appearance: He is ill-appearing.      Comments: Receiving dialysis   HENT:      Head: Normocephalic and atraumatic.      Mouth/Throat:      Mouth: Mucous membranes are moist.      Pharynx: Oropharynx is clear.   Eyes:      Extraocular Movements: Extraocular movements intact.   Cardiovascular:      Rate and Rhythm: Normal rate and regular rhythm.      Pulses: Normal pulses.      Heart sounds: Normal heart sounds. No murmur heard.     No friction rub. No gallop.   Pulmonary:      Breath sounds: Rhonchi present.      Comments: tachypneic  Musculoskeletal:      Cervical back: Normal range of motion and neck supple.      Right lower leg: No edema.      Left lower leg: No edema.   Skin:     General: Skin is warm.      Capillary Refill: Capillary refill takes less than 2 seconds.   Neurological:      Mental Status: He is alert and oriented to person, place, and time.   Psychiatric:      Comments: Tearful, anxious regarding his active chest pain and pleural  effusion         Significant Labs: BMP:   Recent Labs   Lab 02/08/24  1709 02/09/24  1040    93   * 134*   K 3.4* 3.7   CL 99 98   CO2 32* 31*   BUN 17 19   CREATININE 5.5* 6.3*   CALCIUM 7.9* 7.9*   MG  --  1.9   , CMP   Recent Labs   Lab 02/08/24  1709 02/09/24  1040   * 134*   K 3.4* 3.7   CL 99 98   CO2 32* 31*    93   BUN 17 19   CREATININE 5.5* 6.3*   CALCIUM 7.9* 7.9*   PROT 7.8 8.2   ALBUMIN 2.4* 2.4*   BILITOT 0.5 0.7   ALKPHOS 62 69   AST 34 31   ALT 20 19   ANIONGAP 4 5   , CBC   Recent Labs   Lab 02/08/24  1709 02/09/24  1040   WBC 4.21 4.31   HGB 6.9* 9.0*   HCT 22.0* 28.5*   * 145*   , and All pertinent lab results from the last 24 hours have been reviewed.    Significant Imaging:     Assessment and Plan:    CAD s/p PCI 11/2023   Chest pain  -pt with recent PCI  -active chest pain with shortness of breath - HS troponin 433>459>458 - likely related to demand ischemia in the setting of hypertension and end stage renal disease  -STAT EKG with no acute ST changes - T wave abnormalities present and consistent with prior EKG   -continue to monitor closely  -currently with nitro paste   -resume DAPT - asa and plavix   -resume nebivolol 20 mg po qd   -resume atorvastatin 40 mg po qhs     Pleural effusion   Shortness of breath   -pt with subacute pleural effusion - which is worsening   -general surgery consulted   -continue supplemental oxygen   -Echo report pending to assess LVEF     HTN  -currently hypertensive   -currently receiving dialysis   -resume home meds when dialysis complete   -olmesartan 40 mg po qd, nifedipine 90 mg po qd, nebivolol 20 mg po qd   -if BP remains elevated after initiation of home meds, add hydralazine 50 mg po bid     ESRD - dialysis   Anemia of chronic disease   -currently receiving dialysis - states he has not missed any dialysis sessions   -s/p 2 units pRBCs   -resume ASA and Plavix for DAPT - no evidence of active bleeding   -nephrology following      T2DM   -currently stable   -continue management per primary        Active Diagnoses:    Diagnosis Date Noted POA    PRINCIPAL PROBLEM:  Anemia in ESRD (end-stage renal disease) [N18.6, D63.1] 03/02/2020 Yes    Shortness of breath [R06.02] 11/18/2023 Yes    Pleural effusion [J90] 11/18/2023 Yes    Dialysis patient [Z99.2]  Not Applicable    Anemia of chronic disease [D63.8] 08/18/2022 Yes    COPD (chronic obstructive pulmonary disease) [J44.9]  Yes    End stage kidney disease [N18.6] 11/06/2021 Yes    Diabetes mellitus type 2, controlled [E11.9] 10/16/2014 Yes     Chronic      Problems Resolved During this Admission:       VTE Risk Mitigation (From admission, onward)           Ordered     IP VTE HIGH RISK PATIENT  Once         02/08/24 2040     Place sequential compression device  Until discontinued         02/08/24 2040     Place GLENN hose  Until discontinued         02/08/24 2040                    Thank you for your consult.     Shantal Cervantes PA-C   Cardiology     Franklin Plata MD  Cardiology   [facility]

## 2024-02-09 NOTE — RESPIRATORY THERAPY
02/09/24 1705   Patient Assessment/Suction   Level of Consciousness (AVPU) alert   Respiratory Effort Unlabored   Expansion/Accessory Muscles/Retractions no use of accessory muscles;no retractions   All Lung Fields Breath Sounds clear;diminished   Rhythm/Pattern, Respiratory unlabored;pattern regular   Cough Frequency no cough   PRE-TX-O2   Device (Oxygen Therapy) nasal cannula   $ Is the patient on Low Flow Oxygen? Yes   Flow (L/min) 1   Oxygen Concentration (%) 24   SpO2 100 %   Pulse Oximetry Type Intermittent   $ Pulse Oximetry - Multiple Charge Pulse Oximetry - Multiple   Pulse 75   Resp 18   Positioning HOB elevated 30 degrees   Positioning   Body Position supine   Respiratory Interventions   Cough And Deep Breathing done independently per patient   Ready to Wean/Extubation Screen   FIO2<=50 (chart decimal) 0.24   Education   $ Education 15 min   Respiratory Evaluation   $ Care Plan Tech Time 15 min     Patient weaned to 1lpm nasal cannula.  Luisa RN notified.

## 2024-02-09 NOTE — ASSESSMENT & PLAN NOTE
Continue supplemental O2 by nasal cannula.  Continue current treatment/testing to determine underlying cause.

## 2024-02-09 NOTE — NURSING
"Called into patient's room by ultrasound tech completing ECHO. Patient stating he does not feel good, very restless, and short of breath. Set of vitals completed and on monitor patient's BP was 233/114. Manual BP was 210/100s. SpO2: 100%. ALBERT Robledo was contacted. Patient agitated and restless and wanted to sit up on side of bed. Patient was sat up on side of bed by staff.     @1025: Patient started to complain of 8/10 chest pain. Patient's heart rate on portable pulse ox was 48 then dropped to 38. On telemetry, patient's heart rate was 68-72.     @1029: EKG completed and order placed.   ALBERT Robledo was notified of EKG results. EKG results: "normal sinus rhythm, T wave abnormality, consider lateral ischemia."   ALBERT Robledo stated to place order for repeat troponin STAT, hydralazine 10mg IV one time dose, and 1 inch of nitro paste for a one time dose.       @1044: Repeat BP was 140/86. ALBERT Robledo notified and stated to hold hydralazine for now. Chest pain 7/10. Nitro paste applied to left chest.  ROSETTA Santo dialysis nurse here to begin dialysis.    "

## 2024-02-09 NOTE — NURSING
Received call from Momo in lab regarding blood not being available and is being transported STAT from elsewhere. Updated pt.

## 2024-02-09 NOTE — H&P
"  [facility] Medicine  History & Physical    Patient Name: Kevin Sanon  MRN: [id number]  Patient Class: OP- Observation  Admission Date: 2/8/2024  Attending Physician: Chencho Frank III, MD   Primary Care Provider: Chencho Frank III, MD         Patient information was obtained from patient, past medical records, and ER records.     Subjective:     Principal Problem:Anemia in ESRD (end-stage renal disease)    Chief Complaint:   Chief Complaint   Patient presents with    Chest Pain     Pt stated that he has been experiencing intermittent chest pain for the past couple weeks. Stents placed approx month ago. Dialysis patient.         HPI: ED HPI:    Chief Complaint   Patient presents with    Chest Pain       Pt stated that he has been experiencing intermittent chest pain for the past couple weeks. Stents placed approx month ago. Dialysis patient.       Pt presents to the ED for for SOB.  He states that this has been ongoing for them past 3 weeks.  He is on dialysis MWF.  Pt has not had a change in his sxs.  He tells me that he has "fluid" around his lung that has been there for several months.  He does not need to use O2 at home.  Pt has not had a fever.  He denies any cough.  Pt reports nausea w/o vomiting at this time.    IM HPI:  Agree with above.  Patient with increased shortness of breath this morning.  He has received 1 of 2 units PRBC.  We will receive 2nd unit in dialysis today.  Nephrology consulted for continued hemodialysis during admission.  Appreciate recs.  Preliminary chest X ray review findings revealed possible large left pleural effusion.  Waiting for official read.  We will review patient's chart determine if new or worsening.  Patient with worsening shortness a breath.  Speaking in broken sentences.  We obtain CT chest and echocardiogram today.  Cardiology consulted.  Appreciate recs.  General surgery consulted for pleural effusion management.  Continue supplemental O2 via " nasal cannula.  Creatinine slightly elevated above baseline.  Plan for dialysis today.  Patient noted to elevated blood pressure.  We will resume home medications post dialysis.  We will repeat CBC post transfusion.    Past Medical History:   Diagnosis Date    Chronic kidney disease (CKD) 02/10/2020    CKD (chronic kidney disease) stage 3, GFR 30-59 ml/min 10/16/2014    CKD (chronic kidney disease), stage V 01/20/2020    COPD (chronic obstructive pulmonary disease)     Coronary artery disease     COVID-19 01/03/2022    Diabetes mellitus     Dialysis patient     05/03/2023 LAST    Dysphagia 10/2021    Encounter for blood transfusion     Hypertension     Insomnia     PAD (peripheral artery disease)     Weight loss 10/2021       Past Surgical History:   Procedure Laterality Date    BACK SURGERY      KNEE SURGERY Left     TKR    PLACEMENT OF ARTERIOVENOUS GRAFT Left 02/10/2020    Procedure: INSERTION, GRAFT, ARTERIOVENOUS;  Surgeon: Artie Joshi MD;  Location: [facility];  Service: Cardiovascular;  Laterality: Left;    SPINAL FUSION N/A 11/07/2021    Procedure: FUSION, SPINE;  Surgeon: Vadim Lim MD;  Location: [address];  Service: Orthopedics;  Laterality: N/A;  ORIF T12 Fracture with T10-L2 Fusion, T11 and T12 laminectomy  [employer]  SNS: [employer]  [place] + pads  Craniotome and M8       Review of patient's allergies indicates:  No Known Allergies    Current Facility-Administered Medications on File Prior to Encounter   Medication    0.9%  NaCl infusion    lidocaine (PF) 10 mg/ml (1%) injection 10 mg    ondansetron injection 4 mg     Current Outpatient Medications on File Prior to Encounter   Medication Sig    amLODIPine (NORVASC) 10 MG tablet Take 1 tablet (10 mg total) by mouth once daily.    hydrALAZINE (APRESOLINE) 50 MG tablet Take 1 tablet (50 mg total) by mouth every 12 (twelve) hours.    HYDROcodone-acetaminophen (NORCO) 7.5-325 mg per tablet Take 1 tablet by mouth every 6 (six) hours as  needed for Pain.    lactulose (CHRONULAC) 20 gram/30 mL Soln Take 45 mLs (30 g total) by mouth 3 (three) times daily as needed (Constipation).    LIDOcaine (LIDODERM) 5 % Place 1 patch onto the skin once daily. Remove & Discard patch within 12 hours or as directed by MD    nebivoloL (BYSTOLIC) 10 MG Tab Take 10 mg by mouth once daily.    olmesartan (BENICAR) 40 MG tablet Take 40 mg by mouth.    polyethylene glycol (GLYCOLAX) 17 gram PwPk Take 17 g by mouth once daily.    zolpidem (AMBIEN) 10 mg Tab TAKE 1 TABLET BY MOUTH EVERY DAY AT BEDTIME AS NEEDED    HYDROcodone-acetaminophen (NORCO)  mg per tablet Take 1 tablet by mouth every 6 (six) hours as needed for Pain.     Family History       Problem Relation (Age of Onset)    Diabetes Mother (83)    No Known Problems Father, Sister, Brother, Brother, Brother, Brother          Tobacco Use    Smoking status: Former     Current packs/day: 1.00     Average packs/day: 1 pack/day for 20.0 years (20.0 ttl pk-yrs)     Types: Cigars, Cigarettes    Smokeless tobacco: Never    Tobacco comments:     Also reports 5-6 yrs of smoking cigars   Substance and Sexual Activity    Alcohol use: No    Drug use: No    Sexual activity: Yes     Partners: Female     Review of Systems   Constitutional:  Positive for activity change and fatigue. Negative for appetite change, chills, diaphoresis and fever.   HENT:  Negative for congestion, ear pain, facial swelling, postnasal drip, rhinorrhea, sinus pain, sore throat and trouble swallowing.    Eyes:  Negative for photophobia, pain, redness and visual disturbance.   Respiratory:  Positive for shortness of breath. Negative for cough, chest tightness and wheezing.    Cardiovascular:  Negative for chest pain, palpitations and leg swelling.   Gastrointestinal:  Positive for nausea. Negative for abdominal distention, abdominal pain, blood in stool, constipation, diarrhea and vomiting.   Endocrine: Negative for polydipsia, polyphagia and polyuria.    Genitourinary:  Negative for difficulty urinating, dysuria, flank pain, frequency, hematuria, penile pain, testicular pain and urgency.   Musculoskeletal:  Negative for arthralgias, myalgias and neck stiffness.   Skin:  Negative for pallor, rash and wound.   Allergic/Immunologic: Negative.    Neurological:  Negative for dizziness, tremors, seizures, syncope, weakness, light-headedness and headaches.   Hematological: Negative.    Psychiatric/Behavioral:  Negative for agitation, confusion and sleep disturbance. The patient is not nervous/anxious.      Objective:     Vital Signs (Most Recent):  Temp: 97.6 °F (36.4 °C) (02/09/24 0719)  Pulse: 63 (02/09/24 0835)  Resp: 20 (02/09/24 0825)  BP: (!) 177/82 (02/09/24 0719)  SpO2: 100 % (02/09/24 0825) Vital Signs (24h Range):  Temp:  [97.5 °F (36.4 °C)-98.7 °F (37.1 °C)] 97.6 °F (36.4 °C)  Pulse:  [60-70] 63  Resp:  [19-32] 20  SpO2:  [92 %-100 %] 100 %  BP: (120-188)/(60-90) 177/82     Weight: 76.5 kg (168 lb 9.6 oz)  Body mass index is 22.87 kg/m².     Physical Exam  Vitals and nursing note reviewed.   Constitutional:       General: He is not in acute distress.     Appearance: Normal appearance. He is ill-appearing.   HENT:      Head: Normocephalic and atraumatic.      Nose: Nose normal. No congestion or rhinorrhea.      Mouth/Throat:      Mouth: Mucous membranes are moist.      Pharynx: Oropharynx is clear. No oropharyngeal exudate or posterior oropharyngeal erythema.   Eyes:      General: No scleral icterus.     Extraocular Movements: Extraocular movements intact.      Conjunctiva/sclera: Conjunctivae normal.      Pupils: Pupils are equal, round, and reactive to light.   Neck:      Vascular: JVD present. No carotid bruit.   Cardiovascular:      Rate and Rhythm: Normal rate and regular rhythm.      Pulses: Normal pulses.      Heart sounds: Normal heart sounds. No murmur heard.  Pulmonary:      Effort: Pulmonary effort is normal. Tachypnea present. No respiratory  distress.      Breath sounds: Examination of the left-upper field reveals decreased breath sounds. Examination of the left-middle field reveals decreased breath sounds. Examination of the left-lower field reveals decreased breath sounds. Decreased breath sounds present. No wheezing, rhonchi or rales.      Comments: Pursed lip breathing.  Speaking broken sentences.   Abdominal:      General: Bowel sounds are normal. There is no distension.      Palpations: Abdomen is soft.      Tenderness: There is no abdominal tenderness. There is no guarding or rebound.   Musculoskeletal:         General: Normal range of motion.      Cervical back: Normal range of motion and neck supple.      Right lower leg: No edema.      Left lower leg: No edema.   Lymphadenopathy:      Cervical: No cervical adenopathy.   Skin:     General: Skin is warm and dry.      Capillary Refill: Capillary refill takes less than 2 seconds.   Neurological:      General: No focal deficit present.      Mental Status: He is alert and oriented to person, place, and time.      Cranial Nerves: No cranial nerve deficit.      Motor: No weakness.   Psychiatric:         Mood and Affect: Mood normal.         Behavior: Behavior normal.         Thought Content: Thought content normal.         Judgment: Judgment normal.              CRANIAL NERVES     CN III, IV, VI   Pupils are equal, round, and reactive to light.       Significant Labs: All pertinent labs within the past 24 hours have been reviewed.  Recent Lab Results         02/08/24  1802   02/08/24  1801   02/08/24  1709        Unit Blood Type Code 9500  [P]            9500  [P]           Unit Expiration [id number]  [P]            [id number]  [P]           Unit Blood Type O NEG  [P]            O NEG  [P]           Albumin     2.4       ALP     62       ALT     20       Anion Gap     4       AST     34       Baso #     0.01       Basophil %     0.2       BILIRUBIN TOTAL     0.5  Comment: For infants and newborns,  interpretation of results should be based  on gestational age, weight and in agreement with clinical  observations.    Premature Infant recommended reference ranges:  Up to 24 hours.............<8.0 mg/dL  Up to 48 hours............<12.0 mg/dL  3-5 days..................<15.0 mg/dL  6-29 days.................<15.0 mg/dL    For patients on Eltrombopag therapy, use of Dimension [place] TBIL is   not   recommended.         BUN     17       Calcium     7.9       Chloride     99       CO2     32       CODING SYSTEM [id number]  [P]            [id number]  [P]           Creatinine     5.5       Crossmatch Interpretation Compatible  [P]            Compatible  [P]           Differential Method     Automated       DISPENSE STATUS ISSUED  [P]            CROSSMATCHED  [P]           eGFR     10.7       Eos #     0.1       Eos %     1.2       Glucose     104       Gran # (ANC)     3.0       Gran %     70.8       Group & Rh O NEG           Hematocrit     22.0       Hemoglobin     6.9       Immature Grans (Abs)     0.03  Comment: Mild elevation in immature granulocytes is non specific and   can be seen in a variety of conditions including stress response,   acute inflammation, trauma and pregnancy. Correlation with other   laboratory and clinical findings is essential.         Immature Granulocytes     0.7       INDIRECT KIMBERLYN NEG           Lymph #     0.6       Lymph %     14.7       MCH     30.5       MCHC     31.4       MCV     97       Mono #     0.5       Mono %     12.4       MPV     10.6       nRBC     0       NT-proBNP     62716       Platelet Count     146       Potassium     3.4       Product Code E0793L60  [P]            [id number]  [P]           PROTEIN TOTAL     7.8       RBC     2.26       RDW     15.8       Sodium     135       Specimen Outdate 02/11/2024 23:59           Troponin I High Sensitivity   433.8         UNIT NUMBER [id number]  [P]            [id number]  [P]           WBC     4.21                [P] -  Preliminary Result               Significant Imaging: I have reviewed all pertinent imaging results/findings within the past 24 hours.  Assessment/Plan:     * Anemia in ESRD (end-stage renal disease)  Patient's anemia is currently uncontrolled. Has received 1 of 2 units of PRBCs on 02/09/24 . Etiology likely d/t chronic disease due to Chronic Kidney Disease/ESRD  Current CBC reviewed-   Lab Results   Component Value Date    HGB 6.9 (L) 02/08/2024    HCT 22.0 (L) 02/08/2024     Monitor serial CBC and transfuse if patient becomes hemodynamically unstable, symptomatic or H/H drops below 7/21.    Pleural effusion  Patient found to have large pleural effusion on imaging. I have personally reviewed and interpreted the following imaging: Xray. A thoracentesis was deferred. Most likely etiology includes  end-stage renal disease . Management to include Dialysis and general surgery consulted, chest CT ordered.      Shortness of breath  Continue supplemental O2 by nasal cannula.  Continue current treatment/testing to determine underlying cause.      Dialysis patient  Nephrology consulted.  Continue dialysis per Nephrology recs.  Patient last dialyzed 02/07/2024.      Anemia of chronic disease  Patient's anemia is currently uncontrolled. Has received 1 of 2 units of PRBCs on 02/09/2024 . Etiology likely d/t chronic disease due to Chronic Kidney Disease/ESRD  Current CBC reviewed-   Lab Results   Component Value Date    HGB 6.9 (L) 02/08/2024    HCT 22.0 (L) 02/08/2024     Monitor serial CBC and transfuse if patient becomes hemodynamically unstable, symptomatic or H/H drops below 7/21.    COPD (chronic obstructive pulmonary disease)  Continue supplemental O2.  Unclear if current shortness breath secondary to COPD exacerbation or pleural effusion.  Continue close monitoring.  Chest CT ordered.    End stage kidney disease  Creatine stable for now. BMP reviewed- noted Estimated Creatinine Clearance: 14.3 mL/min (A) (based on SCr of  "5.5 mg/dL (H)). according to latest data. Based on current GFR, CKD stage is end stage.  Monitor UOP and serial BMP and adjust therapy as needed. Renally dose meds. Avoid nephrotoxic medications and procedures.    Diabetes mellitus type 2, controlled  Patient's FSGs are controlled on current medication regimen.  Last A1c reviewed-   Lab Results   Component Value Date    HGBA1C 5.0 11/18/2023     Most recent fingerstick glucose reviewed- No results for input(s): "POCTGLUCOSE" in the last 24 hours.  Current correctional scale  Low  Maintain anti-hyperglycemic dose as follows-   Antihyperglycemics (From admission, onward)      Start     Stop Route Frequency Ordered    02/09/24 1037  insulin aspart U-100 pen 0-5 Units         -- SubQ Before meals & nightly PRN 02/09/24 0938          Hold Oral hypoglycemics while patient is in the hospital.      VTE Risk Mitigation (From admission, onward)           Ordered     IP VTE HIGH RISK PATIENT  Once         02/08/24 2040     Place sequential compression device  Until discontinued         02/08/24 2040     Place GLENN hose  Until discontinued         02/08/24 2040                         On 02/09/2024, patient should be placed in hospital observation services under my care in collaboration with Dr. Pena.           Meena Maxwell NP  Department of Hospital Medicine  [facility] Surg          "

## 2024-02-09 NOTE — PLAN OF CARE
[facility] Surg  Initial Discharge Assessment       Primary Care Provider: Chencho Frank III, MD    Admission Diagnosis: SOB (shortness of breath) [R06.02]  Pleural effusion on left [J90]  Anemia, unspecified type [D64.9]    Admission Date: 2/8/2024  Expected Discharge Date:          Payor: MEDICARE / Plan: MEDICARE PART A & B / Product Type: Government /     Extended Emergency Contact Information  Primary Emergency Contact: Catherine Worley  Address: [address] [place], [place] [address] [place] [address]  Home Phone: [phone]  Mobile Phone: [phone]  Relation: Significant other  Secondary Emergency Contact: Lexie Solorio  Mobile Phone: [phone]  Relation: Daughter    Discharge Plan A: Home with family, Home Health  Discharge Plan B: Other (TBD)      [facility] ki work [facility] [address] [place] [address]  Phone: [phone] Fax: [phone]    [place] [id number] [place] [address]  Phone: [phone] Fax: [phone]      Initial Assessment (most recent)       Adult Discharge Assessment - 02/09/24 1547          Discharge Assessment    Assessment Type Discharge Planning Assessment     Confirmed/corrected address, phone number and insurance Yes     Confirmed Demographics Correct on Facesheet     Source of Information patient;family     Reason For Admission Anemia in ESRD (end-stage renal disease)     People in Home significant other     Do you expect to return to your current living situation? Yes     Do you have help at home or someone to help you manage your care at home? Yes     Who are your caregiver(s) and their phone number(s)? Catherine (Significant other) [phone] (Mobile)     Prior to hospitilization cognitive status: Alert/Oriented     Current cognitive status: Alert/Oriented     Walking or Climbing Stairs Difficulty yes     Walking or Climbing Stairs ambulation  difficulty, requires equipment;ambulation difficulty, assistance 1 person;transferring difficulty, assistance 1 person     Mobility Management quad cane     Dressing/Bathing Difficulty yes     Dressing/Bathing bathing difficulty, requires equipment;bathing difficulty, assistance 1 person;dressing difficulty, assistance 1 person     Dressing/Bathing Management showerchair     Do you have any problems with: Errands/Grocery;Needs other help     Specify other help The patient's significant other assist him with his care.     Home Accessibility stairs to enter home;stairs within home     Number of Stairs, Within Home, Primary none     Number of Stairs, Main Entrance three     Stair Railings, Main Entrance railing on right side (ascending)     Equipment Currently Used at Home cane, quad;blood pressure machine     Readmission within 30 days? No     Do you currently have service(s) that help you manage your care at home? No     Do you take prescription medications? No     Do you have prescription coverage? No     Do you have any problems affording any of your prescribed medications? TBD     Who is going to help you get home at discharge? TBD     How do you get to doctors appointments? family or friend will provide;health plan transportation     Are you on dialysis? Yes     Dialysis Name and Scheduled days [facility]     Discharge Plan A Home with family;Home Health     Discharge Plan B Other   TBD    DME Needed Upon Discharge  other (see comments)   TBD    Discharge Plan discussed with: Patient;Adult children                 Initial discharge assessment is completed. Spoke to the patient in his room. The patient's family was present at his bedside. He gave me permission to complete the assessment while they were present. The patient lives with his significant to other. The patient does requires assistance with his care. He uses a quad cane to ambulate with. He also has other DME for his care. The patient  receives dialysis on MWF at [facility]. I spoke to the patient about possible home health care, and he said only if it is needed. Contact information was left in the patient's room. SW/TRISTEN will remain available.

## 2024-02-09 NOTE — RESPIRATORY THERAPY
02/09/24 0825   Patient Assessment/Suction   Level of Consciousness (AVPU) alert   Respiratory Effort Unlabored   Expansion/Accessory Muscles/Retractions no use of accessory muscles;no retractions   All Lung Fields Breath Sounds clear;diminished   Rhythm/Pattern, Respiratory unlabored   Cough Frequency no cough   PRE-TX-O2   Device (Oxygen Therapy) nasal cannula   $ Is the patient on Low Flow Oxygen? Yes   Flow (L/min) 2   Oxygen Concentration (%) 28   SpO2 100 %   Pulse Oximetry Type Intermittent   $ Pulse Oximetry - Multiple Charge Pulse Oximetry - Multiple   Pulse 68   Resp 20   Positioning HOB elevated 30 degrees   Respiratory Interventions   Cough And Deep Breathing done independently per patient   Ready to Wean/Extubation Screen   FIO2<=50 (chart decimal) 0.28   Education   $ Education 15 min   Respiratory Evaluation   $ Care Plan Tech Time 15 min     Patient weaned to 2lpm at this time.  No distress noted.  Fernando SARGENT notified.

## 2024-02-09 NOTE — ASSESSMENT & PLAN NOTE
Patient's anemia is currently uncontrolled. Has received 1 of 2 units of PRBCs on 02/09/24 . Etiology likely d/t chronic disease due to Chronic Kidney Disease/ESRD  Current CBC reviewed-   Lab Results   Component Value Date    HGB 6.9 (L) 02/08/2024    HCT 22.0 (L) 02/08/2024     Monitor serial CBC and transfuse if patient becomes hemodynamically unstable, symptomatic or H/H drops below 7/21.

## 2024-02-09 NOTE — ASSESSMENT & PLAN NOTE
Patient found to have large pleural effusion on imaging. I have personally reviewed and interpreted the following imaging: Xray. A thoracentesis was deferred. Most likely etiology includes  end-stage renal disease . Management to include Dialysis and general surgery consulted, chest CT ordered.

## 2024-02-09 NOTE — ASSESSMENT & PLAN NOTE
"Patient's FSGs are controlled on current medication regimen.  Last A1c reviewed-   Lab Results   Component Value Date    HGBA1C 5.0 11/18/2023     Most recent fingerstick glucose reviewed- No results for input(s): "POCTGLUCOSE" in the last 24 hours.  Current correctional scale  Low  Maintain anti-hyperglycemic dose as follows-   Antihyperglycemics (From admission, onward)      Start     Stop Route Frequency Ordered    02/09/24 1037  insulin aspart U-100 pen 0-5 Units         -- SubQ Before meals & nightly PRN 02/09/24 0938          Hold Oral hypoglycemics while patient is in the hospital.  "

## 2024-02-09 NOTE — NURSING
Called Meena Tripathi NP regarding pt's c/o pain as pt had back surgery/fusion. Received order to restart home Norco 7.5-325 mg tab Q6HP.

## 2024-02-09 NOTE — ASSESSMENT & PLAN NOTE
Creatine stable for now. BMP reviewed- noted Estimated Creatinine Clearance: 14.3 mL/min (A) (based on SCr of 5.5 mg/dL (H)). according to latest data. Based on current GFR, CKD stage is end stage.  Monitor UOP and serial BMP and adjust therapy as needed. Renally dose meds. Avoid nephrotoxic medications and procedures.

## 2024-02-09 NOTE — ASSESSMENT & PLAN NOTE
Nephrology consulted.  Continue dialysis per Nephrology recs.  Patient last dialyzed 02/07/2024.

## 2024-02-09 NOTE — ASSESSMENT & PLAN NOTE
Continue supplemental O2.  Unclear if current shortness breath secondary to COPD exacerbation or pleural effusion.  Continue close monitoring.  Chest CT ordered.

## 2024-02-10 PROBLEM — D63.8 ANEMIA OF CHRONIC DISEASE: Status: RESOLVED | Noted: 2022-08-18 | Resolved: 2024-02-10

## 2024-02-10 PROBLEM — R06.02 SHORTNESS OF BREATH: Status: RESOLVED | Noted: 2023-11-18 | Resolved: 2024-02-10

## 2024-02-10 NOTE — PLAN OF CARE
Plan of care reviewed and ongoing with patients and spouse at the bedside. 20 gauge IV saline locked, L AV fistula present, 2 L NC tolerating well. BP elevated during the night, PRN medication provided per MAR orders. Free of pain during the night. Call light and personal items within reach of patient.       Problem: Adult Inpatient Plan of Care  Goal: Plan of Care Review  Outcome: Ongoing, Progressing  Goal: Absence of Hospital-Acquired Illness or Injury  Outcome: Ongoing, Progressing  Goal: Optimal Comfort and Wellbeing  Outcome: Ongoing, Progressing     Problem: Diabetes Comorbidity  Goal: Blood Glucose Level Within Targeted Range  Outcome: Ongoing, Progressing     Problem: Skin Injury Risk Increased  Goal: Skin Health and Integrity  Outcome: Ongoing, Progressing     Problem: Device-Related Complication Risk (Hemodialysis)  Goal: Safe, Effective Therapy Delivery  Outcome: Ongoing, Progressing     Problem: Hemodynamic Instability (Hemodialysis)  Goal: Effective Tissue Perfusion  Outcome: Ongoing, Progressing     Problem: Infection (Hemodialysis)  Goal: Absence of Infection Signs and Symptoms  Outcome: Ongoing, Progressing     Problem: Adult Inpatient Plan of Care  Goal: Patient-Specific Goal (Individualized)  Outcome: Ongoing, Not Progressing  Goal: Readiness for Transition of Care  Outcome: Ongoing, Not Progressing

## 2024-02-10 NOTE — ASSESSMENT & PLAN NOTE
Creatine stable for now. BMP reviewed- noted Estimated Creatinine Clearance: 14.8 mL/min (A) (based on SCr of 5.3 mg/dL (H)). according to latest data. Based on current GFR, CKD stage is end stage.  Monitor UOP and serial BMP and adjust therapy as needed. Renally dose meds. Avoid nephrotoxic medications and procedures.    2/10 GT:  Patient is on HD.  3400 mL of fluid removed during dialysis yesterday.  Patient also noted with 100 mL of urine output.  Continue dialysis per routine schedule.

## 2024-02-10 NOTE — ASSESSMENT & PLAN NOTE
Patient's anemia is currently uncontrolled. Has received 1 of 2 units of PRBCs on 02/09/2024 . Etiology likely d/t chronic disease due to Chronic Kidney Disease/ESRD  Current CBC reviewed-   Lab Results   Component Value Date    HGB 10.5 (L) 02/10/2024    HCT 31.9 (L) 02/10/2024     Monitor serial CBC and transfuse if patient becomes hemodynamically unstable, symptomatic or H/H drops below 7/21.

## 2024-02-10 NOTE — ASSESSMENT & PLAN NOTE
Patient's anemia is currently uncontrolled. Has received 1 of 2 units of PRBCs on 02/09/24 . Etiology likely d/t chronic disease due to Chronic Kidney Disease/ESRD  Current CBC reviewed-   Lab Results   Component Value Date    HGB 10.5 (L) 02/10/2024    HCT 31.9 (L) 02/10/2024     Monitor serial CBC and transfuse if patient becomes hemodynamically unstable, symptomatic or H/H drops below 7/21.    2/10 GT:  H&H improved from yesterday s/p blood transfusion.  Results are above.

## 2024-02-10 NOTE — ASSESSMENT & PLAN NOTE
Continue supplemental O2.  Unclear if current shortness breath secondary to COPD exacerbation or pleural effusion.  Continue close monitoring.  Chest CT ordered.    2/10 GT:  CT of chest reviewed.  Dyspnea most likely s/t extensive left pleural effusion, which extends throughout the entirety of the left hemithorax with a completely collapsed left lung.  Patient also has a large dependent right effusion.  Continue with supplemental oxygen.  Anxiolytics as needed.

## 2024-02-10 NOTE — HOSPITAL COURSE
2/10 GT: CT of chest demonstrated:  FINDINGS:  Lack of intravenous contrast limits evaluation of the vasculature and remaining mediastinal structures.  Unenhanced aorta is normal in caliber.  Thyroid is unremarkable.  Dense atherosclerotic calcification throughout the arterial vasculature including the coronary arteries.  No pericardial effusion.  No detected thoracic adenopathy.  Large left pleural effusion is present measuring up to 9 cm in greatest axial thickness occupying nearly the entirety of the left hemithorax.  Associated marked mass effect/consolidation of the left lung which appears collapsed centrally.  The parenchyma of the left lung is not able to be evaluated secondary to complete collapsed state.  Subcentimeter calcified pulmonary granuloma within the aerated right lung.  Large dependent right pleural effusion is present measuring up to 6 cm in greatest axial thickness.  Dense underlying right lower lobe moderate atelectasis versus consolidation.  Healing fractures of the right 7th through 12th ribs posteriorly near the costovertebral junction regions.  Previous vertebroplasty at the previous vertebroplasty at T10 and L2.  Multilevel thoracolumbar spinal fusion hardware in place extending from T10-L2.  Diffuse anasarca is present.   Impression:       1. Large left pleural effusion occupying nearly the entirety of the left hemithorax with complete collapse/consolidation of the left lung centrally.  2. Large right pleural effusion layering dependently with moderate underlying atelectasis versus consolidation.  3. Diffuse anasarca.   This morning patient is noted to be tachypneic, with pursed lipped breathing, and is needing to pause multiple times during conversation due to dyspnea.  Patient reports he turned over in the bed to reach something his bedside table, and states worsening dyspnea afterwards.  We have informed patient he needs to be transferred for pulmonology services and thoracentesis due  to has left lung being completely collapsed s/t a left pleural effusion taken of the entirety of the left hemithorax.  Informed patient he also has a large right pleural effusion with atelectasis versus consolidation.  Patient noted to be significantly hypertensive this morning, but he did take his oral antihypertensives.  3400 mL of fluid was removed via dialysis yesterday, the patient had 100 mL of urine output.  H&H improved and is now 10.5/31.9.  Patient did have elevated troponin that was trended, but significantly decreased from 1 month ago, suspect this is from demand ischemia due to hypertensive state as well as ESRD on HD.    DC Summary:  [facility] by Dr. Ricks for pulmonology services, thoracentesis. Patient is being transferred for the above mentioned issue. Patient's blood pressure noted to be improved since morning med administration. He remains afebrile. He is stable for transfer once a bed is assigned.

## 2024-02-10 NOTE — ASSESSMENT & PLAN NOTE
Nephrology consulted.  Continue dialysis per Nephrology recs.  Patient last dialyzed 02/07/2024.    2/10 GT:  3400 mL of fluid removed during dialysis yesterday, and patient was noted with 100 mL of urine output.     Patient requests all Lab and Radiology Results on their Discharge Instructions

## 2024-02-10 NOTE — SUBJECTIVE & OBJECTIVE
Past Medical History:   Diagnosis Date    Chronic kidney disease (CKD) 02/10/2020    CKD (chronic kidney disease) stage 3, GFR 30-59 ml/min 10/16/2014    CKD (chronic kidney disease), stage V 01/20/2020    COPD (chronic obstructive pulmonary disease)     Coronary artery disease     COVID-19 01/03/2022    Diabetes mellitus     Dialysis patient     05/03/2023 LAST    Dysphagia 10/2021    Encounter for blood transfusion     Hypertension     Insomnia     PAD (peripheral artery disease)     Weight loss 10/2021       Past Surgical History:   Procedure Laterality Date    BACK SURGERY      KNEE SURGERY Left     TKR    PLACEMENT OF ARTERIOVENOUS GRAFT Left 02/10/2020    Procedure: INSERTION, GRAFT, ARTERIOVENOUS;  Surgeon: Artie Joshi MD;  Location: [facility] OR;  Service: Cardiovascular;  Laterality: Left;    SPINAL FUSION N/A 11/07/2021    Procedure: FUSION, SPINE;  Surgeon: Vadim Lim MD;  Location: [facility] OR [facility] FLR;  Service: Orthopedics;  Laterality: N/A;  ORIF T12 Fracture with T10-L2 Fusion, T11 and T12 laminectomy  [facility]  SNS: [employer]  [place] + pads  Craniotome and M8       Review of patient's allergies indicates:  No Known Allergies    Current Facility-Administered Medications on File Prior to Encounter   Medication    0.9%  NaCl infusion    lidocaine (PF) 10 mg/ml (1%) injection 10 mg    ondansetron injection 4 mg     Current Outpatient Medications on File Prior to Encounter   Medication Sig    amLODIPine (NORVASC) 10 MG tablet Take 1 tablet (10 mg total) by mouth once daily.    hydrALAZINE (APRESOLINE) 50 MG tablet Take 1 tablet (50 mg total) by mouth every 12 (twelve) hours.    HYDROcodone-acetaminophen (NORCO) 7.5-325 mg per tablet Take 1 tablet by mouth every 6 (six) hours as needed for Pain.    lactulose (CHRONULAC) 20 gram/30 mL Soln Take 45 mLs (30 g total) by mouth 3 (three) times daily as needed (Constipation).    LIDOcaine (LIDODERM) 5 % Place 1 patch onto the skin once daily. Remove &  Discard patch within 12 hours or as directed by MD    nebivoloL (BYSTOLIC) 10 MG Tab Take 10 mg by mouth once daily.    olmesartan (BENICAR) 40 MG tablet Take 40 mg by mouth.    polyethylene glycol (GLYCOLAX) 17 gram PwPk Take 17 g by mouth once daily.    zolpidem (AMBIEN) 10 mg Tab TAKE 1 TABLET BY MOUTH EVERY DAY AT BEDTIME AS NEEDED    HYDROcodone-acetaminophen (NORCO)  mg per tablet Take 1 tablet by mouth every 6 (six) hours as needed for Pain.     Family History       Problem Relation (Age of Onset)    Diabetes Mother (83)    No Known Problems Father, Sister, Brother, Brother, Brother, Brother          Tobacco Use    Smoking status: Former     Current packs/day: 1.00     Average packs/day: 1 pack/day for 20.0 years (20.0 ttl pk-yrs)     Types: Cigars, Cigarettes    Smokeless tobacco: Never    Tobacco comments:     Also reports 5-6 yrs of smoking cigars   Substance and Sexual Activity    Alcohol use: No    Drug use: No    Sexual activity: Yes     Partners: Female     Review of Systems   Constitutional:  Positive for activity change and fatigue. Negative for appetite change, chills, diaphoresis and fever.   HENT:  Negative for congestion, ear pain, facial swelling, postnasal drip, rhinorrhea, sinus pain, sore throat and trouble swallowing.    Eyes:  Negative for photophobia, pain, redness and visual disturbance.   Respiratory:  Positive for chest tightness and shortness of breath. Negative for cough and wheezing.    Cardiovascular:  Positive for chest pain. Negative for palpitations and leg swelling.   Gastrointestinal:  Positive for nausea. Negative for abdominal distention, abdominal pain, blood in stool, constipation, diarrhea and vomiting.   Endocrine: Negative for polydipsia, polyphagia and polyuria.   Genitourinary:  Negative for difficulty urinating, dysuria, flank pain, frequency, hematuria, penile pain, testicular pain and urgency.   Musculoskeletal:  Negative for arthralgias, myalgias and neck  stiffness.   Skin:  Negative for pallor, rash and wound.   Allergic/Immunologic: Negative.    Neurological:  Positive for weakness. Negative for dizziness, tremors, seizures, syncope, light-headedness and headaches.   Hematological: Negative.    Psychiatric/Behavioral:  Negative for agitation, confusion and sleep disturbance. The patient is not nervous/anxious.      Objective:     Vital Signs (Most Recent):  Temp: 97.3 °F (36.3 °C) (02/10/24 0741)  Pulse: 81 (02/10/24 0814)  Resp: (!) 22 (02/10/24 0809)  BP: (!) 240/110 (02/10/24 0741)  SpO2: 98 % (02/10/24 0741) Vital Signs (24h Range):  Temp:  [97.3 °F (36.3 °C)-98.6 °F (37 °C)] 97.3 °F (36.3 °C)  Pulse:  [38-84] 81  Resp:  [16-24] 22  SpO2:  [97 %-100 %] 98 %  BP: (140-240)/() 240/110     Weight: 76.5 kg (168 lb 9.6 oz)  Body mass index is 22.87 kg/m².     Physical Exam  Vitals and nursing note reviewed.   Constitutional:       General: He is not in acute distress.     Appearance: Normal appearance. He is ill-appearing. He is not toxic-appearing.   HENT:      Head: Normocephalic and atraumatic.        Nose: Nose normal. No congestion or rhinorrhea.      Mouth/Throat:      Mouth: Mucous membranes are moist.      Pharynx: Oropharynx is clear. No oropharyngeal exudate or posterior oropharyngeal erythema.   Eyes:      General: No scleral icterus.     Extraocular Movements: Extraocular movements intact.      Conjunctiva/sclera: Conjunctivae normal.      Pupils: Pupils are equal, round, and reactive to light.   Neck:      Vascular: JVD (Bilateral) present. No carotid bruit.   Cardiovascular:      Rate and Rhythm: Normal rate and regular rhythm.      Pulses: Normal pulses.      Heart sounds: Normal heart sounds. No murmur heard.     Comments: LUE AV fistula  Pulmonary:      Effort: Tachypnea and accessory muscle usage present. No respiratory distress.      Breath sounds: Examination of the left-upper field reveals decreased breath sounds and rales. Examination of  the right-middle field reveals rales. Examination of the left-middle field reveals decreased breath sounds and rales. Examination of the right-lower field reveals decreased breath sounds and rales. Examination of the left-lower field reveals decreased breath sounds and rales. Decreased breath sounds and rales present. No wheezing or rhonchi.      Comments: Pursed lip breathing, requiring frequent rest periods during conversation due to dyspnea, on supplemental oxygen via nasal cannula  Chest:       Abdominal:      General: Bowel sounds are normal. There is no distension.      Palpations: Abdomen is soft.      Tenderness: There is no abdominal tenderness. There is no guarding or rebound.   Musculoskeletal:         General: Normal range of motion.      Cervical back: Normal range of motion and neck supple.      Right lower leg: No edema.      Left lower leg: No edema.   Lymphadenopathy:      Cervical: No cervical adenopathy.   Skin:     General: Skin is warm and dry.      Capillary Refill: Capillary refill takes less than 2 seconds.   Neurological:      General: No focal deficit present.      Mental Status: He is alert and oriented to person, place, and time.      Cranial Nerves: No cranial nerve deficit.      Motor: Weakness (generalized) present.   Psychiatric:         Mood and Affect: Mood is anxious.         Behavior: Behavior normal.         Thought Content: Thought content normal.         Judgment: Judgment normal.              CRANIAL NERVES     CN III, IV, VI   Pupils are equal, round, and reactive to light.       Significant Labs: All pertinent labs within the past 24 hours have been reviewed.  Recent Lab Results  (Last 5 results in the past 24 hours)        02/10/24  0603   02/09/24  1642   02/09/24  1337   02/09/24  1242   02/09/24  1242        Albumin 2.2               ALP 69               ALT 17               Anion Gap 6               AST 34               Baso # 0.01               Basophil % 0.2                BILIRUBIN TOTAL 0.8  Comment: For infants and newborns, interpretation of results should be based  on gestational age, weight and in agreement with clinical  observations.    Premature Infant recommended reference ranges:  Up to 24 hours.............<8.0 mg/dL  Up to 48 hours............<12.0 mg/dL  3-5 days..................<15.0 mg/dL  6-29 days.................<15.0 mg/dL    For patients on Eltrombopag therapy, use of Dimension [place] TBIL is   not   recommended.                 BUN 16               Calcium 8.2               Chloride 96               CO2 31               Creatinine 5.3               Differential Method Automated               eGFR 11.2               Eos # 0.0               Eos % 0.5               Glucose 107               Gran # (ANC) 4.3               Gran % 77.5               Hematocrit 31.9               Hemoglobin 10.5               Immature Grans (Abs) 0.07  Comment: Mild elevation in immature granulocytes is non specific and   can be seen in a variety of conditions including stress response,   acute inflammation, trauma and pregnancy. Correlation with other   laboratory and clinical findings is essential.                 Immature Granulocytes 1.3               Lymph # 0.5               Lymph % 8.5               Magnesium  1.8               MCH 31.0               MCHC 32.9               MCV 94               Mono # 0.7               Mono % 12.0               MPV 11.4               nRBC 0               QRS Duration       88   84       [facility] QTC Calculation       443   447       Platelet Count 152               POCT Glucose   74             Potassium 3.7               PROTEIN TOTAL 7.7               RBC 3.39               RDW 16.4               Sodium 133               Troponin I High Sensitivity     458.0           WBC 5.51                                      Significant Imaging: I have reviewed all pertinent imaging results/findings within the past 24 hours.

## 2024-02-10 NOTE — PROGRESS NOTES
"[facility] Medicine  Progress Note    Patient Name: Kevin Sanon  MRN: [id number]  Patient Class: IP- Inpatient   Admission Date: 2/8/2024  Length of Stay: 1 days  Attending Physician: Chencho Frank III, MD  Primary Care Provider: Chencho Frank III, MD        Subjective:     Principal Problem:Anemia in ESRD (end-stage renal disease)        HPI:  ED HPI:    Chief Complaint   Patient presents with    Chest Pain       Pt stated that he has been experiencing intermittent chest pain for the past couple weeks. Stents placed approx month ago. Dialysis patient.       Pt presents to the ED for for SOB.  He states that this has been ongoing for them past 3 weeks.  He is on dialysis MWF.  Pt has not had a change in his sxs.  He tells me that he has "fluid" around his lung that has been there for several months.  He does not need to use O2 at home.  Pt has not had a fever.  He denies any cough.  Pt reports nausea w/o vomiting at this time.    IM HPI:  Agree with above.  Patient with increased shortness of breath this morning.  He has received 1 of 2 units PRBC.  We will receive 2nd unit in dialysis today.  Nephrology consulted for continued hemodialysis during admission.  Appreciate recs.  Preliminary chest X ray review findings revealed possible large left pleural effusion.  Waiting for official read.  We will review patient's chart determine if new or worsening.  Patient with worsening shortness a breath.  Speaking in broken sentences.  We obtain CT chest and echocardiogram today.  Cardiology consulted.  Appreciate recs.  General surgery consulted for pleural effusion management.  Continue supplemental O2 via nasal cannula.  Creatinine slightly elevated above baseline.  Plan for dialysis today.  Patient noted to elevated blood pressure.  We will resume home medications post dialysis.  We will repeat CBC post transfusion.    Overview/Hospital Course:  2/10 GT: CT of chest " demonstrated:  FINDINGS:  Lack of intravenous contrast limits evaluation of the vasculature and remaining mediastinal structures.  Unenhanced aorta is normal in caliber.  Thyroid is unremarkable.  Dense atherosclerotic calcification throughout the arterial vasculature including the coronary arteries.  No pericardial effusion.  No detected thoracic adenopathy.  Large left pleural effusion is present measuring up to 9 cm in greatest axial thickness occupying nearly the entirety of the left hemithorax.  Associated marked mass effect/consolidation of the left lung which appears collapsed centrally.  The parenchyma of the left lung is not able to be evaluated secondary to complete collapsed state.  Subcentimeter calcified pulmonary granuloma within the aerated right lung.  Large dependent right pleural effusion is present measuring up to 6 cm in greatest axial thickness.  Dense underlying right lower lobe moderate atelectasis versus consolidation.  Healing fractures of the right 7th through 12th ribs posteriorly near the costovertebral junction regions.  Previous vertebroplasty at the previous vertebroplasty at T10 and L2.  Multilevel thoracolumbar spinal fusion hardware in place extending from T10-L2.  Diffuse anasarca is present.   Impression:       1. Large left pleural effusion occupying nearly the entirety of the left hemithorax with complete collapse/consolidation of the left lung centrally.  2. Large right pleural effusion layering dependently with moderate underlying atelectasis versus consolidation.  3. Diffuse anasarca.   This morning patient is noted to be tachypneic, with pursed lipped breathing, and is needing to pause multiple times during conversation due to dyspnea.  Patient reports he turned over in the bed to reach something his bedside table, and states worsening dyspnea afterwards.  We have informed patient he needs to be transferred for pulmonology services and thoracentesis due to has left lung  being completely collapsed s/t a left pleural effusion taken of the entirety of the left hemithorax.  Informed patient he also has a large right pleural effusion with atelectasis versus consolidation.  Patient noted to be significantly hypertensive this morning, but he did take his oral antihypertensives.  3400 mL of fluid was removed via dialysis yesterday, the patient had 100 mL of urine output.  H&H improved and is now 10.5/31.9.  Patient did have elevated troponin that was trended, but significantly decreased from 1 month ago, suspect this is from demand ischemia due to hypertensive state as well as ESRD on HD.    Past Medical History:   Diagnosis Date    Chronic kidney disease (CKD) 02/10/2020    CKD (chronic kidney disease) stage 3, GFR 30-59 ml/min 10/16/2014    CKD (chronic kidney disease), stage V 01/20/2020    COPD (chronic obstructive pulmonary disease)     Coronary artery disease     COVID-19 01/03/2022    Diabetes mellitus     Dialysis patient     05/03/2023 LAST    Dysphagia 10/2021    Encounter for blood transfusion     Hypertension     Insomnia     PAD (peripheral artery disease)     Weight loss 10/2021       Past Surgical History:   Procedure Laterality Date    BACK SURGERY      KNEE SURGERY Left     TKR    PLACEMENT OF ARTERIOVENOUS GRAFT Left 02/10/2020    Procedure: INSERTION, GRAFT, ARTERIOVENOUS;  Surgeon: Artie Joshi MD;  Location: [facility] OR;  Service: Cardiovascular;  Laterality: Left;    SPINAL FUSION N/A 11/07/2021    Procedure: FUSION, SPINE;  Surgeon: Vadim Lim MD;  Location: [facility] OR [address] [place] [address];  Service: Orthopedics;  Laterality: N/A;  ORIF T12 Fracture with T10-L2 Fusion, T11 and T12 laminectomy  Arian  SNS: [employer]/SSE[employer]  [place] + pads  Craniotome and M8       Review of patient's allergies indicates:  No Known Allergies    Current Facility-Administered Medications on File Prior to Encounter   Medication    0.9%  NaCl infusion    lidocaine (PF) 10 mg/ml (1%) injection 10  mg    ondansetron injection 4 mg     Current Outpatient Medications on File Prior to Encounter   Medication Sig    amLODIPine (NORVASC) 10 MG tablet Take 1 tablet (10 mg total) by mouth once daily.    hydrALAZINE (APRESOLINE) 50 MG tablet Take 1 tablet (50 mg total) by mouth every 12 (twelve) hours.    HYDROcodone-acetaminophen (NORCO) 7.5-325 mg per tablet Take 1 tablet by mouth every 6 (six) hours as needed for Pain.    lactulose (CHRONULAC) 20 gram/30 mL Soln Take 45 mLs (30 g total) by mouth 3 (three) times daily as needed (Constipation).    LIDOcaine (LIDODERM) 5 % Place 1 patch onto the skin once daily. Remove & Discard patch within 12 hours or as directed by MD    nebivoloL (BYSTOLIC) 10 MG Tab Take 10 mg by mouth once daily.    olmesartan (BENICAR) 40 MG tablet Take 40 mg by mouth.    polyethylene glycol (GLYCOLAX) 17 gram PwPk Take 17 g by mouth once daily.    zolpidem (AMBIEN) 10 mg Tab TAKE 1 TABLET BY MOUTH EVERY DAY AT BEDTIME AS NEEDED    HYDROcodone-acetaminophen (NORCO)  mg per tablet Take 1 tablet by mouth every 6 (six) hours as needed for Pain.     Family History       Problem Relation (Age of Onset)    Diabetes Mother (83)    No Known Problems Father, Sister, Brother, Brother, Brother, Brother          Tobacco Use    Smoking status: Former     Current packs/day: 1.00     Average packs/day: 1 pack/day for 20.0 years (20.0 ttl pk-yrs)     Types: Cigars, Cigarettes    Smokeless tobacco: Never    Tobacco comments:     Also reports 5-6 yrs of smoking cigars   Substance and Sexual Activity    Alcohol use: No    Drug use: No    Sexual activity: Yes     Partners: Female     Review of Systems   Constitutional:  Positive for activity change and fatigue. Negative for appetite change, chills, diaphoresis and fever.   HENT:  Negative for congestion, ear pain, facial swelling, postnasal drip, rhinorrhea, sinus pain, sore throat and trouble swallowing.    Eyes:  Negative for photophobia, pain, redness and  visual disturbance.   Respiratory:  Positive for chest tightness and shortness of breath. Negative for cough and wheezing.    Cardiovascular:  Positive for chest pain. Negative for palpitations and leg swelling.   Gastrointestinal:  Positive for nausea. Negative for abdominal distention, abdominal pain, blood in stool, constipation, diarrhea and vomiting.   Endocrine: Negative for polydipsia, polyphagia and polyuria.   Genitourinary:  Negative for difficulty urinating, dysuria, flank pain, frequency, hematuria, penile pain, testicular pain and urgency.   Musculoskeletal:  Negative for arthralgias, myalgias and neck stiffness.   Skin:  Negative for pallor, rash and wound.   Allergic/Immunologic: Negative.    Neurological:  Positive for weakness. Negative for dizziness, tremors, seizures, syncope, light-headedness and headaches.   Hematological: Negative.    Psychiatric/Behavioral:  Negative for agitation, confusion and sleep disturbance. The patient is not nervous/anxious.      Objective:     Vital Signs (Most Recent):  Temp: 97.3 °F (36.3 °C) (02/10/24 0741)  Pulse: 81 (02/10/24 0814)  Resp: (!) 22 (02/10/24 0809)  BP: (!) 240/110 (02/10/24 0741)  SpO2: 98 % (02/10/24 0741) Vital Signs (24h Range):  Temp:  [97.3 °F (36.3 °C)-98.6 °F (37 °C)] 97.3 °F (36.3 °C)  Pulse:  [38-84] 81  Resp:  [16-24] 22  SpO2:  [97 %-100 %] 98 %  BP: (140-240)/() 240/110     Weight: 76.5 kg (168 lb 9.6 oz)  Body mass index is 22.87 kg/m².     Physical Exam  Vitals and nursing note reviewed.   Constitutional:       General: He is not in acute distress.     Appearance: Normal appearance. He is ill-appearing. He is not toxic-appearing.   HENT:      Head: Normocephalic and atraumatic.        Nose: Nose normal. No congestion or rhinorrhea.      Mouth/Throat:      Mouth: Mucous membranes are moist.      Pharynx: Oropharynx is clear. No oropharyngeal exudate or posterior oropharyngeal erythema.   Eyes:      General: No scleral icterus.      Extraocular Movements: Extraocular movements intact.      Conjunctiva/sclera: Conjunctivae normal.      Pupils: Pupils are equal, round, and reactive to light.   Neck:      Vascular: JVD (Bilateral) present. No carotid bruit.   Cardiovascular:      Rate and Rhythm: Normal rate and regular rhythm.      Pulses: Normal pulses.      Heart sounds: Normal heart sounds. No murmur heard.     Comments: LUE AV fistula  Pulmonary:      Effort: Tachypnea and accessory muscle usage present. No respiratory distress.      Breath sounds: Examination of the left-upper field reveals decreased breath sounds and rales. Examination of the right-middle field reveals rales. Examination of the left-middle field reveals decreased breath sounds and rales. Examination of the right-lower field reveals decreased breath sounds and rales. Examination of the left-lower field reveals decreased breath sounds and rales. Decreased breath sounds and rales present. No wheezing or rhonchi.      Comments: Pursed lip breathing, requiring frequent rest periods during conversation due to dyspnea, on supplemental oxygen via nasal cannula  Chest:       Abdominal:      General: Bowel sounds are normal. There is no distension.      Palpations: Abdomen is soft.      Tenderness: There is no abdominal tenderness. There is no guarding or rebound.   Musculoskeletal:         General: Normal range of motion.      Cervical back: Normal range of motion and neck supple.      Right lower leg: No edema.      Left lower leg: No edema.   Lymphadenopathy:      Cervical: No cervical adenopathy.   Skin:     General: Skin is warm and dry.      Capillary Refill: Capillary refill takes less than 2 seconds.   Neurological:      General: No focal deficit present.      Mental Status: He is alert and oriented to person, place, and time.      Cranial Nerves: No cranial nerve deficit.      Motor: Weakness (generalized) present.   Psychiatric:         Mood and Affect: Mood is anxious.          Behavior: Behavior normal.         Thought Content: Thought content normal.         Judgment: Judgment normal.              CRANIAL NERVES     CN III, IV, VI   Pupils are equal, round, and reactive to light.       Significant Labs: All pertinent labs within the past 24 hours have been reviewed.  Recent Lab Results  (Last 5 results in the past 24 hours)        02/10/24  0603   02/09/24  1642   02/09/24  1337   02/09/24  1242   02/09/24  1242        Albumin 2.2               ALP 69               ALT 17               Anion Gap 6               AST 34               Baso # 0.01               Basophil % 0.2               BILIRUBIN TOTAL 0.8  Comment: For infants and newborns, interpretation of results should be based  on gestational age, weight and in agreement with clinical  observations.    Premature Infant recommended reference ranges:  Up to 24 hours.............<8.0 mg/dL  Up to 48 hours............<12.0 mg/dL  3-5 days..................<15.0 mg/dL  6-29 days.................<15.0 mg/dL    For patients on Eltrombopag therapy, use of Dimension [place] TBIL is   not   recommended.                 BUN 16               Calcium 8.2               Chloride 96               CO2 31               Creatinine 5.3               Differential Method Automated               eGFR 11.2               Eos # 0.0               Eos % 0.5               Glucose 107               Gran # (ANC) 4.3               Gran % 77.5               Hematocrit 31.9               Hemoglobin 10.5               Immature Grans (Abs) 0.07  Comment: Mild elevation in immature granulocytes is non specific and   can be seen in a variety of conditions including stress response,   acute inflammation, trauma and pregnancy. Correlation with other   laboratory and clinical findings is essential.                 Immature Granulocytes 1.3               Lymph # 0.5               Lymph % 8.5               Magnesium  1.8               MCH 31.0               MCHC 32.9                MCV 94               Mono # 0.7               Mono % 12.0               MPV 11.4               nRBC 0               QRS Duration       88   84       OHS QTC Calculation       443   447       Platelet Count 152               POCT Glucose   74             Potassium 3.7               PROTEIN TOTAL 7.7               RBC 3.39               RDW 16.4               Sodium 133               Troponin I High Sensitivity     458.0           WBC 5.51                                      Significant Imaging: I have reviewed all pertinent imaging results/findings within the past 24 hours.    Assessment/Plan:      * Anemia in ESRD (end-stage renal disease)  Patient's anemia is currently uncontrolled. Has received 1 of 2 units of PRBCs on 02/09/24 . Etiology likely d/t chronic disease due to Chronic Kidney Disease/ESRD  Current CBC reviewed-   Lab Results   Component Value Date    HGB 10.5 (L) 02/10/2024    HCT 31.9 (L) 02/10/2024     Monitor serial CBC and transfuse if patient becomes hemodynamically unstable, symptomatic or H/H drops below 7/21.    2/10 GT:  H&H improved from yesterday s/p blood transfusion.  Results are above.    Pleural effusion  Patient found to have large pleural effusion on imaging. I have personally reviewed and interpreted the following imaging: Xray. A thoracentesis was deferred. Most likely etiology includes  end-stage renal disease . Management to include Dialysis and general surgery consulted, chest CT ordered.    2/10 GT:  CT of chest reviewed.  Dyspnea most likely s/t extensive left pleural effusion, which extends throughout the entirety of the left hemithorax with a completely collapsed left lung.  Patient also has a large dependent right pleural effusion. Continue with supplemental oxygen.  Anxiolytics as needed.  Transfer process began.  Patient needs to be transferred for higher level of care for pulmonology services.  Patient informed of this, verbalized understanding.    Dialysis  patient  Nephrology consulted.  Continue dialysis per Nephrology recs.  Patient last dialyzed 02/07/2024.    2/10 GT:  3400 mL of fluid removed during dialysis yesterday, and patient was noted with 100 mL of urine output.      COPD (chronic obstructive pulmonary disease)  Continue supplemental O2.  Unclear if current shortness breath secondary to COPD exacerbation or pleural effusion.  Continue close monitoring.  Chest CT ordered.    2/10 GT:  CT of chest reviewed.  Dyspnea most likely s/t extensive left pleural effusion, which extends throughout the entirety of the left hemithorax with a completely collapsed left lung.  Patient also has a large dependent right effusion.  Continue with supplemental oxygen.  Anxiolytics as needed.    End stage kidney disease  Creatine stable for now. BMP reviewed- noted Estimated Creatinine Clearance: 14.8 mL/min (A) (based on SCr of 5.3 mg/dL (H)). according to latest data. Based on current GFR, CKD stage is end stage.  Monitor UOP and serial BMP and adjust therapy as needed. Renally dose meds. Avoid nephrotoxic medications and procedures.    2/10 GT:  Patient is on HD.  3400 mL of fluid removed during dialysis yesterday.  Patient also noted with 100 mL of urine output.  Continue dialysis per routine schedule.    Diabetes mellitus type 2, controlled  Patient's FSGs are controlled on current medication regimen.  Last A1c reviewed-   Lab Results   Component Value Date    HGBA1C 5.0 11/18/2023     Most recent fingerstick glucose reviewed-   Recent Labs   Lab 02/09/24  1233 02/09/24  1642   POCTGLUCOSE 78 74     Current correctional scale  Low  Maintain anti-hyperglycemic dose as follows-   Antihyperglycemics (From admission, onward)      Start     Stop Route Frequency Ordered    02/09/24 1037  insulin aspart U-100 pen 0-5 Units         -- SubQ Before meals & nightly PRN 02/09/24 0938          Hold Oral hypoglycemics while patient is in the hospital.      VTE Risk Mitigation (From  admission, onward)           Ordered     IP VTE HIGH RISK PATIENT  Once         02/08/24 2040     Place sequential compression device  Until discontinued         02/08/24 2040     Place GLENN hose  Until discontinued         02/08/24 2040                    Discharge Planning   MARCY:      Code Status: Full Code   Is the patient medically ready for discharge?:     Reason for patient still in hospital (select all that apply): Patient unstable, Patient trending condition, Treatment, Pending disposition, and Other (specify) transfer  Discharge Plan A: Home with family, Home Health                  JODY ESTEBAN  Department of Hospital Medicine   [facility] S[facility]

## 2024-02-10 NOTE — ASSESSMENT & PLAN NOTE
Patient found to have large pleural effusion on imaging. I have personally reviewed and interpreted the following imaging: Xray. A thoracentesis was deferred. Most likely etiology includes  end-stage renal disease . Management to include Dialysis and general surgery consulted, chest CT ordered.    2/10 GT:  CT of chest reviewed.  Dyspnea most likely s/t extensive left pleural effusion, which extends throughout the entirety of the left hemithorax with a completely collapsed left lung.  Patient also has a large dependent right pleural effusion. Continue with supplemental oxygen.  Anxiolytics as needed.  Transfer process began.  Patient needs to be transferred for higher level of care for pulmonology services.  Patient informed of this, verbalized understanding.

## 2024-02-10 NOTE — DISCHARGE SUMMARY
"[facility]  Discharge Summary      Patient Name: Kevin Sanon  MRN: [id number]  [facility]: [id number]  Patient Class: IP- Inpatient  Admission Date: 2/8/2024  Hospital Length of Stay: 1 days  Discharge Date and Time:  02/10/2024 11:46 AM  Attending Physician: Chencho Frank III, MD   Discharging Provider: JODY ESTEBAN  Primary Care Provider: Chencho Frank III, MD    Primary Care Team: Networked reference to record PCT     HPI:   ED HPI:    Chief Complaint   Patient presents with    Chest Pain       Pt stated that he has been experiencing intermittent chest pain for the past couple weeks. Stents placed approx month ago. Dialysis patient.       Pt presents to the ED for for SOB.  He states that this has been ongoing for them past 3 weeks.  He is on dialysis MWF.  Pt has not had a change in his sxs.  He tells me that he has "fluid" around his lung that has been there for several months.  He does not need to use O2 at home.  Pt has not had a fever.  He denies any cough.  Pt reports nausea w/o vomiting at this time.    IM HPI:  Agree with above.  Patient with increased shortness of breath this morning.  He has received 1 of 2 units PRBC.  We will receive 2nd unit in dialysis today.  Nephrology consulted for continued hemodialysis during admission.  Appreciate recs.  Preliminary chest X ray review findings revealed possible large left pleural effusion.  Waiting for official read.  We will review patient's chart determine if new or worsening.  Patient with worsening shortness a breath.  Speaking in broken sentences.  We obtain CT chest and echocardiogram today.  Cardiology consulted.  Appreciate recs.  General surgery consulted for pleural effusion management.  Continue supplemental O2 via nasal cannula.  Creatinine slightly elevated above baseline.  Plan for dialysis today.  Patient noted to elevated blood pressure.  We will resume home medications post dialysis.  We will repeat CBC " post transfusion.    * No surgery found *      Hospital Course:   2/10 GT: CT of chest demonstrated:  FINDINGS:  Lack of intravenous contrast limits evaluation of the vasculature and remaining mediastinal structures.  Unenhanced aorta is normal in caliber.  Thyroid is unremarkable.  Dense atherosclerotic calcification throughout the arterial vasculature including the coronary arteries.  No pericardial effusion.  No detected thoracic adenopathy.  Large left pleural effusion is present measuring up to 9 cm in greatest axial thickness occupying nearly the entirety of the left hemithorax.  Associated marked mass effect/consolidation of the left lung which appears collapsed centrally.  The parenchyma of the left lung is not able to be evaluated secondary to complete collapsed state.  Subcentimeter calcified pulmonary granuloma within the aerated right lung.  Large dependent right pleural effusion is present measuring up to 6 cm in greatest axial thickness.  Dense underlying right lower lobe moderate atelectasis versus consolidation.  Healing fractures of the right 7th through 12th ribs posteriorly near the costovertebral junction regions.  Previous vertebroplasty at the previous vertebroplasty at T10 and L2.  Multilevel thoracolumbar spinal fusion hardware in place extending from T10-L2.  Diffuse anasarca is present.   Impression:       1. Large left pleural effusion occupying nearly the entirety of the left hemithorax with complete collapse/consolidation of the left lung centrally.  2. Large right pleural effusion layering dependently with moderate underlying atelectasis versus consolidation.  3. Diffuse anasarca.   This morning patient is noted to be tachypneic, with pursed lipped breathing, and is needing to pause multiple times during conversation due to dyspnea.  Patient reports he turned over in the bed to reach something his bedside table, and states worsening dyspnea afterwards.  We have informed patient he needs  to be transferred for pulmonology services and thoracentesis due to has left lung being completely collapsed s/t a left pleural effusion taken of the entirety of the left hemithorax.  Informed patient he also has a large right pleural effusion with atelectasis versus consolidation.  Patient noted to be significantly hypertensive this morning, but he did take his oral antihypertensives.  3400 mL of fluid was removed via dialysis yesterday, the patient had 100 mL of urine output.  H&H improved and is now 10.5/31.9.  Patient did have elevated troponin that was trended, but significantly decreased from 1 month ago, suspect this is from demand ischemia due to hypertensive state as well as ESRD on HD.    DC Summary:  [place] [facility] [place]ology services, thoracentesis. Patient is being transferred for the above mentioned issue. Patient's blood pressure noted to be improved since morning med administration. He remains afebrile. He is stable for transfer once a bed is assigned.      Goals of Care Treatment Preferences:  Code Status: Full Code      Consults:   Consults (From admission, onward)          Status Ordering Provider     Inpatient consult to Cardiology  Once        Provider:  Franklin Plata MD    Completed JED EDMOND     Inpatient consult to Nephrology  Once        Provider:  Deborah Rucker MD    Acknowledged JED EDMOND     Inpatient consult to General Surgery  Once        Provider:  (Not yet assigned)    ALICE Masterson            No new Assessment & Plan notes have been filed under this hospital service since the last note was generated.  Service: Hospital Medicine    Final Active Diagnoses:    Diagnosis Date Noted POA    PRINCIPAL PROBLEM:  Anemia in ESRD (end-stage renal disease) [N18.6, D63.1] 03/02/2020 Yes    Pleural effusion [J90] 11/18/2023 Yes    Dialysis patient [Z99.2]  Not Applicable    COPD (chronic obstructive pulmonary disease) [J44.9]  Yes     End stage kidney disease [N18.6] 11/06/2021 Yes    Diabetes mellitus type 2, controlled [E11.9] 10/16/2014 Yes     Chronic      Problems Resolved During this Admission:    Diagnosis Date Noted Date Resolved POA    Shortness of breath [R06.02] 11/18/2023 02/10/2024 Yes    Anemia of chronic disease [D63.8] 08/18/2022 02/10/2024 Yes       Discharged Condition:  stable, being transferred for higher level of care    Disposition: Another Health Care Inst*    Follow Up:    Patient Instructions:   No discharge procedures on file.    Significant Diagnostic Studies: Labs: CMP   Recent Labs   Lab 02/08/24  1709 02/09/24  1040 02/10/24  0603   * 134* 133*   K 3.4* 3.7 3.7   CL 99 98 96   CO2 32* 31* 31*    93 107   BUN 17 19 16   CREATININE 5.5* 6.3* 5.3*   CALCIUM 7.9* 7.9* 8.2*   PROT 7.8 8.2 7.7   ALBUMIN 2.4* 2.4* 2.2*   BILITOT 0.5 0.7 0.8   ALKPHOS 62 69 69   AST 34 31 34   ALT 20 19 17   ANIONGAP 4 5 6    and CBC   Recent Labs   Lab 02/08/24  1709 02/09/24  1040 02/10/24  0603   WBC 4.21 4.31 5.51   HGB 6.9* 9.0* 10.5*   HCT 22.0* 28.5* 31.9*   * 145* 152       Pending Diagnostic Studies:       Procedure Component Value Units Date/Time    Echo [[id number]]  (Abnormal) Resulted: 02/09/24 1030    Order Status: Sent Lab Status: In process Updated: 02/09/24 1039     BSA 1.97 m2      LVIDd 4.50 cm      LV Systolic Volume 38.11 mL      LV Systolic Volume Index 19.2 mL/m2      LVIDs 3.11 cm      LV Diastolic Volume 92.27 mL      LV Diastolic Volume Index 46.60 mL/m2      IVS 1.61 cm      LVOT diameter 2.12 cm      LVOT area 3.5 cm2      FS 31 %      Left Ventricle Relative Wall Thickness 0.57 cm      Posterior Wall 1.28 cm      LV mass 260.57 g      LV Mass Index 132 g/m2      RVDD 2.49 cm      Ao root annulus 3.72 cm      ZLVIDS -0.96     ZLVIDD -2.39     AORTIC VALVE CUSP SEPERATION 1.85 cm            Medications:  Transfer Medications (for Discharge Readmit only):   Current Facility-Administered  Medications   Medication Dose Route Frequency Provider Last Rate Last Admin    0.9%  NaCl infusion (for blood administration)   Intravenous Q24H PRN Chris Santa MD        acetaminophen tablet 650 mg  650 mg Oral Q8H PRN Chris Santa MD        amLODIPine tablet 10 mg  10 mg Oral Daily Chris Santa MD   10 mg at 02/10/24 0809    dextrose 10% bolus 125 mL 125 mL  12.5 g Intravenous PRN Meena Tripathi P., NP        dextrose 10% bolus 250 mL 250 mL  25 g Intravenous PRN Marquise Tripathiah P., NP        epoetin jennifer injection 10,000 Units  10,000 Units Subcutaneous Every Mon, Wed, Fri Deborah Rucker MD   10,000 Units at 02/09/24 1345    famotidine tablet 20 mg  20 mg Oral Daily Chris Santa MD   20 mg at 02/10/24 0809    glucagon (human recombinant) injection 1 mg  1 mg Intramuscular PRN Marquise Tripathiah P., NP        glucose chewable tablet 16 g  16 g Oral PRN TripathiMarquise pelayoah P., NP        glucose chewable tablet 24 g  24 g Oral PRN TripathiMarquise pelayoah P., NP        hydrALAZINE injection 10 mg  10 mg Intravenous Q6H PRN Magdalena Pena MD   10 mg at 02/10/24 0747    hydrALAZINE tablet 50 mg  50 mg Oral Q12H Chris Santa MD   50 mg at 02/10/24 0809    HYDROcodone-acetaminophen 7.5-325 mg per tablet 1 tablet  1 tablet Oral Q6H PRN Marquise Tripathiah P., NP   1 tablet at 02/10/24 0809    insulin aspart U-100 pen 0-5 Units  0-5 Units Subcutaneous QID (AC + HS) PRN Meena Tripathi P., NP        LORazepam injection 0.5 mg  0.5 mg Intravenous Q6H PRN Magdalena Pena MD   0.5 mg at 02/10/24 0055    melatonin tablet 6 mg  6 mg Oral Nightly PRN Chris Santa MD   6 mg at 02/08/24 2356    mupirocin 2 % ointment   Nasal BID eDborah Rucker MD   Given at 02/10/24 0810    nitroGLYCERIN 2% TD oint ointment 0.5 inch  0.5 inch Topical (Top) Q6H Magdalena Pena MD   0.5 inch at 02/10/24 0638    ondansetron injection 4 mg  4 mg Intravenous Q8H PRN Chris Santa MD   4 mg at 02/09/24 4199     prochlorperazine injection Soln 2.5 mg  2.5 mg Intravenous Q6H PRN Meena Tripathi NP   2.5 mg at 02/09/24 0951    sodium chloride 0.9% flush 10 mL  10 mL Intravenous PRN Chris Santa MD        valsartan tablet 320 mg  320 mg Oral Daily Magdalena Pena MD   320 mg at 02/10/24 0809     Facility-Administered Medications Ordered in Other Encounters   Medication Dose Route Frequency Provider Last Rate Last Admin    0.9%  NaCl infusion   Intravenous Continuous Navdeep Morales MD   New Bag at 02/10/20 0657    lidocaine (PF) 10 mg/ml (1%) injection 10 mg  1 mL Intradermal Once Navdeep Morales MD        ondansetron injection 4 mg  4 mg Intravenous Q12H PRN Navdeep Morales MD           Indwelling Lines/Drains at time of discharge:   Lines/Drains/Airways       Drain  Duration                  Hemodialysis AV Fistula Left forearm -- days                    Time spent on the discharge of patient: 40 minutes         JODY ESTEBAN  Department of Hospital Medicine  [facility]

## 2024-02-10 NOTE — ASSESSMENT & PLAN NOTE
Patient's FSGs are controlled on current medication regimen.  Last A1c reviewed-   Lab Results   Component Value Date    HGBA1C 5.0 11/18/2023     Most recent fingerstick glucose reviewed-   Recent Labs   Lab 02/09/24  1233 02/09/24  1642   POCTGLUCOSE 78 74     Current correctional scale  Low  Maintain anti-hyperglycemic dose as follows-   Antihyperglycemics (From admission, onward)    Start     Stop Route Frequency Ordered    02/09/24 1037  insulin aspart U-100 pen 0-5 Units         -- SubQ Before meals & nightly PRN 02/09/24 0938        Hold Oral hypoglycemics while patient is in the hospital.

## 2024-02-16 PROBLEM — R63.8 INCREASED NUTRITIONAL NEEDS: Status: ACTIVE | Noted: 2024-02-16

## 2024-02-19 PROBLEM — E53.8 B12 DEFICIENCY: Status: ACTIVE | Noted: 2024-02-19

## 2024-02-19 PROBLEM — J93.9 PNEUMOTHORAX ON LEFT: Status: ACTIVE | Noted: 2024-02-19

## 2024-02-19 PROBLEM — J96.01 ACUTE RESPIRATORY FAILURE WITH HYPOXIA: Status: ACTIVE | Noted: 2024-02-19

## 2024-02-19 PROBLEM — Z79.899 POLYPHARMACY: Status: ACTIVE | Noted: 2024-02-19

## 2024-02-19 PROBLEM — D64.9 NORMOCYTIC ANEMIA: Status: ACTIVE | Noted: 2020-03-02

## 2024-02-20 PROBLEM — D72.819 LEUKOPENIA: Status: ACTIVE | Noted: 2024-02-20

## 2024-02-20 PROBLEM — E43 SEVERE PROTEIN-CALORIE MALNUTRITION: Status: ACTIVE | Noted: 2024-02-20

## 2024-02-25 PROBLEM — M54.16 LUMBAR RADICULOPATHY: Status: ACTIVE | Noted: 2024-02-25
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
## 2024-02-25 PROBLEM — J18.9 HOSPITAL ACQUIRED PNA: Status: ACTIVE | Noted: 2024-02-25

## 2024-02-25 PROBLEM — R53.81 PHYSICAL DECONDITIONING: Status: ACTIVE | Noted: 2024-02-25

## 2024-03-16 PROBLEM — Z99.11 ON MECHANICALLY ASSISTED VENTILATION: Status: ACTIVE | Noted: 2024-03-16

## 2024-03-16 PROBLEM — I46.9 CARDIOPULMONARY ARREST: Status: ACTIVE | Noted: 2024-03-16

## 2024-03-16 PROBLEM — R57.9 SHOCK: Status: ACTIVE | Noted: 2024-03-16

## 2024-03-20 PROBLEM — E43 SEVERE PROTEIN-CALORIE MALNUTRITION: Status: ACTIVE | Noted: 2024-03-20

## 2024-03-21 PROBLEM — Z99.2 END STAGE RENAL DISEASE ON DIALYSIS: Status: ACTIVE | Noted: 2024-03-21

## 2024-03-21 PROBLEM — N18.9 CHRONIC KIDNEY DISEASE: Status: ACTIVE | Noted: 2024-03-21

## 2024-03-21 PROBLEM — N18.6 END STAGE RENAL DISEASE ON DIALYSIS: Status: ACTIVE | Noted: 2024-03-21

## 2024-04-02 PROBLEM — I46.9 CARDIOPULMONARY ARREST: Status: RESOLVED | Noted: 2024-03-16 | Resolved: 2024-04-02

## 2024-04-02 PROBLEM — Z99.11 ON MECHANICALLY ASSISTED VENTILATION: Status: RESOLVED | Noted: 2024-03-16 | Resolved: 2024-04-02

## 2024-04-02 PROBLEM — J96.01 ACUTE HYPOXIC RESPIRATORY FAILURE: Status: RESOLVED | Noted: 2024-02-19 | Resolved: 2024-04-02

## 2024-04-02 PROBLEM — R57.9 SHOCK: Status: RESOLVED | Noted: 2024-03-16 | Resolved: 2024-04-02

## 2024-04-14 NOTE — ED PROVIDER NOTES
"EMERGENCY DEPARTMENT HISTORY AND PHYSICAL EXAM     This note is dictated on M*Modal word recognition program.  There are word recognition mistakes and grammatical errors that are occasionally missed on review.     Date: 4/14/2024   Patient Name: Kevin Sanon       History of Presenting Illness      Chief Complaint   Patient presents with    Shortness of Breath     EMS reports, "SOB that started this morning. Nursing home couldn't get an oxygen saturation on his fingers and raised is normal oxygen up from one liter to two liters nasal cannula. He is 97% on two liters. Hypertensive 197/87. GCS 15." Patient is a resident of [facility] in [place]. Nursing home reports diminished breath sounds to right lower lobe and crackles to upper lobes. Dialysis patient (MWF). Nursing home reports AMS/anxiousness. Patient did not receive AM medications.        0920   Kevin Sanon is a 69 y.o. male with PMHX of COPD, ESRD on dialysis, recurrent left pleural effusion, supplemental oxygen dependent who presents to the emergency department C/O shortness of breath.      Patient arrives from [facility] for reported shortness of breath.  They reportedly were unable to obtain a pulse ox at nursing home.  He has been compliant with his dialysis.    In the ER patient states he was short of breath.  Is able to speak short brief sentences.  He is requesting CPAP.  He was on it at the nursing home.  Patient says they had to take him off when they brought him here.  He states his blood pressure is high and it was not normally high like that.  Patient reportedly did not receive any of his a.m. medications today.        PCP: Chencho Frank III, MD        Current Facility-Administered Medications   Medication Dose Route Frequency Provider Last Rate Last Admin    hydrALAZINE tablet 10 mg  10 mg Oral Q6H PRN Dragan Ibrahim MD   10 mg at 04/14/24 0942    HYDROcodone-acetaminophen 5-325 mg per tablet 1 tablet  1 " tablet Oral ED 1 Time Dragan Ibrahim MD        LORazepam tablet 0.5 mg  0.5 mg Oral TID PRN Dragan Ibrahim MD   0.5 mg at 04/14/24 0942    metoprolol tartrate (LOPRESSOR) tablet 25 mg  25 mg Oral BID Dragan Ibrahim MD   25 mg at 04/14/24 0941     Current Outpatient Medications   Medication Sig Dispense Refill    acetaminophen (TYLENOL) 650 MG TbSR Take 650 mg by mouth every 8 (eight) hours.      albuterol-ipratropium (DUO-NEB) 2.5 mg-0.5 mg/3 mL nebulizer solution Take 3 mLs by nebulization every 6 (six) hours as needed for Wheezing. Rescue 75 mL 0    ascorbic acid, vitamin C, (VITAMIN C) 500 MG tablet Take 500 mg by mouth once daily.      atorvastatin (LIPITOR) 40 MG tablet atorvastatin 40 mg tablet, [RxNorm: 356880]      bisacodyL (DULCOLAX) 10 mg Supp Place 10 mg rectally daily as needed.      calcitRIOL (ROCALTROL) 0.25 MCG Cap Take 1 capsule (0.25 mcg total) by mouth once daily. 30 capsule 0    collagenase (SANTYL) ointment Apply topically once daily.      cyanocobalamin (VITAMIN B-12) 1000 MCG tablet Take 1 tablet (1,000 mcg total) by mouth once daily. 30 tablet 0    ferrous gluconate (FERGON) 324 MG tablet Take 324 mg by mouth daily with breakfast.      hydrALAZINE (APRESOLINE) 10 MG tablet Take 1 tablet (10 mg total) by mouth every 6 (six) hours as needed (SBP greater than 180). 120 tablet 0    lactulose (CHRONULAC) 10 gram/15 mL solution Take by mouth once daily.      LORazepam (ATIVAN) 0.5 MG tablet Take 1 tablet (0.5 mg total) by mouth 3 (three) times daily as needed for Anxiety. 30 tablet 0    mag hydrox/aluminum hyd/simeth (MAG-AL PLUS EXTRA STRENGTH ORAL) Take 30 mLs by mouth every 6 (six) hours as needed (for indigestion).      melatonin 3 mg TbDL Take 2 tablets by mouth nightly as needed (for sleep).      metoprolol tartrate (LOPRESSOR) 25 MG tablet Take 1 tablet (25 mg total) by mouth 2 (two) times daily. 60 tablet 11    MULTIVITAMIN ORAL Take by mouth.      ondansetron (ZOFRAN)  4 MG tablet Take 4 mg by mouth every 8 (eight) hours as needed for Nausea.      pantoprazole (PROTONIX) 40 MG tablet Take 1 tablet (40 mg total) by mouth once daily. 30 tablet 11    polyethylene glycol (GLYCOLAX) 17 gram PwPk Take 17 g by mouth once daily. 30 each 0    senna-docusate 8.6-50 mg (PERICOLACE) 8.6-50 mg per tablet Take 1 tablet by mouth daily as needed for Constipation (constipation).      sevelamer carbonate (RENVELA) 800 mg Tab Take 2 tablets (1,600 mg total) by mouth 3 (three) times daily with meals. 180 tablet 11    traZODone (DESYREL) 50 MG tablet Take 50 mg by mouth every evening.      vitamin renal formula, B-complex-vitamin c-folic acid, (RENAL CAPS) 1 mg Cap Take 1 capsule by mouth once daily.      zolpidem (AMBIEN) 10 mg Tab Take 5 mg by mouth nightly as needed.      atorvastatin (LIPITOR) 40 MG tablet Take 1 tablet (40 mg total) by mouth every evening. 90 tablet 3    epoetin jennifer-epbx (RETACRIT) 10,000 unit/mL imjection Inject 1 mL (10,000 Units total) into the skin as needed (in dialysis).      lactulose (CHRONULAC) 20 gram/30 mL Soln Take 45 mLs (30 g total) by mouth 3 (three) times daily as needed (Constipation). 300 mL 0    ondansetron 4 mg/2 mL Soln Inject 4 mg into the vein every 8 (eight) hours as needed (nausea and vomiting).       Facility-Administered Medications Ordered in Other Encounters   Medication Dose Route Frequency Provider Last Rate Last Admin    0.9%  NaCl infusion   Intravenous Continuous Navdeep Morales MD   New Bag at 02/16/24 1521           Past History     Past Medical History:   Past Medical History:   Diagnosis Date    Chronic kidney disease (CKD) 02/10/2020    CKD (chronic kidney disease) stage 3, GFR 30-59 ml/min 10/16/2014    CKD (chronic kidney disease), stage V 01/20/2020    COPD (chronic obstructive pulmonary disease)     Coronary artery disease     COVID-19 01/03/2022    Diabetes mellitus     Dialysis patient     05/03/2023 LAST    Dysphagia 10/2021     Encounter for blood transfusion     Hypertension     Insomnia     PAD (peripheral artery disease)     Recurrent pleural effusion on left     Weight loss 10/2021        Past Surgical History:   Past Surgical History:   Procedure Laterality Date    BACK SURGERY      FLEXIBLE BRONCHOSCOPY N/A 2/16/2024    Procedure: BRONCHOSCOPY, FIBEROPTIC;  Surgeon: Zuhair Wheatley MD;  Location: [facility];  Service: Thoracic;  Laterality: N/A;    KNEE SURGERY Left     TKR    PLACEMENT OF ARTERIOVENOUS GRAFT Left 02/10/2020    Procedure: INSERTION, GRAFT, ARTERIOVENOUS;  Surgeon: Artie Joshi MD;  Location: [facility];  Service: Cardiovascular;  Laterality: Left;    PLEURODESIS WITH VIDEO-ASSISTED THORACOSCOPIC SURGERY (VATS) Left 2/16/2024    Procedure: VATS, WITH PLEURODESIS;  Surgeon: Zuhair Wheatley MD;  Location: [facility] OR;  Service: Thoracic;  Laterality: Left;  2nd case-patient has dialysis in the morning    SPINAL FUSION N/A 11/07/2021    Procedure: FUSION, SPINE;  Surgeon: Vadim Lim MD;  [address];  Service: Orthopedics;  Laterality: N/A;  ORIF T12 Fracture with T10-L2 Fusion, T11 and T12 laminectomy  [email or web address]  SNS: [employer]  [place] + pads  Craniotome and M8    THORACOSCOPIC DECORTICATION OF LUNG Left 2/16/2024    Procedure: VATS, WITH DECORTICATION, LUNG;  Surgeon: Zuhair Wheatley MD;  Location: [facility];  Service: Thoracic;  Laterality: Left;        Family History:   Family History   Problem Relation Name Age of Onset    Diabetes Mother  83    No Known Problems Father      No Known Problems Sister      No Known Problems Brother      No Known Problems Brother      No Known Problems Brother      No Known Problems Brother          Social History:   Social History     Tobacco Use    Smoking status: Former     Current packs/day: 1.00     Average packs/day: 1 pack/day for 20.0 years (20.0 ttl pk-yrs)     Types: Cigars, Cigarettes    Smokeless tobacco: Never    Tobacco comments:      Also reports 5-6 yrs of smoking cigars   Substance Use Topics    Alcohol use: No    Drug use: No        Allergies:   Review of patient's allergies indicates:  No Known Allergies       Review of Systems   Review of Systems   See HPI for pertinent positives and negatives       Physical Exam     Vitals:    04/14/24 1055 04/14/24 1103 04/14/24 1207 04/14/24 1220   BP: (!) 176/79 (!) 164/77 (!) 170/84    Pulse: 61 60 70 72   Resp: 20 19 17 (!) 31   Temp:       SpO2: 100%  96% 100%   Weight:       Height:          Physical Exam  Vitals and nursing note reviewed.   Constitutional:       Appearance: Normal appearance. He is ill-appearing.      Comments: Chronically ill-appearing frail male able to speak short sentences   HENT:      Head: Normocephalic and atraumatic.   Eyes:      Extraocular Movements: Extraocular movements intact.      Conjunctiva/sclera: Conjunctivae normal.   Cardiovascular:      Rate and Rhythm: Normal rate and regular rhythm.   Pulmonary:      Effort: Respiratory distress (breathless when speaking) present.      Breath sounds: Examination of the left-upper field reveals decreased breath sounds. Examination of the left-middle field reveals decreased breath sounds. Examination of the right-lower field reveals decreased breath sounds and rales. Examination of the left-lower field reveals decreased breath sounds. Decreased breath sounds and rales present.   Chest:      Chest wall: No tenderness.   Musculoskeletal:         General: No deformity or signs of injury.      Cervical back: Normal range of motion. No rigidity.      Right lower leg: No tenderness. No edema.      Left lower leg: No tenderness. No edema.   Skin:     General: Skin is dry.      Coloration: Skin is not pale.      Findings: No rash.   Neurological:      General: No focal deficit present.      Mental Status: He is alert.      Cranial Nerves: No cranial nerve deficit.      Coordination: Coordination normal.              Diagnostic  Study Results      Labs -   Recent Results (from the past 12 hour(s))   CBC Auto Differential    Collection Time: 04/14/24  9:57 AM   Result Value Ref Range    WBC 12.21 3.90 - 12.70 K/uL    RBC 3.11 (L) 4.60 - 6.20 M/uL    Hemoglobin 9.4 (L) 14.0 - 18.0 g/dL    Hematocrit 29.5 (L) 40.0 - 54.0 %    MCV 95 82 - 98 fL    MCH 30.2 27.0 - 31.0 pg    MCHC 31.9 (L) 32.0 - 36.0 g/dL    RDW 14.6 (H) 11.5 - 14.5 %    Platelets 154 150 - 450 K/uL    MPV 10.4 9.2 - 12.9 fL    Immature Granulocytes 1.1 (H) 0.0 - 0.5 %    Gran # (ANC) 10.3 (H) 1.8 - 7.7 K/uL    Immature Grans (Abs) 0.14 (H) 0.00 - 0.04 K/uL    Lymph # 0.9 (L) 1.0 - 4.8 K/uL    Mono # 0.8 0.3 - 1.0 K/uL    Eos # 0.1 0.0 - 0.5 K/uL    Baso # 0.03 0.00 - 0.20 K/uL    nRBC 0 0 /100 WBC    Gran % 84.2 (H) 38.0 - 73.0 %    Lymph % 7.1 (L) 18.0 - 48.0 %    Mono % 6.7 4.0 - 15.0 %    Eosinophil % 0.7 0.0 - 8.0 %    Basophil % 0.2 0.0 - 1.9 %    Differential Method Automated    Basic Metabolic Panel    Collection Time: 04/14/24  9:57 AM   Result Value Ref Range    Sodium 129 (L) 136 - 145 mmol/L    Potassium 3.7 3.5 - 5.1 mmol/L    Chloride 95 95 - 110 mmol/L    CO2 29 23 - 29 mmol/L    Glucose 79 70 - 110 mg/dL    BUN 26 (H) 8 - 23 mg/dL    Creatinine 4.2 (H) 0.5 - 1.4 mg/dL    Calcium 9.6 8.7 - 10.5 mg/dL    Anion Gap 5 3 - 11 mmol/L    eGFR 14.7 (A) >60 mL/min/1.73 m^2        Radiologic Studies -    X-Ray Chest 1 View   Final Result      Near complete opacification of left hemithorax, compatible with previously demonstrated combination of pleural collection, pneumonia and or atelectasis      Moderate-large right pleural effusion with associated atelectasis, similar         Electronically signed by: Nathalie Singh MD   Date:    04/14/2024   Time:    10:27           Medications given in the ED-   Medications   LORazepam tablet 0.5 mg (0.5 mg Oral Given 4/14/24 0942)   metoprolol tartrate (LOPRESSOR) tablet 25 mg (25 mg Oral Given 4/14/24 0917)   hydrALAZINE tablet 10  mg (10 mg Oral Given 4/14/24 0942)   HYDROcodone-acetaminophen 5-325 mg per tablet 1 tablet (has no administration in time range)   albuterol-ipratropium 2.5 mg-0.5 mg/3 mL nebulizer solution 3 mL (3 mLs Nebulization Given 4/14/24 0933)           Medical Decision Making    I am the first provider for this patient.     I reviewed the vital signs, available nursing notes, past medical history, past surgical history, family history and social history.     Vital Signs:  Reviewed the patient's vital signs.     Pulse Oximetry Analysis and Interpretation:    100% on NC, normal      CXR  Interpretation: (Per my independent interpretation, pending formal read)   CXR read by Dr. Dragan Ibrahim at 0954    opacification of left lung field , right pleural effusion, overall similar presentation to prior study    External Test Results (Pertinent to encounter):    Records Reviewed: Nursing Notes and Old Medical Records    History Obtained By: Patient    Provider Notes: Kevin Sanon is a 69 y.o. male with shortness of breath, chronic weakness    Co-morbidities Considered:  Chronic illness, debility    Differential Diagnosis:  Respiratory failure, pleural effusion      ED Course:    Reviewed OSH documentation. Patient was recently discharged from [facility] on April 2nd after 17 day stay.  Patient had a cardiac arrest requiring intubation and pressors in the ICU.  He was treated for pneumonia.  There was discussion about possible bilateral PleurX placement for recurrent pleural effusion however patient was not a candidate due to overall weakness.  He subsequently was discharged to LTAC stable on 1 L nasal cannula.  Reviewed palliative care consult note from last admission where patient requested to be switch to DNR and DNI.    9:53 AM  Daughter at bedside. She reports patient appears to be at his baseline.     1:04 PM  Patient monitored in ED for 4 hours now and has been doing well on BiPAP.  Family state he is  basically on BiPAP all day now because he is sleeping most of the time.  Reviewed labs which demonstrate some improvement of his anemia of chronic disease.  Mild hyponatremia. K+ wnl.  Creatinine at baseline.  Patient has been satting adequately with normal work of breathing on BiPAP.  Also suspect there was a significant anxiety component.  Overall patient has multiple co morbidities and chronic illnesses however he was not appear to be acutely decompensated since his recent hospitalization and discharge and therefore would not benefit from hospitalization at this time.  He will have dialysis tomorrow. Discussed all this with patient and his daughters.  Will discharge patient back to [facility].  He is requesting something for pain    ED Course as of 04/14/24 1305   Sun Apr 14, 2024   1013 WBC: 12.21 [MO]   1013 Hemoglobin(!): 9.4 [MO]   1013 Platelet Count: 154 [MO]   1024 Sodium(!): 129 [MO]   1024 Creatinine(!): 4.2 [MO]      ED Course User Index  [MO] Dragan Ibrahim MD       Problems Addressed:  Chronic respiratory failure    Procedures:   Procedures       Diagnosis and Disposition     Critical Care:      DISCHARGE NOTE:       Kevin RAFAEL Sanon's  results have been reviewed with him.  He has been counseled regarding his diagnosis, treatment, and plan.  He verbally conveys understanding and agreement of the signs, symptoms, diagnosis, treatment and prognosis and additionally agrees to follow up as discussed.  He also agrees with the care-plan and conveys that all of his questions have been answered.  I have also provided discharge instructions for him that include: educational information regarding their diagnosis and treatment, and list of reasons why they would want to return to the ED prior to their follow-up appointment, should his condition change. He has been provided with education for proper emergency department utilization.         CLINICAL IMPRESSION:         1. Chronic respiratory  failure, unspecified whether with hypoxia or hypercapnia    2. SOB (shortness of breath)    3. Bilateral pleural effusion    4. Anxiety              PLAN:   1. Discharge Home  2.      Medication List        ASK your doctor about these medications      acetaminophen 650 MG Tbsr  Commonly known as: TYLENOL     albuterol-ipratropium 2.5 mg-0.5 mg/3 mL nebulizer solution  Commonly known as: DUO-NEB  Take 3 mLs by nebulization every 6 (six) hours as needed for Wheezing. Rescue     ascorbic acid (vitamin C) 500 MG tablet  Commonly known as: VITAMIN C     * atorvastatin 40 MG tablet  Commonly known as: LIPITOR     * atorvastatin 40 MG tablet  Commonly known as: LIPITOR  Take 1 tablet (40 mg total) by mouth every evening.     bisacodyL 10 mg Supp  Commonly known as: DULCOLAX     calcitRIOL 0.25 MCG Cap  Commonly known as: ROCALTROL  Take 1 capsule (0.25 mcg total) by mouth once daily.     collagenase ointment  Commonly known as: SANTYL     cyanocobalamin 1000 MCG tablet  Commonly known as: VITAMIN B-12  Take 1 tablet (1,000 mcg total) by mouth once daily.     epoetin jennifer-epbx 10,000 unit/mL imjection  Commonly known as: RETACRIT  Inject 1 mL (10,000 Units total) into the skin as needed (in dialysis).     ferrous gluconate 324 MG tablet  Commonly known as: FERGON     hydrALAZINE 10 MG tablet  Commonly known as: APRESOLINE  Take 1 tablet (10 mg total) by mouth every 6 (six) hours as needed (SBP greater than 180).     * lactulose 10 gram/15 mL solution  Commonly known as: CHRONULAC     * lactulose 20 gram/30 mL Soln  Commonly known as: CHRONULAC  Take 45 mLs (30 g total) by mouth 3 (three) times daily as needed (Constipation).     LORazepam 0.5 MG tablet  Commonly known as: ATIVAN  Take 1 tablet (0.5 mg total) by mouth 3 (three) times daily as needed for Anxiety.     MAG-AL PLUS EXTRA STRENGTH ORAL     melatonin 3 mg Tbdl     metoprolol tartrate 25 MG tablet  Commonly known as: LOPRESSOR  Take 1 tablet (25 mg total) by mouth  2 (two) times daily.     MULTIVITAMIN ORAL     ondansetron 4 MG tablet  Commonly known as: ZOFRAN     ondansetron 4 mg/2 mL Soln     pantoprazole 40 MG tablet  Commonly known as: PROTONIX  Take 1 tablet (40 mg total) by mouth once daily.     polyethylene glycol 17 gram Pwpk  Commonly known as: GLYCOLAX  Take 17 g by mouth once daily.     RENAL CAPS 1 mg Cap  Generic drug: vitamin renal formula (B-complex-vitamin c-folic acid)     senna-docusate 8.6-50 mg 8.6-50 mg per tablet  Commonly known as: PERICOLACE     sevelamer carbonate 800 mg Tab  Commonly known as: RENVELA  Take 2 tablets (1,600 mg total) by mouth 3 (three) times daily with meals.     traZODone 50 MG tablet  Commonly known as: DESYREL     zolpidem 10 mg Tab  Commonly known as: AMBIEN           * This list has 4 medication(s) that are the same as other medications prescribed for you. Read the directions carefully, and ask your doctor or other care provider to review them with you.                 3. [place]  [address] [place] [id number]  [phone]    Schedule an appointment as soon as possible for a visit   Primary care follow up    [facility] Emergency Department  [address] [place] [id number] [phone]  Go to   If symptoms worsen       _______________________________     Please note that this dictation was completed with [employer], the computer voice recognition software.  Quite often unanticipated grammatical, syntax, homophones, and other interpretive errors are inadvertently transcribed by the computer software.  Please disregard these errors.  Please excuse any errors that have escaped final proofreading.             Dragan Ibrahim MD  04/14/24 0392

## 2024-04-14 NOTE — ED NOTES
While repositioning and turning patient, pt appeared to have an episode of bradycardia. Pulse ox decreased to 60's. Patient became unresponsive to verbal stimuli but responsive to sternal rub. MD notified.    Patient appeared to became more alert after Bipap placed on face. Patient had removed bipap prior to nurse entering room. Patient educated on the importance of keeping the bipap on. Continue to monitor patient per MD.

## 2024-04-14 NOTE — ED NOTES
Spoke with [facility] in reference to patient being discharged. NH to set up transport with [facility]. NH notified that patient will be on Bipap for transport.

## (undated) DEVICE — BURR RND FLUT SFT TOUCH 2.0MM

## (undated) DEVICE — DRAPE STERI-DRAPE 1000 17X11IN

## (undated) DEVICE — MARKER SKIN STND TIP BLUE BARR

## (undated) DEVICE — SEE MEDLINE ITEM 157150

## (undated) DEVICE — SYS CLSR DERMABOND PRINEO 22CM

## (undated) DEVICE — DRESSING MEPILEX FLEX 4X4IN

## (undated) DEVICE — KIT BIOGUARD AIR WTR SUC VALVE

## (undated) DEVICE — SUT 2/0 30IN SILK BLK BRAI

## (undated) DEVICE — DRESSING ADH ISLAND 3.6 X 14

## (undated) DEVICE — DRAPE C-ARMOR EQUIPMENT COVER

## (undated) DEVICE — SPONGE DRY VIA GREEN

## (undated) DEVICE — TUBE TORRENT IRRIGATION

## (undated) DEVICE — GOWN SURGICAL BRTHBL XL

## (undated) DEVICE — SYS AQUASHIELD WATTER BOTTLE

## (undated) DEVICE — Device

## (undated) DEVICE — BLADE MILL BONE MEDIUM

## (undated) DEVICE — TAP POWER 6MM DOUBLE LEAD

## (undated) DEVICE — BASIN EMESIS GRAPHITE 500ML

## (undated) DEVICE — ELECTRODE FOAM 535 TEARDROP

## (undated) DEVICE — TUBING OXYGEN CONNECT BUBBLE

## (undated) DEVICE — KIT VIA CUSTOM PROCEDURE

## (undated) DEVICE — TRAY FOLEY 16FR INFECTION CONT

## (undated) DEVICE — DRESSING MEPILEX BORDER 4 X 4

## (undated) DEVICE — NDL SPINAL 18GX3.5 SPINOCAN

## (undated) DEVICE — DRESSING AQUACEL FOAM 4 X 12

## (undated) DEVICE — ADHESIVE MASTISOL VIAL 48/BX

## (undated) DEVICE — SEE MEDLINE ITEM 153215

## (undated) DEVICE — BUR BONE CUT MICRO TPS 3X3.8MM

## (undated) DEVICE — BLADE ELECTRO EDGE INSULATED

## (undated) DEVICE — NDL 22GA X1 1/2 REG BEVEL

## (undated) DEVICE — APPLICATOR CHLORAPREP ORN 26ML

## (undated) DEVICE — ELECTRODE REM PLYHSV RETURN 9

## (undated) DEVICE — NDL 18GA X1 1/2 REG BEVEL

## (undated) DEVICE — SOL IRRI STRL WATER 1000ML

## (undated) DEVICE — KIT EVACUATOR 3-SPRING 1/8 DRN

## (undated) DEVICE — SEE MEDLINE ITEM 146417

## (undated) DEVICE — DIFFUSER

## (undated) DEVICE — DRESSING AQUACEL FOAM 5 X 5

## (undated) DEVICE — CONNECTOR TORRENT SCP OLYMPUS

## (undated) DEVICE — UNDERPAD DISPOSABLE 30X30IN

## (undated) DEVICE — SEE MEDLINE ITEM 156905

## (undated) DEVICE — SUT ETHIBOND 0 CR/CT-1 8-18

## (undated) DEVICE — CLOSURE SKIN 1X5 STERI-STRIP

## (undated) DEVICE — KIT SURGIFLO HEMOSTATIC MATRIX

## (undated) DEVICE — CANNULA EXPEDIUM OPN 16GX160MM

## (undated) DEVICE — CARTRIDGE OIL

## (undated) DEVICE — DRESSING AQUACEL SACRAL 9 X 9

## (undated) DEVICE — SPONGE GAUZE 16PLY 4X4

## (undated) DEVICE — SPONGE LAP 4X18 PREWASHED

## (undated) DEVICE — SUT VICRYL PLUS 2-0 CT1 18

## (undated) DEVICE — DRESSING TRANS 4X4 TEGADERM

## (undated) DEVICE — TUBE SUC UNIVERSAL .25XIN 6FT

## (undated) DEVICE — SEE MEDLINE ITEM 157131

## (undated) DEVICE — DRAPE ABDOMINAL TIBURON 14X11

## (undated) DEVICE — LINER SUCTION CANNISTER REGUGA

## (undated) DEVICE — KIT CONFIDENCE W/O NEEDLES

## (undated) DEVICE — SEE MEDLINE ITEM 157117

## (undated) DEVICE — SYR 30CC LUER LOCK

## (undated) DEVICE — BITE BLOCK ADULT JUMBO ENDO W/

## (undated) DEVICE — ELECTRODE BLD 1 INCH TEFLON